# Patient Record
Sex: FEMALE | Race: BLACK OR AFRICAN AMERICAN
[De-identification: names, ages, dates, MRNs, and addresses within clinical notes are randomized per-mention and may not be internally consistent; named-entity substitution may affect disease eponyms.]

---

## 2019-11-29 ENCOUNTER — HOSPITAL ENCOUNTER (INPATIENT)
Dept: HOSPITAL 92 - ERS | Age: 54
LOS: 3 days | Discharge: HOME | DRG: 291 | End: 2019-12-02
Attending: INTERNAL MEDICINE | Admitting: INTERNAL MEDICINE
Payer: MEDICARE

## 2019-11-29 ENCOUNTER — HOSPITAL ENCOUNTER (EMERGENCY)
Dept: HOSPITAL 9 - MADERS | Age: 54
Discharge: ANOTHER HOSPITAL | End: 2019-11-29
Payer: MEDICARE

## 2019-11-29 VITALS — BODY MASS INDEX: 52 KG/M2

## 2019-11-29 DIAGNOSIS — N17.0: ICD-10-CM

## 2019-11-29 DIAGNOSIS — Z85.72: ICD-10-CM

## 2019-11-29 DIAGNOSIS — E66.01: ICD-10-CM

## 2019-11-29 DIAGNOSIS — D63.1: ICD-10-CM

## 2019-11-29 DIAGNOSIS — C85.90: ICD-10-CM

## 2019-11-29 DIAGNOSIS — I50.9: ICD-10-CM

## 2019-11-29 DIAGNOSIS — Z79.4: ICD-10-CM

## 2019-11-29 DIAGNOSIS — J44.1: Primary | ICD-10-CM

## 2019-11-29 DIAGNOSIS — N25.81: ICD-10-CM

## 2019-11-29 DIAGNOSIS — J45.20: ICD-10-CM

## 2019-11-29 DIAGNOSIS — Z88.0: ICD-10-CM

## 2019-11-29 DIAGNOSIS — I50.33: ICD-10-CM

## 2019-11-29 DIAGNOSIS — E78.5: ICD-10-CM

## 2019-11-29 DIAGNOSIS — Z88.8: ICD-10-CM

## 2019-11-29 DIAGNOSIS — I13.0: Primary | ICD-10-CM

## 2019-11-29 DIAGNOSIS — I13.0: ICD-10-CM

## 2019-11-29 DIAGNOSIS — N18.9: ICD-10-CM

## 2019-11-29 DIAGNOSIS — J44.9: ICD-10-CM

## 2019-11-29 DIAGNOSIS — E87.5: ICD-10-CM

## 2019-11-29 DIAGNOSIS — N18.3: ICD-10-CM

## 2019-11-29 DIAGNOSIS — E83.52: ICD-10-CM

## 2019-11-29 DIAGNOSIS — G47.33: ICD-10-CM

## 2019-11-29 DIAGNOSIS — Z79.899: ICD-10-CM

## 2019-11-29 DIAGNOSIS — E11.22: ICD-10-CM

## 2019-11-29 LAB
ALBUMIN SERPL BCG-MCNC: 3.9 G/DL (ref 3.5–5)
ALP SERPL-CCNC: 156 U/L (ref 40–110)
ALT SERPL W P-5'-P-CCNC: 13 U/L (ref 8–55)
ANION GAP SERPL CALC-SCNC: 15 MMOL/L (ref 10–20)
ANISOCYTOSIS BLD QL SMEAR: (no result) (100X)
AST SERPL-CCNC: 8 U/L (ref 5–34)
BASOPHILS # BLD AUTO: 0.1 THOU/UL (ref 0–0.2)
BASOPHILS NFR BLD AUTO: 1.1 % (ref 0–1)
BILIRUB SERPL-MCNC: 0.5 MG/DL (ref 0.2–1.2)
BUN SERPL-MCNC: 37 MG/DL (ref 9.8–20.1)
CALCIUM SERPL-MCNC: 8.9 MG/DL (ref 7.8–10.44)
CHLORIDE SERPL-SCNC: 106 MMOL/L (ref 98–107)
CK SERPL-CCNC: 99 U/L (ref 29–168)
CO2 SERPL-SCNC: 24 MMOL/L (ref 22–29)
CREAT CL PREDICTED SERPL C-G-VRATE: 0 ML/MIN (ref 70–130)
EOSINOPHIL # BLD AUTO: 0.1 THOU/UL (ref 0–0.7)
EOSINOPHIL NFR BLD AUTO: 1 % (ref 0–10)
GLOBULIN SER CALC-MCNC: 3 G/DL (ref 2.4–3.5)
GLUCOSE SERPL-MCNC: 181 MG/DL (ref 70–105)
HGB BLD-MCNC: 8.7 G/DL (ref 12–16)
LYMPHOCYTES # BLD AUTO: 1.8 THOU/UL (ref 1.2–3.4)
LYMPHOCYTES NFR BLD AUTO: 15.4 % (ref 21–51)
MCH RBC QN AUTO: 22.5 PG (ref 27–31)
MCV RBC AUTO: 80.9 FL (ref 78–98)
MDIFF COMPLETE?: YES
MONOCYTES # BLD AUTO: 0.5 THOU/UL (ref 0.11–0.59)
MONOCYTES NFR BLD AUTO: 4.2 % (ref 0–10)
NEUTROPHILS # BLD AUTO: 9.1 THOU/UL (ref 1.4–6.5)
NEUTROPHILS NFR BLD AUTO: 78.4 % (ref 42–75)
PLATELET # BLD AUTO: 367 THOU/UL (ref 130–400)
POTASSIUM SERPL-SCNC: 5.2 MMOL/L (ref 3.5–5.1)
RBC # BLD AUTO: 3.87 MILL/UL (ref 4.2–5.4)
SODIUM SERPL-SCNC: 140 MMOL/L (ref 136–145)
TROPONIN I SERPL DL<=0.01 NG/ML-MCNC: (no result) NG/ML (ref ?–0.03)
TROPONIN I SERPL DL<=0.01 NG/ML-MCNC: 0.02 NG/ML (ref ?–0.03)
WBC # BLD AUTO: 11.7 THOU/UL (ref 4.8–10.8)

## 2019-11-29 PROCEDURE — 36415 COLL VENOUS BLD VENIPUNCTURE: CPT

## 2019-11-29 PROCEDURE — A9558 XE133 XENON 10MCI: HCPCS

## 2019-11-29 PROCEDURE — 85379 FIBRIN DEGRADATION QUANT: CPT

## 2019-11-29 PROCEDURE — 96375 TX/PRO/DX INJ NEW DRUG ADDON: CPT

## 2019-11-29 PROCEDURE — 82550 ASSAY OF CK (CPK): CPT

## 2019-11-29 PROCEDURE — 82274 ASSAY TEST FOR BLOOD FECAL: CPT

## 2019-11-29 PROCEDURE — 94640 AIRWAY INHALATION TREATMENT: CPT

## 2019-11-29 PROCEDURE — 80053 COMPREHEN METABOLIC PANEL: CPT

## 2019-11-29 PROCEDURE — 96365 THER/PROPH/DIAG IV INF INIT: CPT

## 2019-11-29 PROCEDURE — 87804 INFLUENZA ASSAY W/OPTIC: CPT

## 2019-11-29 PROCEDURE — 71045 X-RAY EXAM CHEST 1 VIEW: CPT

## 2019-11-29 PROCEDURE — 85025 COMPLETE CBC W/AUTO DIFF WBC: CPT

## 2019-11-29 PROCEDURE — 78582 LUNG VENTILAT&PERFUS IMAGING: CPT

## 2019-11-29 PROCEDURE — 96374 THER/PROPH/DIAG INJ IV PUSH: CPT

## 2019-11-29 PROCEDURE — 84484 ASSAY OF TROPONIN QUANT: CPT

## 2019-11-29 PROCEDURE — 82570 ASSAY OF URINE CREATININE: CPT

## 2019-11-29 PROCEDURE — 82805 BLOOD GASES W/O2 SATURATION: CPT

## 2019-11-29 PROCEDURE — 76770 US EXAM ABDO BACK WALL COMP: CPT

## 2019-11-29 PROCEDURE — 93306 TTE W/DOPPLER COMPLETE: CPT

## 2019-11-29 PROCEDURE — 84156 ASSAY OF PROTEIN URINE: CPT

## 2019-11-29 PROCEDURE — 83880 ASSAY OF NATRIURETIC PEPTIDE: CPT

## 2019-11-29 PROCEDURE — 36416 COLLJ CAPILLARY BLOOD SPEC: CPT

## 2019-11-29 PROCEDURE — 81003 URINALYSIS AUTO W/O SCOPE: CPT

## 2019-11-29 PROCEDURE — 83970 ASSAY OF PARATHORMONE: CPT

## 2019-11-29 PROCEDURE — 78472 GATED HEART PLANAR SINGLE: CPT

## 2019-11-29 PROCEDURE — A9604 SM 153 LEXIDRONAM: HCPCS

## 2019-11-29 PROCEDURE — 93005 ELECTROCARDIOGRAM TRACING: CPT

## 2019-11-29 PROCEDURE — 80048 BASIC METABOLIC PNL TOTAL CA: CPT

## 2019-11-29 PROCEDURE — A9540 TC99M MAA: HCPCS

## 2019-11-29 NOTE — RAD
Chest AP view



INDICATION: Cough



COMPARISON: None



FINDINGS:



Lungs:The lungs are clear



Cardiac silhouette: Mild cardiomegaly.



Pulmonary vasculature:Mild pulmonary vascular congestion



Pleural spaces:No pleural effusion or pneumothorax is demonstrated.



Upper abdomen:No abnormality seen.



Osseous structures: No acute osseous abnormality.



Additional findings:Left subclavian chest wall port with the tip of the catheter projecting the regio
n of the SVC.



IMPRESSION: Mild cardiomegaly and pulmonary vascular congestion.



Reported By: Felipe Quintanilla 

Electronically Signed:  11/29/2019 2:58 PM

## 2019-11-30 LAB
ALBUMIN SERPL BCG-MCNC: 3.7 G/DL (ref 3.5–5)
ALP SERPL-CCNC: 145 U/L (ref 40–110)
ALT SERPL W P-5'-P-CCNC: 12 U/L (ref 8–55)
ANALYZER IN CARDIO: (no result)
ANALYZER IN CARDIO: (no result)
ANION GAP SERPL CALC-SCNC: 13 MMOL/L (ref 10–20)
ANION GAP SERPL CALC-SCNC: 14 MMOL/L (ref 10–20)
AST SERPL-CCNC: 7 U/L (ref 5–34)
BASE EXCESS STD BLDA CALC-SCNC: -0.6 MEQ/L
BASE EXCESS STD BLDA CALC-SCNC: -2.4 MEQ/L
BASOPHILS # BLD AUTO: 0 THOU/UL (ref 0–0.2)
BASOPHILS NFR BLD AUTO: 0.1 % (ref 0–1)
BILIRUB SERPL-MCNC: 0.5 MG/DL (ref 0.2–1.2)
BUN SERPL-MCNC: 41 MG/DL (ref 9.8–20.1)
BUN SERPL-MCNC: 42 MG/DL (ref 9.8–20.1)
CA-I BLDA-SCNC: 1.21 MMOL/L (ref 1.12–1.3)
CA-I BLDA-SCNC: 1.21 MMOL/L (ref 1.12–1.3)
CALCIUM SERPL-MCNC: 9 MG/DL (ref 7.8–10.44)
CALCIUM SERPL-MCNC: 9.3 MG/DL (ref 7.8–10.44)
CHLORIDE SERPL-SCNC: 105 MMOL/L (ref 98–107)
CHLORIDE SERPL-SCNC: 105 MMOL/L (ref 98–107)
CO2 SERPL-SCNC: 23 MMOL/L (ref 22–29)
CO2 SERPL-SCNC: 24 MMOL/L (ref 22–29)
CREAT CL PREDICTED SERPL C-G-VRATE: 86 ML/MIN (ref 70–130)
CREAT CL PREDICTED SERPL C-G-VRATE: 89 ML/MIN (ref 70–130)
EOSINOPHIL # BLD AUTO: 0 THOU/UL (ref 0–0.7)
EOSINOPHIL NFR BLD AUTO: 0.1 % (ref 0–10)
GLOBULIN SER CALC-MCNC: 3 G/DL (ref 2.4–3.5)
GLUCOSE SERPL-MCNC: 280 MG/DL (ref 70–105)
GLUCOSE SERPL-MCNC: 94 MG/DL (ref 70–105)
HCO3 BLDA-SCNC: 23.8 MEQ/L (ref 22–28)
HCO3 BLDA-SCNC: 26.1 MEQ/L (ref 22–28)
HCT VFR BLDA CALC: 27 % (ref 36–47)
HCT VFR BLDA CALC: 29 % (ref 36–47)
HGB BLD-MCNC: 8.8 G/DL (ref 12–16)
HGB BLDA-MCNC: 10 G/DL (ref 12–16)
HGB BLDA-MCNC: 9.3 G/DL (ref 12–16)
LYMPHOCYTES # BLD: 0.9 THOU/UL (ref 1.2–3.4)
LYMPHOCYTES NFR BLD AUTO: 6.7 % (ref 21–51)
MCH RBC QN AUTO: 23.8 PG (ref 27–31)
MCV RBC AUTO: 78.8 FL (ref 78–98)
MONOCYTES # BLD AUTO: 0.2 THOU/UL (ref 0.11–0.59)
MONOCYTES NFR BLD AUTO: 1.5 % (ref 0–10)
NEUTROPHILS # BLD AUTO: 12.1 THOU/UL (ref 1.4–6.5)
NEUTROPHILS NFR BLD AUTO: 91.6 % (ref 42–75)
O2 A-A PPRESDIFF RESPIRATORY: 13.9 MM[HG] (ref 0–20)
O2 A-A PPRESDIFF RESPIRATORY: 30.25 MM[HG] (ref 0–20)
PCO2 BLDA: 47.7 MMHG (ref 35–45)
PCO2 BLDA: 53.1 MMHG (ref 35–45)
PH BLDA: 7.31 [PH] (ref 7.35–7.45)
PH BLDA: 7.32 [PH] (ref 7.35–7.45)
PLATELET # BLD AUTO: 343 THOU/UL (ref 130–400)
PO2 BLDA: 53.1 MMHG (ref 80–100)
PO2 BLDA: 76.2 MMHG (ref 80–100)
POTASSIUM BLD-SCNC: 4.83 MMOL/L (ref 3.7–5.3)
POTASSIUM BLD-SCNC: 5.02 MMOL/L (ref 3.7–5.3)
POTASSIUM SERPL-SCNC: 5.3 MMOL/L (ref 3.5–5.1)
POTASSIUM SERPL-SCNC: 5.6 MMOL/L (ref 3.5–5.1)
PROT UR STRIP.AUTO-MCNC: 20 MG/DL
PROT UR-MCNC: 33 MG/DL (ref 1–14)
RBC # BLD AUTO: 3.68 MILL/UL (ref 4.2–5.4)
SODIUM SERPL-SCNC: 135 MMOL/L (ref 136–145)
SODIUM SERPL-SCNC: 138 MMOL/L (ref 136–145)
SPECIMEN DRAWN FROM PATIENT: (no result)
SPECIMEN DRAWN FROM PATIENT: (no result)
TROPONIN I SERPL DL<=0.01 NG/ML-MCNC: (no result) NG/ML (ref ?–0.03)
WBC # BLD AUTO: 13.2 THOU/UL (ref 4.8–10.8)

## 2019-11-30 RX ADMIN — IPRATROPIUM BROMIDE SCH ML: 0.5 SOLUTION RESPIRATORY (INHALATION) at 22:53

## 2019-11-30 RX ADMIN — INSULIN GLARGINE SCH: 100 INJECTION, SOLUTION SUBCUTANEOUS at 21:38

## 2019-11-30 RX ADMIN — INSULIN LISPRO SCH: 100 INJECTION, SOLUTION INTRAVENOUS; SUBCUTANEOUS at 16:34

## 2019-11-30 RX ADMIN — INSULIN LISPRO SCH UNITS: 100 INJECTION, SOLUTION INTRAVENOUS; SUBCUTANEOUS at 08:30

## 2019-11-30 RX ADMIN — INSULIN GLARGINE SCH MLS: 100 INJECTION, SOLUTION SUBCUTANEOUS at 08:30

## 2019-11-30 RX ADMIN — IPRATROPIUM BROMIDE SCH ML: 0.5 SOLUTION RESPIRATORY (INHALATION) at 18:32

## 2019-11-30 RX ADMIN — IPRATROPIUM BROMIDE SCH: 0.5 SOLUTION RESPIRATORY (INHALATION) at 10:35

## 2019-11-30 RX ADMIN — INSULIN LISPRO SCH: 100 INJECTION, SOLUTION INTRAVENOUS; SUBCUTANEOUS at 11:30

## 2019-11-30 RX ADMIN — IPRATROPIUM BROMIDE SCH ML: 0.5 SOLUTION RESPIRATORY (INHALATION) at 14:15

## 2019-11-30 NOTE — CON
DATE OF CONSULTATION:  11/30/2019



CONSULTING PHYSICIAN:  [employer].



REASON FOR CONSULTATION:  Shortness of breath.



HISTORY OF PRESENT ILLNESS:  The patient is a 54-year-old female, who comes into the

hospital with increasing shortness of breath over the last one week.  She tells me

that she has a history of congestive heart failure and she has been accumulating

fluid for the last several days.  She says she has been told she has asthma in the

past and has used inhalers at home.  She has been given some diuretics in this

hospitalization and started to breathe better. 



PAST MEDICAL HISTORY:  

1. Diastolic congestive heart failure.

2. Anemia.

3. Hypertension.

4. Diabetes mellitus.

5. Non-Hodgkin lymphoma.



PAST SURGICAL HISTORY:  Unremarkable.



SOCIAL HISTORY:  Nonsmoker.  Does not consume alcohol.  Lives at home with her

family. 



ALLERGIES:  NONE.



MEDICATIONS:  Prior to admission;

1. Albuterol.

2. Norvasc.

3. Lipitor.

4. Vitamin D2.

5. Furosemide.

6. Gabapentin.

7. Glyburide.

8. Levemir insulin.

9. Humalog insulin.

10. Metformin.

11. Metoprolol-XL.

12. Protonix.

13. Sertraline.

14. Triamterene/hydrochlorothiazide.



REVIEW OF SYSTEMS:  Remarkable for swelling and increased shortness of breath.  No

nausea, vomiting, hematemesis, melena, hematochezia, hematuria, or dysuria. 



PHYSICAL EXAMINATION:

VITAL SIGNS:  Temperature 98.3, pulse 73, respirations 18, O2 saturation 98% on 2 L,

and blood pressure 152/74.  Height 5 feet 9 inches, weight 350 pounds, and BMI is

51. 

GENERAL:  She is awake and alert, and in no distress. 

HEENT:  She has a class 4 Mallampati airway. 

NECK:  Without adenopathy or JVD. 

LUNGS:  Diminished breath sounds in the bases. 

CARDIAC:  S1 and S2.  Regular without audible murmur. 

ABDOMEN:  Soft and nontender. 

EXTREMITIES:  She has 2+ edema from her thighs downward.  Her chest x-ray shows

cardiomegaly poorly penetrated film.  A perfusion pulmonary scan was low probability

for pulmonary emboli. 



LABORATORY DATA:  White blood cell count 13, hematocrit 29, and platelet count 343.

A pH of 7.32, pCO2 of 47, pO2 of 76, that was on room air.  Sodium 138, potassium

5.3, chloride 105, CO2 of 24, BUN 42, creatinine 1.8, and glucose 94.  Parathyroid

hormone level is 236 and calcium level is 9.3. 



ASSESSMENT:  

1. Diastolic congestive heart failure with fluid overload.

2. Renal insufficiency.

3. Secondary hyperparathyroidism.

4. Probable underlying obstructive sleep apnea/cor pulmonale/obesity hypoventilation

syndrome. 



PLAN:  Agree with the diuretics, nebulization therapy, and general cardiac workup.

As an outpatient, she will need a sleep study to further workup for VANE. 







Job ID:  [id number]

## 2019-11-30 NOTE — CON
DATE OF CONSULTATION:  



REASON FOR CONSULTATION:  Hyperkalemia.



HISTORY OF PRESENT ILLNESS:  This is a very pleasant 54-year-old female, living in

[place], presented to the hospital with shortness of breath.  The patient had a

creatinine of 2.0, which decreased to 1.89, prior baseline in October was 1.3.  The

patient denies headache, numbness, tingling, or weakness.  Denies nausea, vomiting,

or chest pain.  The patient was admitted for possible COPD exacerbation and rule out

myocardial infarction. 



PAST MEDICAL HISTORY:  Significant for hypertension, non-Hodgkin lymphoma, and

anemia. 



PAST SURGICAL HISTORY:  None.



SOCIOECONOMIC HISTORY:  No alcohol or drug use.



FAMILY HISTORY:  Negative for ESRD.



ALLERGIES:  REVIEWED.



MEDICATIONS:  Home medications list, reviewed.  Hospital medications list, reviewed.



REVIEW OF SYSTEMS:  A 15-point review of systems was performed and was negative

except for positives noted above. 

GENERAL:   

HEAD:   

NECK:  No swelling or lumps. 

NOSE:  No epistaxis or discharge.   

EYES:  No diplopia or pain. 

RESPIRATORY:   

CARDIOVASCULAR:   

GASTROINTESTINAL:   

/GYN:   

MUSCULOSKELETAL:  No joint pain. 

NEUROPSYCHIATIC SYSTEMS:  No suicidal ideation.  No ideation. 

SKIN:  Denies any rash or ulcer. 

CONSTITUTIONAL:   No fever or chills.



PHYSICAL EXAMINATION:

GENERAL:  The patient is awake and alert. 

VITAL SIGNS:  Afebrile, pulse 73, blood pressure 152/74. 

GENERAL APPEARANCE AND MENTAL STATUS:  Fair. 

HEAD/NECK:  Normocephalic.   Atraumatic. 

EYES:  EOMI.  No deformity. 

EARS:  Clear.  No ulcers. 

NOSE:   Intact.  No lesions. 

MOUTH:  Clear.  No discharge. 

THROAT:  Clear.  No exudate. 

LUNGS:   Clear.  No crackles. 

CARDIAC:   S1, S2.  No rub. 

ABDOMEN:   Benign.  Bowel sounds positive. 

GENITALIA/RECTUM:  Silveira absent. 

BACK/EXTREMITIES:  Edema 0+. 

NEUROLOGICAL:  Alert and motor intact. 

SKIN:   

LYMPHATICS:



LABORATORY DATA:  Reviewed.



ASSESSMENT AND PLAN:  

1. Acute kidney injury with chronic kidney disease, most likely due to progressive

diabetic nephropathy versus acute tubular necrosis.  We will follow renal function

closely and await echo. 

2. Hypertension, stable.

3. Anemia, stable.

4. Hyperkalemia.  We will stop heparin and recheck potassium.

5. Proteinuria.  We will order random urine protein to creatinine ratio and follow

labs closely. 







Job ID:  [id number]

## 2019-11-30 NOTE — PDOC.HOSPP
- Subjective


Encounter Date: 11/30/19


Encounter Time: 11:15


Subjective: 


Patient reports SOB with minimal exertion over the past 2 days. Associated with 

orthopnea and PND. Reports 10 lb weight gain over the past 4-5 days. Reports 

that her abdomen has gotten bigger and dresses are tighter to wear. Reports new 

onset leg swelling over the past week. Denies cough, wheezing, fever, chills, 

chest pain. Has palpitations with activity but no lightheadedness. No urinary 

symptoms.





Never tested for VANE. Reports history of asthma as a child and uses albuterol 

PRN. Never smoked. Diagnosed with CHF 4 yrs ago but never had LHC or stress 

test. Denies CAD, MI, CVA. Has DM for about 15 yrs now.





- Objective


Vital Signs & Weight: 


 Vital Signs (12 hours)











  Temp Pulse Resp BP Pulse Ox


 


 11/30/19 11:00  98.3 F  73  18  152/74 H  98


 


 11/30/19 07:13  97.1 F L  70  20  149/75 H  98


 


 11/30/19 06:35      95


 


 11/30/19 06:34   67  16  


 


 11/30/19 04:05  97.1 F L  73  16  160/76 H  97


 


 11/30/19 00:23   81  18   92 L


 


 11/29/19 23:30  98 F  94  20  188/90 H  96








 Weight











Weight                         350 lb














I&O: 


 











 11/29/19 11/30/19 12/01/19





 06:59 06:59 06:59


 


Intake Total  590 


 


Output Total  1050 


 


Balance  -460 











Result Diagrams: 


 11/30/19 05:13





 11/30/19 05:13


Additional Labs: 


 Accuchecks











  11/30/19 11/29/19





  05:16 22:33


 


POC Glucose  279 H  279 H














Hospitalist ROS





- Medication


Medications: 


Active Medications











Generic Name Dose Route Start Last Admin





  Trade Name Freq  PRN Reason Stop Dose Admin


 


Atorvastatin Calcium  40 mg  11/30/19 09:00  11/30/19 08:32





  Lipitor  PO   40 mg





  DAILY SANDEE   Administration





     





     





     





     


 


Cholecalciferol  1,000 units  11/30/19 09:00  11/30/19 08:31





  Vitamin D3  PO   1,000 units





  DAILY SANDEE   Administration





     





     





     





     


 


Gabapentin  300 mg  11/30/19 09:00  11/30/19 08:32





  Neurontin  PO   300 mg





  TID SANDEE   Administration





     





     





     





     


 


Heparin Sodium (Porcine)  5,000 units  11/30/19 09:00  11/30/19 08:30





  Heparin  SC   5,000 units





  TID SANDEE   Administration





     





     





     





     


 


Insulin Glargine 75 units/  0.75 mls @ 0 mls/hr  11/30/19 09:00  11/30/19 08:30





  Miscellaneous Medication  SC   0.75 mls





  BID SANDEE   Administration





     





     





     





     


 


Insulin Human Lispro  20 units  11/30/19 08:00  11/30/19 08:30





  Humalog  SC   20 units





  TID-WM SANDEE   Administration





     





     





     





     


 


Ipratropium Bromide  2.5 ml  11/30/19 10:30  11/30/19 10:35





  Atrovent  NEB   Not Given





  [id number] [facility]   





     





     





     





     


 


Metoprolol Succinate  100 mg  11/30/19 09:00  11/30/19 08:32





  Toprol Xl  PO   100 mg





  BID SANDEE   Administration





     





     





     





     


 


Pantoprazole Sodium  40 mg  11/30/19 09:00  11/30/19 08:32





  Protonix  PO   40 mg





  DAILY SANDEE   Administration





     





     





     





     


 


Sertraline HCl  200 mg  11/30/19 09:00  11/30/19 08:32





  Zoloft  PO   200 mg





  DAILY SANDEE   Administration





     





     





     





     


 


Sodium Polystyrene Sulfonate  15 gm  11/30/19 08:15  11/30/19 08:31





  Kayexalate Oral Susp 15 Gm/60 Ml  PO  11/30/19 12:00  15 gm





  NOW SANDEE   Administration





     





     





     





     














- Exam


General Appearance: ill appearing (in mild distress)


Eye: PERRL, anicteric sclera


ENT: normocephalic atraumatic, no oropharyngeal lesions, moist mucosa


Neck: supple, symmetric, no thyromegaly, no lymphadenopathy


Heart: RRR, no murmur, no gallops, normal peripheral pulses


Heart - other findings: Bilateral 2+ pitting pedal edema present


Respiratory: normal chest expansion, no tachypnea


Respiratory - other findings: Reduced air entry bilateral bases with mild creps


Gastrointestinal: soft, non-tender, normal bowel sounds, no palpable masses


Gastrointestinal - other findings: obese


Extremities: no cyanosis, no clubbing, 2+ LE edema


Skin: normal turgor, no lesions, no rashes


Neurological: cranial nerve grossly intact, normal sensation to touch, no focal 

deficits


Musculoskeletal: normal tone, normal strength, no muscle wasting


Psychiatric: normal affect, normal behavior, A&O x 3





Hosp A/P


(1) CHF (congestive heart failure)


Code(s): I50.9 - HEART FAILURE, UNSPECIFIED   Status: Acute   


Qualifiers: 


   Heart failure type: unspecified   Heart failure chronicity: acute on chronic

   Qualified Code(s): I50.9 - Heart failure, unspecified   


Plan: 


History of CHF 4 yrs ago but patient not aware which type


Never had ischemia evaluation


Patient now with orthopnea, weight gain, PND and pedal edema


Switch to inpatient status. Expected to stay at least 2 midnights


High risk due to need for IV diuretics and GABBY on CKD


Cardio consult


ECHO done this AM


IV lasix and fluid restriction


Malagon cath for strict I/Os








(2) Hyperkalemia


Code(s): E87.5 - HYPERKALEMIA   Status: Acute   


Plan: 


PO kayaxelate given x 1








(3) GABBY (acute kidney injury)


Code(s): N17.9 - ACUTE KIDNEY FAILURE, UNSPECIFIED   Status: Acute   


Plan: 


Baseline creatinine of 1.3


Now with worsened function


Related to CHF exacerbation


Nephrology consulted


Avoid nephrotoxic meds and hypotension


Renal US ordered








(4) DM type 2 (diabetes mellitus, type 2)


Status: Chronic   


Qualifiers: 


   Diabetes mellitus long term insulin use: with long term use   Diabetes 

mellitus complication status: with kidney complications   Diabetes mellitus 

complication detail: with chronic kidney disease   Chronic kidney disease stage

: stage 3 (moderate)   Qualified Code(s): E11.22 - Type 2 diabetes mellitus 

with diabetic chronic kidney disease; N18.3 - Chronic kidney disease, stage 3 (

moderate); Z79.4 - Long term (current) use of insulin   


Plan: 


On home dose of basal insulin


HA1C of 9.5 in 10/19


SSI and diabetic diet


Complicated by CKD-3 and GABBY


Never saw nephrology


Dr. Gordon consulted


PTH elevated








(5) Hyperparathyroidism


Code(s): E21.3 - HYPERPARATHYROIDISM, UNSPECIFIED   Status: Chronic   


Plan: 


Likely related to CKD and Vit. D deficiency


Continue Vit. D supplementation


Nephrology consulted








(6) Vitamin D deficiency


Code(s): E55.9 - VITAMIN D DEFICIENCY, UNSPECIFIED   Status: Chronic   


Plan: 


Replacement








(7) VANE (obstructive sleep apnea)


Code(s): G47.33 - OBSTRUCTIVE SLEEP APNEA (ADULT) (PEDIATRIC)   Status: 

Suspected   


Plan: 


Never had a PSG done


Pulm. consulted and case DW Dr. Barajas


Also suspicion for OHS. ABG ordered to evaluate for hypercapnia








(8) Asthma


Code(s): J45.909 - UNSPECIFIED ASTHMA, UNCOMPLICATED   Status: Chronic   


Qualifiers: 


   Asthma severity: mild   Asthma persistence: intermittent   Asthma 

complication type: uncomplicated   Qualified Code(s): J45.20 - Mild 

intermittent asthma, uncomplicated   


Plan: 


On albuterol PRN


Duonebs PRN for now


Not in exacerbation


Pulm. consulted








(9) HTN (hypertension)


Code(s): I10 - ESSENTIAL (PRIMARY) HYPERTENSION   Status: Chronic   


Qualifiers: 


   Hypertension type: essential hypertension   Qualified Code(s): I10 - 

Essential (primary) hypertension   


Plan: 


Hold anti-HTN due to being on IV diuretics and her GABBY








(10) Morbid obesity with BMI of 50.0-59.9, adult


Code(s): E66.01 - MORBID (SEVERE) OBESITY DUE TO EXCESS CALORIES; Z68.43 - BODY 

MASS INDEX (BMI) 50.0-59.9, ADULT   Status: Chronic   





- Plan


plan discussed w/ family, malagon catheter, DVT proph w/heparin

## 2019-11-30 NOTE — HP
CHIEF COMPLAINT:  Shortness of breath.



PRIMARY CARE PHYSICIAN:  Herminia Lowry MD



HISTORY OF PRESENT ILLNESS:  Ms. Hart is a very pleasant 54-year-old female who

reported to the emergency room in [place] today with 2 days to 3 days of

progressive cough and wheezing with worsening intensity and developing some

respiratory distress this afternoon prior to arrival.  She describes symptoms as

tightness, wheezing.  She reports that she does have a history of COPD, but when

asked she denies a history of CHF or been told that she had a large heart.  She does

have a past medical history pertinent for non-Hodgkin lymphoma, has been in

remission for the last 4 years, anemia, hypertension. 



In the emergency room, labs white blood cell count 11.7, hemoglobin 8.7.  The

patient was found to be wheezing, some moderate respiratory distress.  Chest x-ray

there showed some mild pulmonary vascular congestion, was given 2 DuoNebs,

Solu-Medrol 125, Lasix 40, and was sent over to [facility]

for admission.  The patient also has chronic kidney disease.  In October 2019,

creatinine was 1.34 today, it is 2.06.  The patient was given magnesium 2 g IV

piggyback here and then admitted to observation for further management. 



REVIEW OF SYSTEMS:  The patient reports shortness of breath, dyspnea on exertion,

some orthopnea and wheezing.  Denies any fever or chills.  Denies any abdominal

pain.  Denies nausea, vomiting, diarrhea.  Denies any dizziness.  All systems were

reviewed and are negative unless mentioned above or in HPI. 



PAST MEDICAL HISTORY:  Anemia, hypertension, diabetes, is in remission for

non-Hodgkin's lymphoma. 



PAST SURGICAL HISTORY:  None.



PSYCHIATRIC HISTORY:  None.



SOCIAL HISTORY:  Denies any alcohol or drug use.  Denies any smoking history.  She

lives at home with her family. 



KNOWN ALLERGIES:  None.



CURRENT MEDICATIONS:  

1. Albuterol 2 puffs q.4 hours as needed.

2. Norvasc 10 mg p.o. daily.

3. Lipitor 40 mg p.o. daily.

4. Vitamin D2 53980 units p.o. q.7 days.

5. Furosemide 20 mg p.o. b.i.d.

6. Gabapentin 300 mg p.o. t.i.d.

7. Glyburide 5 mg p.o. b.i.d.

8. Levemir 75 units subcu b.i.d.

9. Humalog 20 units subcu t.i.d.

10. Metformin 1000 mg p.o. daily.

11. Toprol- mg p.o. b.i.d. 

12. Protonix 40 mg p.o. daily.

13. Potassium chloride 10 mEq p.o. daily.  

14. Sertraline 2 tablets p.o. daily.  

15. Triamterene/hydrochlorothiazide 37.5/25 mg p.o. daily.



PHYSICAL EXAMINATION:

VITAL SIGNS:  Blood pressure 175/87, pulse 75, respiratory rate is 24, temp is 98.3,

pO2 sats are 98% on 2 L. 

CONSTITUTIONAL:  The patient appears in mild distress.  She appears ill.  She is

alert and oriented to person, place, and time. 

HEENT:  Head is atraumatic and normocephalic.  Eyes, pupils are equally round and

reactive to light.  Extraocular muscles are intact.  ENT; mouth exam is vikram.

Mucous membranes are moist. 

NECK:  Normal range of motion.  Trachea is midline. 

RESPIRATORY:  Chest breath sounds with occasional rhonchi.  No wheezing.  Chest

expansion is equal.  Breath sounds bilaterally decreased in the bases. 

CARDIOVASCULAR:  __________. 

ABDOMEN:  Nontender.  Bowel sounds are heard.  Exam is limited by body habitus.  No

CVA tenderness. 

BACK:  Normal range of motion.   

EXTREMITIES:  Upper extremity normal range of motion.  Inspection is normal.  Radial

pulses normal.  Lower extremity, inspection has normal range of motion.  Pedal

pulses are normal.  There is +2 edema. 

NEUROLOGIC:  The patient is alert and oriented to person, place, and time.  Speech

is normal. 

SKIN:  Warm, dry.  Normal color. 

PSYCHIATRIC:  Normal affect.  She is alert and oriented to person, place, and time.



LABORATORY DATA:  EKG in the emergency room shows conduction normal, ST segments,

T-waves are normal, axis is normal. 



PLAN/ASSESSMENT:  Dyspnea with a history of chronic obstructive pulmonary disease.

The patient was given 3 DuoNebs.  We will continue these q.6 hours scheduled.  We

will hold off on the steroids for now.  Patient x-ray shows mild cardiomegaly with

some pulmonary congestion.  The patient denies a history of congestive heart

failure.  We will obtain an echocardiogram.  The patient's D-dimer was also

elevated, acute-on-chronic renal failure with a creatinine over 2, get a V/Q scan,

x2 troponins undetectable. 

1. Acute-on-chronic kidney, hold any nephrotoxic drugs.  Recheck in the morning.

2. Hypercalcemia at 5.2.  We will recheck this in the morning.

3. Diabetes type 2.  Accu-Cheks before meals and at bedtime, sliding-scale as

needed.  We will restart her basal insulin. 

4. Anemia.  The patient has had a drop in her hemoglobin.  Check guaiac.  Recheck in

the a.m. 

5. History of hypertension.  We will restart home medications.  We will trend.

6. Hyperlipidemia.  We will restart home medications. 

Hospital course dependent on clinical findings.  Case discussed with Dr. Allen who

agrees with plan. 







Job ID:  [id number] 14-Nov-2017 22:30

## 2019-11-30 NOTE — ULT
RENAL ULTRASOUND:

 

HISTORY: 

Acute kidney insufficiency.

 

FINDINGS: 

Both kidneys measure approximately 11 cm in length.  No evidence of hydronephrosis.  No renal mass le
denise.  Cortical thickness and echogenicity appears preserved.

 

Images of the bladder region show no evidence of distended bladder.  The bladder appears to be contra
cted and is not evaluated.

 

IMPRESSION: 

Unremarkable renal ultrasound.

 

POS: OFF

## 2019-11-30 NOTE — CON
DATE OF CONSULTATION:  11/30/2019



REASON FOR CONSULTATION:  Shortness of breath.



HISTORY OF PRESENT ILLNESS:  Ms. Hart is a pleasant 54-year-old 

female, who comes to the hospital for worsening shortness of breath.  She states for

the last week, she has been noticing accumulation of fluid in her legs and getting a

lot more shortness of breath to the point where she had to come in for evaluation.

She has had this happened in the past.  She has been told at some point she had

asthma.  She has been given some IV diuretics and has already urinated a whole lot

of fluid and is feeling much better already. 



PAST MEDICAL HISTORY:  

1. History of diastolic heart failure in the past.

2. Anemia.

3. Hypertension.

4. Type 2 diabetes.

5. Non-Hodgkin's lymphoma in the past.



PAST SURGICAL HISTORY:  None.



SOCIAL HISTORY:  No alcohol, tobacco, or drugs.



OUTPATIENT MEDICATIONS:  Include;

1. Albuterol.

2. Metformin 1000 mg a day.

3. Insulin lispro 20 units t.i.d.

4. Levemir 75 units b.i.d.

5. Sertraline.

6. Triamterene-hydrochlorothiazide.

7. Potassium chloride 10 mEq a day.

8. Atorvastatin 40 mg a day.

9. Furosemide 20 mg b.i.d.

10. Amlodipine 10 mg a day.

11. Pantoprazole 40 mg a day.

12. Metoprolol succinate 100 mg b.i.d.

13. Vitamin D.

14. Gabapentin.

15. Glyburide 5 mg b.i.d.



ALLERGIES:  PENICILLIN GIVES HER HIVES.



REVIEW OF SYSTEMS:  A 12-point review of system is unremarkable except stated in

history of present illness. 



FAMILY HISTORY:  Noncontributory.



PHYSICAL EXAMINATION:

VITAL SIGNS:  Temperature 98.3, pulse 72, respiratory rate 18, sat 98% on 2 L, blood

pressure 138/65. 

GENERAL:  Awake, alert, and oriented x3.  No distress. 

HEENT:  Normocephalic and atraumatic. 

NECK:  Supple. 

LUNGS:  Clear. 

CARDIOVASCULAR:  S1 and S2.  No S3 or S4.  Distant heart sounds. 

ABDOMEN:  Soft.  Positive bowel sounds. 

EXTREMITIES:  2+ edema. 

SKIN:  Warm and dry.



LABORATORY DATA:  Laboratory work was reviewed.  White count of 13, hemoglobin 8.8,

hematocrit of 29, platelet count of 243.  ABG was reviewed chemistries were

reviewed.  Sodium 138, potassium is 5.3, BUN of 42, creatinine 1.83, this is down

from 1.89 after some diuresis.  PTH was elevated.  Alkaline phosphatase was

elevated.  Troponin I is undetectable.  UA unremarkable. 



EKG was reviewed. 



Chest x-ray was reviewed. 



Echocardiogram showed what appeared to be normal EF, however, this is difficult to

assess.  There is a technically difficult study.  Right ventricular pressures are

elevated estimated about 41 mmHg that is assuming 5 mmHg from the IVC which was not

well seen. 



ASSESSMENT:  

1. Acute on chronic diastolic heart failure.

2. Type 2 diabetes.

3. Obesity.

4. Concern for obesity hypoventilation syndrome and severe sleep apnea.



PLAN:  

1. Agree with continued IV diuresis.  She is already feeling much better.

2. I will recommend a MUGA scan to get a better evaluation of her LV function.  Her

inferior wall and inferolateral wall, I cannot really see on the echo and I cannot

tell if they are hypokinetic or normokinetic. 



Thank you for letting us to participate in the care of your patient.  We will follow.







Job ID:  [id number]

## 2019-11-30 NOTE — NM
VENTILATION PERFUSION SCAN:

 

INDICATION: 

Shortness of breath.  Tachypnea.  Elevated D-dimer.

 

COMPARISON: 

Correlation is made to a portable chest film of 11/29/2019.  That exam showed mild vascular congestio
n.  

 

FINDINGS: 

Ventilation scan performed administering 13 mCi of inhaled Xenon gas.

 

Images show symmetric ventilation.  No ventilation defect or significant air trapping.

 

Perfusion scan performed administering 6.5 mCi of Technetium labelled MAA IV.  Lungs were imaged in 8
 projections.

 

No perfusion defect.  

 

IMPRESSION: 

Low probability of pulmonary embolus.

 

POS: OFF

## 2019-12-01 LAB
ALBUMIN SERPL BCG-MCNC: 3.8 G/DL (ref 3.5–5)
ALP SERPL-CCNC: 139 U/L (ref 40–110)
ALT SERPL W P-5'-P-CCNC: 16 U/L (ref 8–55)
ANION GAP SERPL CALC-SCNC: 12 MMOL/L (ref 10–20)
AST SERPL-CCNC: 9 U/L (ref 5–34)
BASOPHILS # BLD AUTO: 0.1 THOU/UL (ref 0–0.2)
BASOPHILS NFR BLD AUTO: 0.5 % (ref 0–1)
BILIRUB SERPL-MCNC: 0.6 MG/DL (ref 0.2–1.2)
BUN SERPL-MCNC: 45 MG/DL (ref 9.8–20.1)
CALCIUM SERPL-MCNC: 9.5 MG/DL (ref 7.8–10.44)
CHLORIDE SERPL-SCNC: 103 MMOL/L (ref 98–107)
CO2 SERPL-SCNC: 29 MMOL/L (ref 22–29)
CREAT CL PREDICTED SERPL C-G-VRATE: 92 ML/MIN (ref 70–130)
EOSINOPHIL # BLD AUTO: 0.1 THOU/UL (ref 0–0.7)
EOSINOPHIL NFR BLD AUTO: 0.7 % (ref 0–10)
GLOBULIN SER CALC-MCNC: 3 G/DL (ref 2.4–3.5)
GLUCOSE SERPL-MCNC: 64 MG/DL (ref 70–105)
HGB BLD-MCNC: 9.1 G/DL (ref 12–16)
LYMPHOCYTES # BLD: 2.6 THOU/UL (ref 1.2–3.4)
LYMPHOCYTES NFR BLD AUTO: 20.1 % (ref 21–51)
MCH RBC QN AUTO: 23.8 PG (ref 27–31)
MCV RBC AUTO: 78.3 FL (ref 78–98)
MONOCYTES # BLD AUTO: 0.5 THOU/UL (ref 0.11–0.59)
MONOCYTES NFR BLD AUTO: 3.8 % (ref 0–10)
NEUTROPHILS # BLD AUTO: 9.6 THOU/UL (ref 1.4–6.5)
NEUTROPHILS NFR BLD AUTO: 74.9 % (ref 42–75)
PLATELET # BLD AUTO: 404 THOU/UL (ref 130–400)
POTASSIUM SERPL-SCNC: 4.3 MMOL/L (ref 3.5–5.1)
RBC # BLD AUTO: 3.8 MILL/UL (ref 4.2–5.4)
SODIUM SERPL-SCNC: 140 MMOL/L (ref 136–145)
WBC # BLD AUTO: 12.8 THOU/UL (ref 4.8–10.8)

## 2019-12-01 RX ADMIN — INSULIN GLARGINE SCH MLS: 100 INJECTION, SOLUTION SUBCUTANEOUS at 20:54

## 2019-12-01 RX ADMIN — INSULIN LISPRO SCH: 100 INJECTION, SOLUTION INTRAVENOUS; SUBCUTANEOUS at 17:22

## 2019-12-01 RX ADMIN — IPRATROPIUM BROMIDE SCH ML: 0.5 SOLUTION RESPIRATORY (INHALATION) at 22:43

## 2019-12-01 RX ADMIN — INSULIN LISPRO SCH: 100 INJECTION, SOLUTION INTRAVENOUS; SUBCUTANEOUS at 09:04

## 2019-12-01 RX ADMIN — IPRATROPIUM BROMIDE SCH ML: 0.5 SOLUTION RESPIRATORY (INHALATION) at 02:29

## 2019-12-01 RX ADMIN — IPRATROPIUM BROMIDE SCH ML: 0.5 SOLUTION RESPIRATORY (INHALATION) at 18:26

## 2019-12-01 RX ADMIN — INSULIN LISPRO SCH: 100 INJECTION, SOLUTION INTRAVENOUS; SUBCUTANEOUS at 12:09

## 2019-12-01 RX ADMIN — IPRATROPIUM BROMIDE SCH ML: 0.5 SOLUTION RESPIRATORY (INHALATION) at 13:57

## 2019-12-01 RX ADMIN — IPRATROPIUM BROMIDE SCH ML: 0.5 SOLUTION RESPIRATORY (INHALATION) at 06:57

## 2019-12-01 RX ADMIN — INSULIN GLARGINE SCH: 100 INJECTION, SOLUTION SUBCUTANEOUS at 09:07

## 2019-12-01 RX ADMIN — IPRATROPIUM BROMIDE SCH ML: 0.5 SOLUTION RESPIRATORY (INHALATION) at 10:19

## 2019-12-01 NOTE — PDOC.HOSPP
- Subjective


Encounter Date: 12/01/19


Encounter Time: 13:00


Subjective: 





f/u for Acute/Chronic diast CHF on IV Lasix. Overall feels better and less SOB. 

Ambulated in halls today. 





- Objective


Vital Signs & Weight: 


 Vital Signs (12 hours)











  Temp Pulse Resp BP Pulse Ox


 


 12/01/19 12:00  96.6 F L  66  28 H  170/83 H  92 L


 


 12/01/19 10:19   68  16   93 L


 


 12/01/19 07:33  97.5 F L  66  18  133/64  95


 


 12/01/19 06:57   77  18   96


 


 12/01/19 04:00  97.9 F  64  24 H  145/66 H  93 L


 


 12/01/19 02:29   53 L  16   94 L








 Weight











Weight                         352 lb 4.8 oz














I&O: 


 











 11/30/19 12/01/19 12/02/19





 06:59 06:59 06:59


 


Intake Total 590 840 


 


Output Total 1050 1700 


 


Balance -460 -860 











Result Diagrams: 


 12/01/19 03:51





 12/01/19 03:51


Additional Labs: 


 Accuchecks











  12/01/19 12/01/19 11/30/19





  10:24 05:38 20:07


 


POC Glucose  120 H  97  128 H








Microbiology





11/30/19 11:40   Stool   Stool Occult Blood (TAWANDA) - Final





 Laboratory Tests











  11/30/19 11/30/19 12/01/19





  05:13 13:25 03:51


 


WBC  13.2 H  


 


Hgb  8.8 L  


 


Neutrophils %  91.6 H   74.9


 


Potassium   5.3 H 


 


BUN   42 H 


 


Creatinine   1.83 H 











Radiology Reviewed by me: Yes (Echo - Grade I/III diast dsfxn, LV not fully 

assessed)


EKG Reviewed by me: Yes (Tele - SR)





Hospitalist ROS





- Medication


Medications: 


Active Medications











Generic Name Dose Route Start Last Admin





  Trade Name Freq  PRN Reason Stop Dose Admin


 


Acetaminophen  650 mg  11/29/19 21:33  11/30/19 17:04





  Tylenol  PO   650 mg





  Q4H PRN   Administration





  Headache/Fever/Mild Pain (1-3)   





     





     





     


 


Atorvastatin Calcium  40 mg  11/30/19 09:00  12/01/19 09:14





  Lipitor  PO   40 mg





  DAILY SANDEE   Administration





     





     





     





     


 


Cholecalciferol  1,000 units  11/30/19 09:00  12/01/19 09:14





  Vitamin D3  PO   1,000 units





  DAILY SANDEE   Administration





     





     





     





     


 


Furosemide  60 mg  11/30/19 14:00  12/01/19 05:58





  Lasix  SLOW IVP   60 mg





  0600,1400 SANDEE   Administration





     





     





     





     


 


Gabapentin  300 mg  11/30/19 09:00  12/01/19 09:15





  Neurontin  PO   300 mg





  TID SANDEE   Administration





     





     





     





     


 


Insulin Glargine 75 units/  0.75 mls @ 0 mls/hr  11/30/19 09:00  12/01/19 09:07





  Miscellaneous Medication  SC   Not Given





  BID SANDEE   





     





     





     





     


 


Insulin Human Lispro  20 units  11/30/19 08:00  12/01/19 12:09





  Humalog  SC   Not Given





  TID-WM SANDEE   





     





     





     





     


 


Ipratropium Bromide  2.5 ml  11/30/19 10:30  12/01/19 10:19





  Atrovent  NEB   2.5 ml





  [id number] SANDEE   Administration





     





     





     





     


 


Metoprolol Succinate  100 mg  11/30/19 09:00  12/01/19 09:15





  Toprol Xl  PO   100 mg





  BID SANDEE   Administration





     





     





     





     


 


Pantoprazole Sodium  40 mg  11/30/19 09:00  12/01/19 09:15





  Protonix  PO   40 mg





  DAILY SANDEE   Administration





     





     





     





     


 


Sertraline HCl  200 mg  11/30/19 09:00  12/01/19 09:15





  Zoloft  PO   200 mg





  DAILY SANDEE   Administration





     





     





     





     














- Exam


General Appearance: NAD, awake alert


Eye: PERRL, anicteric sclera


ENT: normocephalic atraumatic, no oropharyngeal lesions


Neck: supple, symmetric, no JVD, no thyromegaly, no lymphadenopathy


Heart: RRR, no gallops, no rubs, normal peripheral pulses


Respiratory: CTAB


Respiratory - other findings: diminished in bases bilat, scattered coarse sounds


Gastrointestinal: soft, non-tender, non-distended, normal bowel sounds


Extremities: no cyanosis, no clubbing, 2+ LE edema


Skin: normal turgor, no lesions


Neurological: cranial nerve grossly intact, no new deficit


Musculoskeletal: normal tone, generalized weakness


Psychiatric: normal affect, A&O x 3





Hosp A/P


(1) Acute on chronic diastolic (congestive) heart failure


Code(s): I50.33 - ACUTE ON CHRONIC DIASTOLIC (CONGESTIVE) HEART FAILURE   Status

: Acute   


Plan: 


Continue Lasix 60mg IV BID, serial I/O's, accurate daily weights, MUGA scan 

pending








(2) GABBY (acute kidney injury)


Code(s): N17.9 - ACUTE KIDNEY FAILURE, UNSPECIFIED   Status: Acute   


Plan: 


Improved, avoid nephrotoxic meds and limit contrast exposure, serial creatinine








(3) Hyperkalemia


Code(s): E87.5 - HYPERKALEMIA   Status: Acute   


Plan: 


Resolved, continue serial monitoring








(4) DM type 2 (diabetes mellitus, type 2)


Status: Chronic   


Qualifiers: 


   Diabetes mellitus long term insulin use: with long term use   Diabetes 

mellitus complication status: with kidney complications   Diabetes mellitus 

complication detail: with chronic kidney disease   Chronic kidney disease stage

: stage 3 (moderate)   Qualified Code(s): E11.22 - Type 2 diabetes mellitus 

with diabetic chronic kidney disease; N18.3 - Chronic kidney disease, stage 3 (

moderate); Z79.4 - Long term (current) use of insulin   


Plan: 


Hold Glargine due to relative hypoglycemia, serial accuchecks, ISS








(5) HTN (hypertension)


Code(s): I10 - ESSENTIAL (PRIMARY) HYPERTENSION   Status: Chronic   


Qualifiers: 


   Hypertension type: essential hypertension   Qualified Code(s): I10 - 

Essential (primary) hypertension   


Plan: 


Stable, continue current BP regimen, serial monitoring








(6) Morbid obesity with BMI of 50.0-59.9, adult


Code(s): E66.01 - MORBID (SEVERE) OBESITY DUE TO EXCESS CALORIES; Z68.43 - BODY 

MASS INDEX (BMI) 50.0-59.9, ADULT   Status: Chronic   





(7) VANE (obstructive sleep apnea)


Code(s): G47.33 - OBSTRUCTIVE SLEEP APNEA (ADULT) (PEDIATRIC)   Status: Chronic

   


Plan: 


Outpt sleep study








- Plan


plan discussed w/ family, PT/OT, , out of bed/ambulate





Stable currently


Continue Lasix 60mg IV BID


OOB/ambulate


MUGA scan pending


AM lab: BMP


Likely home in 24h

## 2019-12-01 NOTE — PRG
DATE OF SERVICE:  12/01/2019



SUBJECTIVE:  This is a 54-year-old female, being seen for acute kidney injury.  The

patient denies any nausea, vomiting, or chest pain. 



OBJECTIVE:  GENERAL:  The patient is awake and alert. 

VITAL SIGNS:  Afebrile, pulse 75, breathing at 16, blood pressure was 133/64. 

GENERAL APPEARANCE AND MENTAL STATUS:  Fair. 

HEAD/NECK:  Normocephalic.   Atraumatic. 

EYES:  EOMI.  No deformity. 

EARS:  Clear.  No ulcers. 

NOSE:  Intact.  No lesions. 

MOUTH:  Clear.  No discharge. 

THROAT:  Clear.  No exudate. 

LUNGS:  Clear.  No crackles. 

CARDIAC:  S1, S2.  No rub. 

ABDOMEN:  Benign.  Bowel sounds positive. 

GENITALIA/RECTUM:  Silveira absent. 

BACK/EXTREMITIES:  Edema 0+. 

NEUROLOGICAL:  Alert and motor intact. 

SKIN:   

LYMPHATICS:



LABORATORY DATA:  Reviewed.



ASSESSMENT AND PLAN:  

1. Acute kidney injury with chronic kidney disease, stage 3, stable.

2. Acute tubular necrosis, improved.

3. Hypertension, stable.

4. Anemia, stable. 



Medication based on GFR, appropriate.  No indication for dialysis.  Renal ultrasound

shows no hydronephrosis or renal mass.  Proteinuria 1.5 g.  We will consider ACE

inhibitor once renal function improves. 





Job ID:  [id number]

## 2019-12-01 NOTE — PRG
DATE OF SERVICE:  12/01/2019



SUBJECTIVE:  Ms. Hart is sitting up in bed.  She feels better today.  She is not

having difficulty breathing at this time. 



OBJECTIVE:  VITAL SIGNS:  Her temperature is 96.6, pulse 66, respirations 20, O2

saturation 92% on room air, blood pressure 170/83. 

HEENT:  Unchanged. 

NECK:  No adenopathy or JVD. 

CHEST:  Clear. 

CARDIAC:  S1 and S2.  Regular. 

ABDOMEN:  Soft. 

EXTREMITIES:  No edema.



LABORATORY DATA:  White blood cell count 12.8, hematocrit 29.8, and platelet count

404.  Sodium 140, potassium 4.3, BUN 45, creatinine 1.7, and glucose 64. 



ASSESSMENT:  

1. Diastolic heart failure.

2. Renal insufficiency.

3. Likely underlying VANE/obesity hypoventilation syndrome.



PLAN:  

1. Continue diuresis.

2. Outpatient sleep study.

3. Not much more at this time.







Job ID:  [id number]

## 2019-12-02 VITALS — DIASTOLIC BLOOD PRESSURE: 65 MMHG | SYSTOLIC BLOOD PRESSURE: 144 MMHG

## 2019-12-02 VITALS — TEMPERATURE: 98.3 F

## 2019-12-02 LAB
ANION GAP SERPL CALC-SCNC: 11 MMOL/L (ref 10–20)
BUN SERPL-MCNC: 41 MG/DL (ref 9.8–20.1)
CALCIUM SERPL-MCNC: 8.9 MG/DL (ref 7.8–10.44)
CHLORIDE SERPL-SCNC: 98 MMOL/L (ref 98–107)
CO2 SERPL-SCNC: 33 MMOL/L (ref 22–29)
CREAT CL PREDICTED SERPL C-G-VRATE: 95 ML/MIN (ref 70–130)
GLUCOSE SERPL-MCNC: 180 MG/DL (ref 70–105)
POTASSIUM SERPL-SCNC: 4 MMOL/L (ref 3.5–5.1)
SODIUM SERPL-SCNC: 138 MMOL/L (ref 136–145)

## 2019-12-02 RX ADMIN — INSULIN LISPRO SCH: 100 INJECTION, SOLUTION INTRAVENOUS; SUBCUTANEOUS at 13:17

## 2019-12-02 RX ADMIN — INSULIN GLARGINE SCH MLS: 100 INJECTION, SOLUTION SUBCUTANEOUS at 09:38

## 2019-12-02 RX ADMIN — INSULIN LISPRO SCH: 100 INJECTION, SOLUTION INTRAVENOUS; SUBCUTANEOUS at 16:56

## 2019-12-02 RX ADMIN — IPRATROPIUM BROMIDE SCH: 0.5 SOLUTION RESPIRATORY (INHALATION) at 07:09

## 2019-12-02 RX ADMIN — IPRATROPIUM BROMIDE SCH: 0.5 SOLUTION RESPIRATORY (INHALATION) at 16:16

## 2019-12-02 RX ADMIN — IPRATROPIUM BROMIDE SCH: 0.5 SOLUTION RESPIRATORY (INHALATION) at 10:58

## 2019-12-02 RX ADMIN — INSULIN LISPRO SCH: 100 INJECTION, SOLUTION INTRAVENOUS; SUBCUTANEOUS at 07:59

## 2019-12-02 RX ADMIN — IPRATROPIUM BROMIDE SCH ML: 0.5 SOLUTION RESPIRATORY (INHALATION) at 02:26

## 2019-12-02 NOTE — PRG
DATE OF SERVICE:  12/02/2019



SUBJECTIVE:  A 54-year-old female, being seen for acute kidney injury.  The patient

denied any nausea, vomiting, or chest pain. 



OBJECTIVE:  CONSTITUTIONAL:  The patient is awake and alert. 

VITAL SIGNS:  Afebrile, pulse 68, breathing 16, and blood pressure 137/66. 

GENERAL APPEARANCE AND MENTAL STATUS:  Fair. 

HEAD/NECK:  Normocephalic.  Atraumatic. 

EYES:  EOMI.  No deformity. 

EARS:  Clear.  No ulcers. 

NOSE:  Intact.  No lesions. 

MOUTH:  Clear.  No discharge. 

THROAT:  Clear.  No exudate. 

LUNGS:  Clear.  No crackles. 

CARDIAC:  S1, S2.  No rub. 

ABDOMEN:  Benign.  Bowel sounds positive. 

GENITALIA/RECTUM:  Silveira absent. 

BACK/EXTREMITIES:  Edema 0+.    

NEUROLOGICAL:  Alert and motor intact.                      

SKIN:   

LYMPHATICS:



LABORATORY DATA:  Labs show hemoglobin 9.1.  Creatinine 1.7.



ASSESSMENT AND PLAN:  

1. Acute kidney injury with chronic kidney disease stage 3, stable.

2. Hypertension, stable.

3. Anemia, stable.

4. Acute tubular necrosis, stable.  No indication for dialysis.







Job ID:  [id number]

## 2019-12-02 NOTE — PRG
DATE OF SERVICE:  12/02/2019



SUBJECTIVE:  The patient is feeling better.  She has no acute complaints regarding

her breathing. 



OBJECTIVE:  VITAL SIGNS:  Temperature 97.6, pulse 60, respirations 18, O2 saturation

93% on room air, blood pressure 137/66. 

HEENT:  Unremarkable. 

NECK:  No adenopathy or JVD. 

LUNGS:  Clear anteriorly. 

CARDIAC:  S1, S2.  Regular. 

ABDOMEN:  Soft. 

EXTREMITIES:  No edema.



ASSESSMENT:  

1. Diastolic heart dysfunction with pulmonary edema.

2. Likely underlying obstructive sleep apnea.



PLAN:  She needs an outpatient sleep study down the road.  She can follow up with me

in the office for that.  No further pulmonary intervention plan at this time.  We

will sign off.  Please recall further assistance as needed. 







Job ID:  [id number]

## 2019-12-03 NOTE — DIS
DATE OF ADMISSION:  11/30/2019



DATE OF DISCHARGE:  12/02/2019



DISCHARGE DIAGNOSES:  

1. Acute-on-chronic diastolic congestive heart failure with ejection fraction of 70%.

2. Acute kidney injury on chronic kidney disease stage 3.

3. Hyperkalemia, resolved.

4. Diabetes mellitus type 2 with nephropathy.

5. Hypertension, labile.

6. Morbid obesity.

7. Obstructive sleep apnea, suspected.



CONSULTATIONS:  

1. Dr. Barajas with Pulmonology Service.

2. Dr. Gordon with Nephrology Service.

3. Dr. Luna with Cardiology Service.



PERTINENT LABORATORY AND X-RAY FINDINGS:  Potassium ranging between 4.0 to 5.6.

Creatinine ranging between 1.73-1.89.  Estimated GFR ranging between 34-37.

Troponin I negative x3.  CBC showed a white blood cell count ranging between 12.8 to

13.2, hemoglobin ranging between 8.8 to 9.1, MCV 78.  Stool Hemoccult x1,

11/30/2019.  Portable chest x-ray dated 11/29/2019, showed mild cardiomegaly with

pulmonary vascular congestion.  Ventilation perfusion scan dated 11/30/2019, showed

low probability for pulmonary embolus.  Bilateral renal ultrasound dated 11/30/2019,

showed negative findings.  2D transthoracic echocardiogram dated 11/30/2019, showed

technically limited exam due to body habitus.  Grade 1/3 diastolic dysfunction

noted.  Right ventricular systolic pressure 41 mmHg.  MUGA scan dated 12/02/2019,

showed ejection fraction of 73%. 



HOSPITAL COURSE:  The patient was initially admitted to the telemetry unit after

presenting with increased shortness of breath and lower extremity edema.  Chest

imaging confirmed pulmonary vascular congestion and the patient received IV Lasix.

The patient was also initially treated with bronchodilator therapy with DuoNebs and

IV Solu-Medrol.  The patient continued to diurese with IV Lasix throughout the

hospital course with approximate 10-pound weight loss during the hospital stay. 

Cardiac evaluation included 2D transthoracic echocardiogram and MUGA scan showing

preserved ejection fraction of 73%.  The patient continued IV Lasix with symptomatic

improvement and decrease dyspnea with exertion.  The patient was also noted with

mild GABBY on chronic kidney disease, avoiding nephrotoxic agents and serial

creatinine monitoring.  Renal function stabilized with supportive management with

recommendations for ongoing outpatient surveillance.  Overall, the patient did

remain clinically stable during the hospital course.  The patient was noted with

symptoms and signs consistent with obstructive sleep apnea with pulmonology

recommending outpatient sleep study after discharge.  I have examined the patient at

the time of discharge and discussed followup instructions.  The patient verbalized

understanding and agreement, ready for discharge on 12/02/2019. 



DISCHARGE MEDICATIONS:  

1. Albuterol sulfate 2 puffs inhaled q.4 hours p.r.n.

2. Norvasc 10 mg p.o. daily.

3. Lipitor 40 mg p.o. daily.

4. Vitamin D2 65775 units p.o. q.7 days.

5. Lasix 20 mg p.o. b.i.d.

6. Gabapentin 300 mg p.o. t.i.d.

7. Glyburide 5 mg p.o. b.i.d.

8. Levemir 75 units subcutaneously b.i.d.

9. Humalog 20 units subcutaneously t.i.d. with meals.

10. Metformin 1000 mg p.o. daily.

11. Toprol- mg p.o. b.i.d.

12. Protonix 40 mg p.o. daily.

13. Potassium chloride 10 mEq p.o. daily.

14. Sertraline 200 mg p.o. daily.



FOLLOWUP:  The patient may follow up with her primary care provider, Dr. Benavides

within 7 days of discharge.  The patient will follow up with Dr. Luna 2 to 3 weeks

after discharge.  The patient will follow up with Dr. Obinna Barajas of pulmonology

Service. 



CONDITION ON DISCHARGE:  Stable.



ACTIVITY:  Ad-daren.



DIET:  Heart healthy and ADA.



CODE STATUS:  Full.



DISPOSITION:  To home 12/02/2019.



TIME SPENT WITH PATIENT:  Total time preparing and coordinating discharge is 31

minutes. 







Job ID:  [id number]

## 2019-12-16 ENCOUNTER — HOSPITAL ENCOUNTER (EMERGENCY)
Dept: HOSPITAL 9 - MADERS | Age: 54
Discharge: ANOTHER HOSPITAL | End: 2019-12-16
Payer: MEDICARE

## 2019-12-16 ENCOUNTER — HOSPITAL ENCOUNTER (INPATIENT)
Dept: HOSPITAL 92 - ERS | Age: 54
LOS: 3 days | Discharge: HOME | DRG: 291 | End: 2019-12-19
Attending: INTERNAL MEDICINE | Admitting: INTERNAL MEDICINE
Payer: MEDICARE

## 2019-12-16 VITALS — BODY MASS INDEX: 50.5 KG/M2

## 2019-12-16 DIAGNOSIS — Z85.72: ICD-10-CM

## 2019-12-16 DIAGNOSIS — E83.42: ICD-10-CM

## 2019-12-16 DIAGNOSIS — Z79.51: ICD-10-CM

## 2019-12-16 DIAGNOSIS — C85.90: ICD-10-CM

## 2019-12-16 DIAGNOSIS — I50.33: ICD-10-CM

## 2019-12-16 DIAGNOSIS — I13.0: Primary | ICD-10-CM

## 2019-12-16 DIAGNOSIS — J96.01: ICD-10-CM

## 2019-12-16 DIAGNOSIS — J44.9: ICD-10-CM

## 2019-12-16 DIAGNOSIS — D64.9: ICD-10-CM

## 2019-12-16 DIAGNOSIS — J44.1: Primary | ICD-10-CM

## 2019-12-16 DIAGNOSIS — I50.9: ICD-10-CM

## 2019-12-16 DIAGNOSIS — Z85.89: ICD-10-CM

## 2019-12-16 DIAGNOSIS — E66.01: ICD-10-CM

## 2019-12-16 DIAGNOSIS — R40.2142: ICD-10-CM

## 2019-12-16 DIAGNOSIS — R40.2252: ICD-10-CM

## 2019-12-16 DIAGNOSIS — G47.33: ICD-10-CM

## 2019-12-16 DIAGNOSIS — D50.9: ICD-10-CM

## 2019-12-16 DIAGNOSIS — Z79.4: ICD-10-CM

## 2019-12-16 DIAGNOSIS — E11.9: ICD-10-CM

## 2019-12-16 DIAGNOSIS — R40.2362: ICD-10-CM

## 2019-12-16 DIAGNOSIS — Z79.899: ICD-10-CM

## 2019-12-16 DIAGNOSIS — R80.9: ICD-10-CM

## 2019-12-16 DIAGNOSIS — N17.9: ICD-10-CM

## 2019-12-16 DIAGNOSIS — N18.3: ICD-10-CM

## 2019-12-16 DIAGNOSIS — I11.0: ICD-10-CM

## 2019-12-16 DIAGNOSIS — E11.22: ICD-10-CM

## 2019-12-16 DIAGNOSIS — Z88.0: ICD-10-CM

## 2019-12-16 LAB
ALBUMIN SERPL BCG-MCNC: 3.7 G/DL (ref 3.5–5)
ALP SERPL-CCNC: 140 U/L (ref 40–110)
ALT SERPL W P-5'-P-CCNC: 9 U/L (ref 8–55)
ANION GAP SERPL CALC-SCNC: 12 MMOL/L (ref 10–20)
ANION GAP SERPL CALC-SCNC: 16 MMOL/L (ref 10–20)
ANISOCYTOSIS BLD QL SMEAR: (no result) (100X)
AST SERPL-CCNC: 12 U/L (ref 5–34)
BASOPHILS # BLD AUTO: 0.1 THOU/UL (ref 0–0.2)
BASOPHILS NFR BLD AUTO: 0.8 % (ref 0–1)
BILIRUB SERPL-MCNC: 0.6 MG/DL (ref 0.2–1.2)
BUN SERPL-MCNC: 17 MG/DL (ref 9.8–20.1)
BUN SERPL-MCNC: 19 MG/DL (ref 9.8–20.1)
CALCIUM SERPL-MCNC: 9.3 MG/DL (ref 7.8–10.44)
CALCIUM SERPL-MCNC: 9.9 MG/DL (ref 7.8–10.44)
CHLORIDE SERPL-SCNC: 102 MMOL/L (ref 98–107)
CHLORIDE SERPL-SCNC: 98 MMOL/L (ref 98–107)
CK SERPL-CCNC: 72 U/L (ref 29–168)
CO2 SERPL-SCNC: 29 MMOL/L (ref 22–29)
CO2 SERPL-SCNC: 33 MMOL/L (ref 22–29)
CREAT CL PREDICTED SERPL C-G-VRATE: 0 ML/MIN (ref 70–130)
CREAT CL PREDICTED SERPL C-G-VRATE: 0 ML/MIN (ref 70–130)
EOSINOPHIL # BLD AUTO: 0.2 THOU/UL (ref 0–0.7)
EOSINOPHIL NFR BLD AUTO: 1.5 % (ref 0–10)
GLOBULIN SER CALC-MCNC: 3.3 G/DL (ref 2.4–3.5)
GLUCOSE SERPL-MCNC: 178 MG/DL (ref 70–105)
GLUCOSE SERPL-MCNC: 269 MG/DL (ref 70–105)
HGB BLD-MCNC: 9.3 G/DL (ref 12–16)
IRON SERPL-MCNC: 28 UG/DL (ref 50–170)
LYMPHOCYTES # BLD AUTO: 1.3 THOU/UL (ref 1.2–3.4)
LYMPHOCYTES NFR BLD AUTO: 12.1 % (ref 21–51)
MCH RBC QN AUTO: 23.2 PG (ref 27–31)
MCV RBC AUTO: 78.3 FL (ref 78–98)
MDIFF COMPLETE?: YES
MONOCYTES # BLD AUTO: 0.4 THOU/UL (ref 0.11–0.59)
MONOCYTES NFR BLD AUTO: 4.1 % (ref 0–10)
NEUTROPHILS # BLD AUTO: 8.6 THOU/UL (ref 1.4–6.5)
NEUTROPHILS NFR BLD AUTO: 81.5 % (ref 42–75)
OVALOCYTES BLD QL SMEAR: (no result) (100X)
PLATELET # BLD AUTO: 368 THOU/UL (ref 130–400)
POTASSIUM SERPL-SCNC: 4.5 MMOL/L (ref 3.5–5.1)
POTASSIUM SERPL-SCNC: 5.1 MMOL/L (ref 3.5–5.1)
RBC # BLD AUTO: 4.02 MILL/UL (ref 4.2–5.4)
SODIUM SERPL-SCNC: 138 MMOL/L (ref 136–145)
SODIUM SERPL-SCNC: 142 MMOL/L (ref 136–145)
TROPONIN I SERPL DL<=0.01 NG/ML-MCNC: (no result) NG/ML (ref ?–0.03)
TROPONIN I SERPL DL<=0.01 NG/ML-MCNC: (no result) NG/ML (ref ?–0.03)
UIBC SERPL-MCNC: 318 MCG/DL (ref 265–497)
UIBC SERPL-MCNC: 318 MCG/DL (ref 265–497)
WBC # BLD AUTO: 10.5 THOU/UL (ref 4.8–10.8)

## 2019-12-16 PROCEDURE — 96374 THER/PROPH/DIAG INJ IV PUSH: CPT

## 2019-12-16 PROCEDURE — 84484 ASSAY OF TROPONIN QUANT: CPT

## 2019-12-16 PROCEDURE — 71045 X-RAY EXAM CHEST 1 VIEW: CPT

## 2019-12-16 PROCEDURE — 96375 TX/PRO/DX INJ NEW DRUG ADDON: CPT

## 2019-12-16 PROCEDURE — 82274 ASSAY TEST FOR BLOOD FECAL: CPT

## 2019-12-16 PROCEDURE — 83550 IRON BINDING TEST: CPT

## 2019-12-16 PROCEDURE — 94640 AIRWAY INHALATION TREATMENT: CPT

## 2019-12-16 PROCEDURE — 85025 COMPLETE CBC W/AUTO DIFF WBC: CPT

## 2019-12-16 PROCEDURE — 81001 URINALYSIS AUTO W/SCOPE: CPT

## 2019-12-16 PROCEDURE — 83735 ASSAY OF MAGNESIUM: CPT

## 2019-12-16 PROCEDURE — 80053 COMPREHEN METABOLIC PANEL: CPT

## 2019-12-16 PROCEDURE — 93005 ELECTROCARDIOGRAM TRACING: CPT

## 2019-12-16 PROCEDURE — 36416 COLLJ CAPILLARY BLOOD SPEC: CPT

## 2019-12-16 PROCEDURE — 80048 BASIC METABOLIC PNL TOTAL CA: CPT

## 2019-12-16 PROCEDURE — 82550 ASSAY OF CK (CPK): CPT

## 2019-12-16 PROCEDURE — 82728 ASSAY OF FERRITIN: CPT

## 2019-12-16 PROCEDURE — 83540 ASSAY OF IRON: CPT

## 2019-12-16 PROCEDURE — 93798 PHYS/QHP OP CAR RHAB W/ECG: CPT

## 2019-12-16 PROCEDURE — 36415 COLL VENOUS BLD VENIPUNCTURE: CPT

## 2019-12-16 PROCEDURE — 94760 N-INVAS EAR/PLS OXIMETRY 1: CPT

## 2019-12-16 PROCEDURE — 83880 ASSAY OF NATRIURETIC PEPTIDE: CPT

## 2019-12-16 NOTE — RAD
XR Chest 1 View Portable



HISTORY: Dyspnea



COMPARISON: None



FINDINGS: The heart size is mildly prominent but stable. The lungs are well expanded without focal ar
eas of consolidation, pneumothorax or pleural effusions. There is mild pulmonary vascular

congestion. Left-sided Port-A-Cath remains in place.



Reported By: Jeovanny Lemus 

Electronically Signed:  12/16/2019 11:11 AM

## 2019-12-16 NOTE — HP
PRESENTING COMPLAINT:  Shortness of breath and wheeze with cough since 1 week.



HISTORY OF PRESENT ILLNESS:  Hailey Vega is a 54-year-old  female

with past medical history of longstanding hypertension, relatively uncontrolled,

diabetes mellitus type 2, history of non-Hodgkin's lymphoma treated with

chemotherapy for 2 years, diagnosed 3 years ago, she has not seen her oncologist

since over the last one year, recent admission 2 months ago for shortness of breath

with echocardiogram showing EF of about 65%, although for study, but also noted

grade 3 diastolic dysfunction with elevated right ventricular systolic pressure at

41 mm Hg.  The patient presented because of worsening shortness of breath and

wheezing since one week.  She admits to a 20 pounds weight gain since the last one

month after her last hospitalization.  She denies any sputum.  She denies any fever

or chills.  She denies any cough contact.  Shortness of breath initially started out

with exertion, but later became at rest.  She has intermittent cough that is

nonproductive.  She admits to orthopnea with paroxysmal nocturnal dyspnea.  She

admits to increasing body swelling.  She denies any recent travel.  No chest pain. 



PAST MEDICAL HISTORY:  Hypertension, diabetes mellitus, non-Hodgkin's lymphoma,

obesity. 



PAST SURGICAL HISTORY:  Unknown.



ALLERGIES:  PENICILLIN.



SOCIAL HISTORY:  The patient is a lifelong nonsmoker.  No history of alcohol or

illicit drug use. 



FAMILY HISTORY:  Significant for history of CAD and CVA in her mother.



REVIEW OF SYSTEMS:  All systems reviewed x14 were negative.  The patient denies any

hematochezia or hematemesis. 



PHYSICAL EXAMINATION:

VITAL SIGNS:  Initially on presentation, blood pressure was 152/63 eight hours ago.

Current blood pressure of 191/81, pulse of 76, respiratory rate of 19, on 2 L nasal

cannula with O2 saturation 100%. 

GENERAL:  Moderately obese, middle-aged female, not in any distress on nasal cannula

O2. 

HEENT:  Head is atraumatic, normocephalic.  Pupils equal, reactive to light.  No

periorbital edema.  Pink conjunctivae.  Moist oral mucosa. 

NECK:  No JVD.  No carotid bruit. 

RESPIRATORY:  Coarse crepitation at bilateral bases.  No wheeze or rhonchi elicited. 

CARDIOVASCULAR:  S1, S2.  Rate and rhythm regular.  No reproducible chest wall

tenderness. 

ABDOMEN:  Obese, soft.  Bowel sounds positive.  No epigastric tenderness.  No CVA

tenderness.  No suprapubic fullness. 

MUSCULOSKELETAL/EXTREMITIES:  1 to 2+ bilateral pedal edema, more prominent over the

left lower extremity.  No calf tenderness.  Negative Homans sign. 

NEUROLOGIC:  The patient is alert conversant.  No neurological focal motor deficit.



LABORATORY DATA:  Chest x-ray shows mild pulmonary congestive changes, otherwise no

acute cardiological event.  Echo from 11/30/2019, reviewed with diastolic

dysfunction and normal EF, although poor study and MUGA scan recommended.  Perfusion

study from 11/30/2019 also show normal perfusion.  Labs today, creatinine of 1.45,

potassium of 5.0, hemoglobin of 9.6.  EKG shows normal sinus rhythm, no ST-segment

changes. 



IMPRESSION:  

1. Acute diastolic congestive heart failure exacerbation, likely due to uncontrolled

hypertension and unrestricted salt intake. 

2. Questionable history of chronic obstructive pulmonary disease although patient

denies record. 

3. Hypertension-uncontrolled.

4. Chronic kidney disease stage 3.

5. Obesity.

6. Diabetes mellitus.



PLAN:  We will manage the patient for the following in an inpatient setting.

1. Acute diastolic CHF exacerbation.  We will start the patient on IV Lasix 80 mg

q.12h for now.  Monitor intake and output.  Follow daily weights.  No need for

repeat echocardiogram given poor studies due to size and last echo.  Possibility of

pulmonary hypertension consistent with patient symptoms.  The patient might benefit

from pulmonary evaluation for elevated pulmonary hypertension. 

2. COPD-doubt and less likely at this time.  We will do DuoNeb p.r.n. but no

steroids as likely it will worsen CHF exacerbation.  Follow with diuresis. 

3. Hypertension, on amlodipine and Lasix.  We will add hydralazine since elevated

creatinine for now. 

4. CKD stage 3.  We will consult Nephrology.  Progressive CKD may be contributing to

patient's fluid retention.  It may also be beneficial to rule out nephrotic range

proteinuria causing the patient's fluid retention. 

5. Advanced directive, the patient is full code.

6. DVT prophylaxis with subcutaneous Lovenox. 

7. Diabetes mellitus.  Continue home insulin detemir dose as well as glyburide.  We

will hold metformin for now.  Insulin sliding scale with Accu-Cheks. 



Total time spent in review of record, discussion with patient and explaining fluid

restriction as well as reduce salt intake and followup greater than 60 minutes. 







Job ID:  [id number]

## 2019-12-17 LAB
ANION GAP SERPL CALC-SCNC: 16 MMOL/L (ref 10–20)
BACTERIA UR QL AUTO: (no result) HPF
BASOPHILS # BLD AUTO: 0 THOU/UL (ref 0–0.2)
BASOPHILS NFR BLD AUTO: 0.1 % (ref 0–1)
BUN SERPL-MCNC: 24 MG/DL (ref 9.8–20.1)
CALCIUM SERPL-MCNC: 9.4 MG/DL (ref 7.8–10.44)
CHLORIDE SERPL-SCNC: 99 MMOL/L (ref 98–107)
CO2 SERPL-SCNC: 26 MMOL/L (ref 22–29)
CREAT CL PREDICTED SERPL C-G-VRATE: 98 ML/MIN (ref 70–130)
EOSINOPHIL # BLD AUTO: 0.2 THOU/UL (ref 0–0.7)
EOSINOPHIL NFR BLD AUTO: 1.3 % (ref 0–10)
GLUCOSE SERPL-MCNC: 296 MG/DL (ref 70–105)
HGB BLD-MCNC: 9 G/DL (ref 12–16)
LYMPHOCYTES # BLD: 0.9 THOU/UL (ref 1.2–3.4)
LYMPHOCYTES NFR BLD AUTO: 8 % (ref 21–51)
MAGNESIUM SERPL-MCNC: 1.3 MG/DL (ref 1.6–2.6)
MCH RBC QN AUTO: 23.4 PG (ref 27–31)
MCV RBC AUTO: 77.9 FL (ref 78–98)
MONOCYTES # BLD AUTO: 0.5 THOU/UL (ref 0.11–0.59)
MONOCYTES NFR BLD AUTO: 4 % (ref 0–10)
NEUTROPHILS # BLD AUTO: 9.9 THOU/UL (ref 1.4–6.5)
NEUTROPHILS NFR BLD AUTO: 86.6 % (ref 42–75)
PLATELET # BLD AUTO: 298 THOU/UL (ref 130–400)
POTASSIUM SERPL-SCNC: 4.7 MMOL/L (ref 3.5–5.1)
PROT UR STRIP.AUTO-MCNC: 50 MG/DL
RBC # BLD AUTO: 3.86 MILL/UL (ref 4.2–5.4)
RBC UR QL AUTO: (no result) HPF (ref 0–3)
SODIUM SERPL-SCNC: 136 MMOL/L (ref 136–145)
WBC # BLD AUTO: 11.5 THOU/UL (ref 4.8–10.8)
WBC UR QL AUTO: (no result) HPF (ref 0–3)

## 2019-12-17 RX ADMIN — INSULIN GLARGINE SCH: 100 INJECTION, SOLUTION SUBCUTANEOUS at 01:12

## 2019-12-17 RX ADMIN — INSULIN GLARGINE SCH: 100 INJECTION, SOLUTION SUBCUTANEOUS at 21:54

## 2019-12-17 RX ADMIN — INSULIN LISPRO PRN UNIT: 100 INJECTION, SOLUTION INTRAVENOUS; SUBCUTANEOUS at 13:33

## 2019-12-17 RX ADMIN — INSULIN GLARGINE SCH MLS: 100 INJECTION, SOLUTION SUBCUTANEOUS at 10:54

## 2019-12-17 NOTE — CON
DATE OF CONSULTATION:  



REASON FOR CONSULTATION:  CKD, stage 3.



HISTORY OF PRESENT ILLNESS:  This is a very pleasant 54-year-old female, who was

admitted last night for shortness of breath and wheezing.  The patient denies any

nausea, vomiting, or chest pain.  Her baseline creatinine has ranged anywhere from

__________ since last year. 



PAST MEDICAL HISTORY:  Hypertension, diabetes mellitus, obesity, non-Hodgkin

lymphoma. 



SURGICAL HISTORY:  Unknown.



ALLERGIES:  REVIEWED.



HOME MEDICATIONS:  List reviewed.



HOSPITAL MEDICATIONS:  Reviewed.



SOCIAL HISTORY:  No alcohol or drug use.



FAMILY HISTORY:  Negative for ESRD.



REVIEW OF SYSTEMS:  A 15-point review of system was performed, negative except for

positive noted above. 

GENERAL:   

HEAD:   

NECK:  No swelling or lumps. 

NOSE:  No epistaxis or discharge.   

EYES:  No diplopia or pain. 

RESPIRATORY:   

CARDIOVASCULAR:   

GASTROINTESTINAL:   

/GYN:   

MUSCULOSKELETAL:  No joint pain. 

NEUROPSYCHIATIC SYSTEMS:  No suicidal ideation.  No ideation. 

SKIN:  Denies any rash or ulcer. 

CONSTITUTIONAL:   No fever or chills.



PHYSICAL EXAMINATION:

CONSTITUTIONAL:  The patient is awake and alert. 

VITAL SIGNS: Afebrile. Pulse 85, breathing 16, blood pressure 147/68. 

GENERAL APPEARANCE AND MENTAL STATUS:  Fair. 

HEAD/NECK:  Normocephalic.   Atraumatic. 

EYES:  EOMI.  No deformity. 

EARS:  Clear.  No ulcers. 

NOSE:   Intact.  No lesions. 

MOUTH:  Clear.  No discharge. 

THROAT:  Clear.  No exudate. 

LUNGS:   Clear.  No crackles. 

CARDIAC:   S1, S2.  No rub. 

ABDOMEN:   Benign.  Bowel sounds positive. 

GENITALIA/RECTUM:  Silveira absent. 

BACK/EXTREMITIES:  Edema 0+.   

NEUROLOGICAL:  Alert and motor intact.                     

SKIN:   

LYMPHATICS:



LABORATORY DATA:  Reviewed.



ASSESSMENT AND PLAN:  

1. Chronic kidney disease, stage  __________ with acute kidney injury due to

hypertension, stable. 

2. Anemia, stable.

3. Medication based on GFR, appropriate. No indication for dialysis.

4. Proteinuria, it is controlled.







Job ID:  [id number]

## 2019-12-17 NOTE — PDOC.HOSPP
- Subjective


Subjective: 


Pt is doing well. She just showered without difficulty.





- Objective


Vital Signs & Weight: 


 Vital Signs (12 hours)











  Temp Pulse Resp BP BP Pulse Ox


 


 12/17/19 07:21  97.7 F  85  18   147/68 H  98


 


 12/17/19 05:37   79   172/78 H  


 


 12/17/19 03:24  97.8 F  83  18   168/81 H  93 L


 


 12/17/19 01:40       99


 


 12/17/19 01:19   83   159/77 H  


 


 12/17/19 00:47  97.5 F L  83  16   159/77 H  99


 


 12/17/19 00:46  97.5 F L  83  16   159/77 H  99








 Weight











Weight                         342 lb














I&O: 


 











 12/16/19 12/17/19 12/18/19





 06:59 06:59 06:59


 


Intake Total  400 


 


Output Total  1500 


 


Balance  -1100 











Result Diagrams: 


 12/17/19 04:19





 12/17/19 04:19


Additional Labs: 


 Accuchecks











  12/17/19





  05:38


 


POC Glucose  283 H














Hospitalist ROS





- Medication


Medications: 


Active Medications











Generic Name Dose Route Start Last Admin





  Trade Name Freq  PRN Reason Stop Dose Admin


 


Amlodipine Besylate  10 mg  12/17/19 09:00  12/17/19 10:08





  Norvasc  PO   10 mg





  DAILY SANDEE   Administration





     





     





     





     


 


Atorvastatin Calcium  40 mg  12/17/19 09:00  12/17/19 10:08





  Lipitor  PO   40 mg





  DAILY SANDEE   Administration





     





     





     





     


 


Enoxaparin Sodium  40 mg  12/17/19 09:00  12/17/19 10:08





  Lovenox  SC   40 mg





  0900 SANDEE   Administration





     





     





     





     


 


Furosemide  40 mg  12/17/19 09:15  12/17/19 10:10





  Lasix  SLOW IVP  12/17/19 11:15  40 mg





  NOW SANDEE   Administration





     





     





     





     


 


Gabapentin  300 mg  12/16/19 21:00  12/17/19 10:09





  Neurontin  PO   300 mg





  TID SANDEE   Administration





     





     





     





     


 


Glyburide  5 mg  12/17/19 08:00  12/17/19 10:08





  Diabeta  PO   5 mg





  BID-WM SANDEE   Administration





     





     





     





     


 


Hydralazine HCl  10 mg  12/16/19 20:17  12/17/19 05:37





  Apresoline  SLOW IVP   10 mg





  Q4H PRN   Administration





  Blood Pressure   





     





     





     


 


Hydralazine HCl  25 mg  12/16/19 21:00  12/17/19 10:09





  Apresoline  PO   25 mg





  TID SANDEE   Administration





     





     





     





     


 


Insulin Glargine 75 units/  0.75 mls @ 0 mls/hr  12/16/19 21:00  12/17/19 01:12





  Miscellaneous Medication  SC   Not Given





  BID SANDEE   





     





     





     





     


 


Levofloxacin  750 mg  12/17/19 09:00  12/17/19 10:09





  Levaquin  PO   750 mg





  DAILY SANDEE   Administration





     





     





     





     














- Exam


General Appearance: NAD, awake alert


Heart: RRR, no murmur, no gallops, no rubs


Respiratory: CTAB, no wheezes, no rales, no ronchi, normal chest expansion, no 

tachypnea


Extremities: 2+ LE edema





Hosp A/P


(1) CKD (chronic kidney disease), stage III


Code(s): N18.3 - CHRONIC KIDNEY DISEASE, STAGE 3 (MODERATE)   Status: Acute   





(2) DM type 2 (diabetes mellitus, type 2)


Status: Chronic   


Qualifiers: 


   Diabetes mellitus long term insulin use: with long term use   Diabetes 

mellitus complication status: with kidney complications   Diabetes mellitus 

complication detail: with chronic kidney disease   Chronic kidney disease stage

: stage 3 (moderate)   Qualified Code(s): E11.22 - Type 2 diabetes mellitus 

with diabetic chronic kidney disease; N18.3 - Chronic kidney disease, stage 3 (

moderate); Z79.4 - Long term (current) use of insulin   





(3) HTN (hypertension)


Code(s): I10 - ESSENTIAL (PRIMARY) HYPERTENSION   Status: Chronic   


Qualifiers: 


   Hypertension type: essential hypertension   Qualified Code(s): I10 - 

Essential (primary) hypertension   





(4) Morbid obesity with BMI of 50.0-59.9, adult


Code(s): E66.01 - MORBID (SEVERE) OBESITY DUE TO EXCESS CALORIES; Z68.43 - BODY 

MASS INDEX (BMI) 50.0-59.9, ADULT   Status: Chronic   





(5) VANE (obstructive sleep apnea)


Code(s): G47.33 - OBSTRUCTIVE SLEEP APNEA (ADULT) (PEDIATRIC)   Status: Chronic

   





(6) Iron deficiency anemia


Code(s): D50.9 - IRON DEFICIENCY ANEMIA, UNSPECIFIED   Status: Acute   





(7) Hypomagnesemia


Code(s): E83.42 - HYPOMAGNESEMIA   Status: Acute   





- Plan


Acute on chronic exacerbation of diastolic CHF stage III:


Dec Lasix from 80 IV BID to 40 IV BID. 


Will monitor potassium and replace orally prn.





Acute hypoxic respiratory failure:


Pulmonology consulted. 


Will follow recommendations.


Pt off O2 and sats are good on room air.





CKD Stage IIIB:


Nephrology consulted.


Will follow recommendations.





DM Type II:


Continue home glyburide and insulin glargine, humalog.


Accuchecks.





Anemia:


Start oral iron supplementation.





Hypomag:


IV Mag.


Recheck in am. 





Do not suspect UTI. 


RICARDO Carrillo.

## 2019-12-18 LAB
ANION GAP SERPL CALC-SCNC: 14 MMOL/L (ref 10–20)
BASOPHILS # BLD AUTO: 0 THOU/UL (ref 0–0.2)
BASOPHILS NFR BLD AUTO: 0.2 % (ref 0–1)
BUN SERPL-MCNC: 27 MG/DL (ref 9.8–20.1)
CALCIUM SERPL-MCNC: 9.6 MG/DL (ref 7.8–10.44)
CHLORIDE SERPL-SCNC: 97 MMOL/L (ref 98–107)
CO2 SERPL-SCNC: 33 MMOL/L (ref 22–29)
CREAT CL PREDICTED SERPL C-G-VRATE: 101 ML/MIN (ref 70–130)
EOSINOPHIL # BLD AUTO: 0.2 THOU/UL (ref 0–0.7)
EOSINOPHIL NFR BLD AUTO: 1.6 % (ref 0–10)
GLUCOSE SERPL-MCNC: 123 MG/DL (ref 70–105)
HGB BLD-MCNC: 10.1 G/DL (ref 12–16)
LYMPHOCYTES # BLD: 2.2 THOU/UL (ref 1.2–3.4)
LYMPHOCYTES NFR BLD AUTO: 19 % (ref 21–51)
MCH RBC QN AUTO: 23.5 PG (ref 27–31)
MCV RBC AUTO: 78.2 FL (ref 78–98)
MONOCYTES # BLD AUTO: 0.6 THOU/UL (ref 0.11–0.59)
MONOCYTES NFR BLD AUTO: 4.8 % (ref 0–10)
NEUTROPHILS # BLD AUTO: 8.6 THOU/UL (ref 1.4–6.5)
NEUTROPHILS NFR BLD AUTO: 74.4 % (ref 42–75)
PLATELET # BLD AUTO: 378 THOU/UL (ref 130–400)
POTASSIUM SERPL-SCNC: 3.7 MMOL/L (ref 3.5–5.1)
RBC # BLD AUTO: 4.29 MILL/UL (ref 4.2–5.4)
SODIUM SERPL-SCNC: 140 MMOL/L (ref 136–145)
WBC # BLD AUTO: 11.6 THOU/UL (ref 4.8–10.8)

## 2019-12-18 RX ADMIN — INSULIN LISPRO PRN UNIT: 100 INJECTION, SOLUTION INTRAVENOUS; SUBCUTANEOUS at 11:21

## 2019-12-18 RX ADMIN — Medication PRN ML: at 20:55

## 2019-12-18 RX ADMIN — Medication PRN ML: at 13:30

## 2019-12-18 RX ADMIN — INSULIN GLARGINE SCH MLS: 100 INJECTION, SOLUTION SUBCUTANEOUS at 20:56

## 2019-12-18 RX ADMIN — INSULIN GLARGINE SCH MLS: 100 INJECTION, SOLUTION SUBCUTANEOUS at 08:31

## 2019-12-18 NOTE — PDOC.HOSPP
- Subjective


Subjective: 





Pt reports that she is doing well today and is about to get up and walk with 

PT. 


Breathing difficulty has resolved.





- Objective


Vital Signs & Weight: 


 Vital Signs (12 hours)











  Temp Pulse Resp BP BP Pulse Ox


 


 12/18/19 08:29  97.8 F  92  20   138/68  95


 


 12/18/19 07:30       94 L


 


 12/18/19 03:43   90   169/73 H  


 


 12/18/19 03:00  98.3 F  93  18   169/80 H  94 L


 


 12/17/19 23:43      157/72 H 








 Weight











Weight                         342 lb














I&O: 


 











 12/17/19 12/18/19 12/19/19





 06:59 06:59 06:59


 


Intake Total 400 1430 


 


Output Total 1500 3050 


 


Balance -1100 -1620 











Result Diagrams: 


 12/18/19 03:41





 12/18/19 03:41


Additional Labs: 


 Accuchecks











  12/18/19 12/17/19 12/17/19





  03:54 20:21 16:38


 


POC Glucose  131 H  164 H  147 H














  12/17/19





  11:19


 


POC Glucose  240 H














Hospitalist ROS





- Medication


Medications: 


Active Medications











Generic Name Dose Route Start Last Admin





  Trade Name Freq  PRN Reason Stop Dose Admin


 


Albuterol Sulfate  90 mg  12/17/19 10:09  12/18/19 03:56





  Ventolin  NEB   90 mg





  DAILY PRN   Administration





  Dyspnea/Wheezing/SOB   





     





     





     


 


Atorvastatin Calcium  40 mg  12/17/19 09:00  12/18/19 08:30





  Lipitor  PO   40 mg





  DAILY SANDEE   Administration





     





     





     





     


 


Enoxaparin Sodium  40 mg  12/17/19 09:00  12/18/19 08:31





  Lovenox  SC   40 mg





  0900 SANDEE   Administration





     





     





     





     


 


Ferrous Sulfate  325 mg  12/18/19 08:00  12/18/19 08:30





  Feosol  PO   325 mg





  QAM-WM SANDEE   Administration





     





     





     





     


 


Furosemide  40 mg  12/17/19 14:00  12/18/19 05:11





  Lasix  SLOW IVP   40 mg





  0600,1400 SANDEE   Administration





     





     





     





     


 


Gabapentin  300 mg  12/16/19 21:00  12/18/19 08:29





  Neurontin  PO   300 mg





  TID SANDEE   Administration





     





     





     





     


 


Glyburide  5 mg  12/17/19 08:00  12/18/19 08:30





  Diabeta  PO   5 mg





  BID-WM SANDEE   Administration





     





     





     





     


 


Hydralazine HCl  10 mg  12/16/19 20:17  12/18/19 03:43





  Apresoline  SLOW IVP   10 mg





  Q4H PRN   Administration





  Blood Pressure   





     





     





     


 


Hydralazine HCl  25 mg  12/16/19 21:00  12/18/19 08:30





  Apresoline  PO   25 mg





  TID SANDEE   Administration





     





     





     





     


 


Insulin Glargine 75 units/  0.75 mls @ 0 mls/hr  12/16/19 21:00  12/18/19 08:31





  Miscellaneous Medication  SC   0.75 mls





  BID SANDEE   Administration





     





     





     





     


 


Insulin Human Lispro  0 units  12/16/19 20:18  12/17/19 13:33





  Humalog  SC   6 unit





  .AGGRESSIVE SLIDING  PRN   Administration





  Aggressive Correctional Scale   





     





     





     


 


Lisinopril  5 mg  12/18/19 09:00  12/18/19 09:12





  Zestril  PO   5 mg





  DAILY SANDEE   Administration





     





     





     





     


 


Spironolactone  25 mg  12/18/19 09:00  12/18/19 08:30





  Aldactone  PO   25 mg





  DAILY SANDEE   Administration





     





     





     





     


 


Venlafaxine HCl  75 mg  12/18/19 09:00  12/18/19 08:30





  Effexor  PO   75 mg





  DAILY SANDEE   Administration





     





     





     





     














- Exam


General Appearance: NAD, awake alert


Heart: RRR, no gallops, no rubs, murmur present (I/VI systolic murmur at right 

upper sternal border)


Respiratory: CTAB, no wheezes, no rales, no ronchi, normal chest expansion, no 

tachypnea


Gastrointestinal: soft, non-tender, non-distended


Extremities - other findings: trace edema





Hosp A/P


(1) CKD (chronic kidney disease), stage III


Code(s): N18.3 - CHRONIC KIDNEY DISEASE, STAGE 3 (MODERATE)   Status: Acute   





(2) DM type 2 (diabetes mellitus, type 2)


Status: Chronic   


Qualifiers: 


   Diabetes mellitus long term insulin use: with long term use   Diabetes 

mellitus complication status: with kidney complications   Diabetes mellitus 

complication detail: with chronic kidney disease   Chronic kidney disease stage

: stage 3 (moderate)   Qualified Code(s): E11.22 - Type 2 diabetes mellitus 

with diabetic chronic kidney disease; N18.3 - Chronic kidney disease, stage 3 (

moderate); Z79.4 - Long term (current) use of insulin   





(3) HTN (hypertension)


Code(s): I10 - ESSENTIAL (PRIMARY) HYPERTENSION   Status: Chronic   


Qualifiers: 


   Hypertension type: essential hypertension   Qualified Code(s): I10 - 

Essential (primary) hypertension   





(4) Morbid obesity with BMI of 50.0-59.9, adult


Code(s): E66.01 - MORBID (SEVERE) OBESITY DUE TO EXCESS CALORIES; Z68.43 - BODY 

MASS INDEX (BMI) 50.0-59.9, ADULT   Status: Chronic   





(5) VANE (obstructive sleep apnea)


Code(s): G47.33 - OBSTRUCTIVE SLEEP APNEA (ADULT) (PEDIATRIC)   Status: Chronic

   





(6) Iron deficiency anemia


Code(s): D50.9 - IRON DEFICIENCY ANEMIA, UNSPECIFIED   Status: Acute   





(7) Hypomagnesemia


Code(s): E83.42 - HYPOMAGNESEMIA   Status: Acute   





- Plan


Acute on chronic exacerbation of diastolic CHF grade I/III:


Switch from IV Lasix to PO prn.


Will discharge patient with oral potassium to take along with Lasix.


Discontinued Amlodipine and added Lisinopril 5 mg and Coreg 3.125 mg.


Recommend patient to follow up with cardiology outpatient.





Acute hypoxic respiratory failure:


Pt off O2 and sats are good on room air.


Resolved.





CKD Stage IIIB:


Nephrology said no dialysis needed at this time. 


Proteinuria controlled.





DM Type II:


Continue home glyburide and insulin glargine, humalog.


Accuchecks.





Anemia:


Start oral iron supplementation.





Hypomag:


IV Mag. 





Pt to ambulate with PT this morning. 


If doing well, will discharge this afternoon.

## 2019-12-18 NOTE — PRG
DATE OF SERVICE:  12/18/2019



SUBJECTIVE:  A 54-year-old female, being seen for acute kidney injury.  The patient

denies any nausea, vomiting, or chest pain. 



OBJECTIVE:  GENERAL:  The patient is awake and alert. 

VITAL SIGNS:  Afebrile, pulse 101, breathing 16, blood pressure 131/70. 

GENERAL APPEARANCE AND MENTAL STATUS:  Fair. 

HEAD/NECK:  Normocephalic.   Atraumatic. 

EYES:  EOMI.  No deformity. 

EARS:  Clear.  No ulcers. 

NOSE:  Intact.  No lesions. 

MOUTH:  Clear.  No discharge. 

THROAT:  Clear.  No exudate. 

LUNGS:  Clear.  No crackles. 

CARDIAC:  S1, S2.  No rub. 

ABDOMEN:  Benign.  Bowel sounds positive. 

GENITALIA/RECTUM:  Silveira absent. 

BACK/EXTREMITIES:  Edema 0+. 

NEUROLOGICAL:  Alert and motor intact. 

SKIN:   

LYMPHATICS:



LABORATORY DATA:  Reviewed.



ASSESSMENT AND PLAN:  

1. Chronic kidney disease, stage 3, stable.

2. Hypertension, stable.

3. Anemia, stable.

4. Medication based on GFR, appropriate.







Job ID:  [id number]

## 2019-12-19 VITALS — DIASTOLIC BLOOD PRESSURE: 66 MMHG | TEMPERATURE: 97.9 F | SYSTOLIC BLOOD PRESSURE: 113 MMHG

## 2019-12-19 RX ADMIN — Medication PRN ML: at 06:24

## 2019-12-19 RX ADMIN — INSULIN LISPRO PRN UNIT: 100 INJECTION, SOLUTION INTRAVENOUS; SUBCUTANEOUS at 10:39

## 2019-12-19 RX ADMIN — INSULIN GLARGINE SCH MLS: 100 INJECTION, SOLUTION SUBCUTANEOUS at 09:49

## 2019-12-19 NOTE — PDOC.HOSPP
- Subjective


Subjective: 





Patient reports that she is doing well and breathing without difficulty. 


She is resting comfortably in the bed. 





- Objective


Vital Signs & Weight: 


 Vital Signs (12 hours)











  Temp Pulse Resp BP BP Pulse Ox


 


 12/19/19 07:59       94 L


 


 12/19/19 07:56  98.2 F  107 H  18   132/67  94 L


 


 12/19/19 04:16  97.7 F  106 H  16  122/59 L   93 L








 Weight











Weight                         339 lb 8.19 oz














I&O: 


 











 12/18/19 12/19/19 12/20/19





 06:59 06:59 06:59


 


Intake Total 1430 1990 


 


Output Total 3050 1800 


 


Balance -1620 190 











Result Diagrams: 


 12/18/19 03:41





 12/18/19 03:41


Additional Labs: 


 Accuchecks











  12/18/19 12/18/19 12/18/19





  20:41 16:59 10:58


 


POC Glucose  209 H  138 H  168 H














Hospitalist ROS





- Medication


Medications: 


Active Medications











Generic Name Dose Route Start Last Admin





  Trade Name Freq  PRN Reason Stop Dose Admin


 


Albuterol Sulfate  90 mg  12/17/19 10:09  12/18/19 03:56





  Ventolin  NEB   90 mg





  DAILY PRN   Administration





  Dyspnea/Wheezing/SOB   





     





     





     


 


Atorvastatin Calcium  40 mg  12/17/19 09:00  12/18/19 08:30





  Lipitor  PO   40 mg





  DAILY SANDEE   Administration





     





     





     





     


 


Enoxaparin Sodium  40 mg  12/17/19 09:00  12/18/19 08:31





  Lovenox  SC   40 mg





  0900 SANDEE   Administration





     





     





     





     


 


Ferrous Sulfate  325 mg  12/18/19 08:00  12/18/19 08:30





  Feosol  PO   325 mg





  QAM-WM SANDEE   Administration





     





     





     





     


 


Furosemide  40 mg  12/17/19 14:00  12/19/19 06:24





  Lasix  SLOW IVP   40 mg





  0600,1400 SANDEE   Administration





     





     





     





     


 


Gabapentin  300 mg  12/16/19 21:00  12/18/19 20:55





  Neurontin  PO   300 mg





  TID SANDEE   Administration





     





     





     





     


 


Glyburide  5 mg  12/17/19 08:00  12/18/19 16:34





  Diabeta  PO   5 mg





  BID-WM SANDEE   Administration





     





     





     





     


 


Hydralazine HCl  10 mg  12/16/19 20:17  12/18/19 12:09





  Apresoline  SLOW IVP   10 mg





  Q4H PRN   Administration





  Blood Pressure   





     





     





     


 


Hydralazine HCl  25 mg  12/16/19 21:00  12/18/19 20:55





  Apresoline  PO   25 mg





  TID SANDEE   Administration





     





     





     





     


 


Insulin Glargine 75 units/  0.75 mls @ 0 mls/hr  12/16/19 21:00  12/18/19 20:56





  Miscellaneous Medication  SC   0.75 mls





  BID SANDEE   Administration





     





     





     





     


 


Insulin Human Lispro  0 units  12/16/19 20:18  12/18/19 11:21





  Humalog  SC   3 unit





  .AGGRESSIVE SLIDING  PRN   Administration





  Aggressive Correctional Scale   





     





     





     


 


Lisinopril  5 mg  12/18/19 09:00  12/18/19 09:12





  Zestril  PO   5 mg





  DAILY SANDEE   Administration





     





     





     





     


 


Sodium Chloride  10 ml  12/16/19 20:13  12/19/19 06:24





  Flush - Normal Saline  IVF   10 ml





  PRN PRN   Administration





  Saline Flush   





     





     





     


 


Spironolactone  25 mg  12/18/19 09:00  12/18/19 08:30





  Aldactone  PO   25 mg





  DAILY SANDEE   Administration





     





     





     





     


 


Venlafaxine HCl  75 mg  12/18/19 09:00  12/18/19 08:30





  Effexor  PO   75 mg





  DAILY SANDEE   Administration





     





     





     





     














- Exam


General Appearance: NAD, awake alert


Heart: RRR, no murmur, no gallops, no rubs


Respiratory: CTAB, no wheezes, no rales, no ronchi, normal chest expansion, no 

tachypnea, normal percussion


Gastrointestinal: soft, non-tender, non-distended


Extremities: 1+ LE edema





Hosp A/P


(1) CKD (chronic kidney disease), stage III


Code(s): N18.3 - CHRONIC KIDNEY DISEASE, STAGE 3 (MODERATE)   Status: Acute   





(2) DM type 2 (diabetes mellitus, type 2)


Status: Chronic   


Qualifiers: 


   Diabetes mellitus long term insulin use: with long term use   Diabetes 

mellitus complication status: with kidney complications   Diabetes mellitus 

complication detail: with chronic kidney disease   Chronic kidney disease stage

: stage 3 (moderate)   Qualified Code(s): E11.22 - Type 2 diabetes mellitus 

with diabetic chronic kidney disease; N18.3 - Chronic kidney disease, stage 3 (

moderate); Z79.4 - Long term (current) use of insulin   





(3) HTN (hypertension)


Code(s): I10 - ESSENTIAL (PRIMARY) HYPERTENSION   Status: Chronic   


Qualifiers: 


   Hypertension type: essential hypertension   Qualified Code(s): I10 - 

Essential (primary) hypertension   





(4) Morbid obesity with BMI of 50.0-59.9, adult


Code(s): E66.01 - MORBID (SEVERE) OBESITY DUE TO EXCESS CALORIES; Z68.43 - BODY 

MASS INDEX (BMI) 50.0-59.9, ADULT   Status: Chronic   





(5) VANE (obstructive sleep apnea)


Code(s): G47.33 - OBSTRUCTIVE SLEEP APNEA (ADULT) (PEDIATRIC)   Status: Chronic

   





(6) Iron deficiency anemia


Code(s): D50.9 - IRON DEFICIENCY ANEMIA, UNSPECIFIED   Status: Acute   





(7) Hypomagnesemia


Code(s): E83.42 - HYPOMAGNESEMIA   Status: Acute   





- Plan


Acute on chronic exacerbation of diastolic CHF grade I/III:


Will discharge pt on Lasix 20 PO daily and oral potassium.


On Lisinopril 5 mg and Coreg 6.25 mg.


Recommend patient to follow up with cardiology outpatient.





Acute hypoxic respiratory failure:


Resolved.


Will discharge patient with prn nebs.





CKD Stage IIIB:


Nephrology says renal function stable.


Follow up outpatient. 





DM Type II:


Continue home glyburide and insulin glargine, humalog.


Accuchecks.





Anemia:


Start oral iron supplementation.





Hypomag:


Awating level this morning 





Plan to discharge patient today after Mg level returns.


Follow up with PCP.

## 2019-12-19 NOTE — PRG
DATE OF SERVICE:  12/19/2019



SUBJECTIVE:  A 54-year-old female, being seen for acute kidney injury.  The patient

denies any nausea, vomiting, or chest pain. 



OBJECTIVE:  See above. 

Awake, alert, in no acute distress. 

CONSTITUTIONAL:  The patient is awake, alert. 

VITAL SIGNS:  Afebrile, pulse 76, breathing 16, blood pressure 113/66. 

GENERAL APPEARANCE AND MENTAL STATUS:  Fair. 

HEAD/NECK:  Normocephalic.   Atraumatic. 

EYES:  EOMI.  No deformity. 

EARS:  Clear.  No ulcers. 

NOSE:   Intact.  No lesions. 

MOUTH:  Clear.  No discharge. 

THROAT:  Clear.  No exudate. 

LUNGS:   Clear.  No crackles. 

CARDIAC:   S1, S2.  No rub. 

ABDOMEN:   Benign.  Bowel sounds positive. 

GENITALIA/RECTUM:  Silveira absent. 

BACK/EXTREMITIES:  Edema 0+. 

NEUROLOGICAL:  Alert and motor intact. 

SKIN: 

LYMPHATICS:



LABORATORY DATA:  Labs reviewed.



ASSESSMENT AND PLAN:  

1. Stage 3 chronic kidney disease, stable.

2. Hypertension, stable.

3. Anemia, stable.

4. Medication based on GFR appropriate.







Job ID:  [id number]

## 2019-12-20 NOTE — DIS
DATE OF ADMISSION:  12/16/2019



DATE OF DISCHARGE:  12/19/2019



DISCHARGE DIAGNOSES:  

1. Acute on chronic diastolic congestive heart failure.

2. Acute hypoxic respiratory failure.

3. Chronic kidney disease, stage IIIB.

4. Diabetes mellitus type 2. 

5. Anemia.

6. Hypomagnesemia.

7. Morbid obesity.

8. Obstructive sleep apnea.

9. Iron deficiency anemia.



HISTORY OF PRESENT ILLNESS:  The patient is a 54-year-old female, who had 
recently

been admitted to the hospital with decompensated heart failure, at which time, 
she

had an echocardiogram, which revealed diastolic dysfunction.  Ejection fraction

could not fully be elucidated on echo.  Therefore, she had a MUGA scan, which

revealed an ejection fraction of 73%.  The patient presented back to the 
hospital on

this occasion with shortness of breath over approximately 1 week.  She also had 
a

20-pound weight gain and it was unclear that she had been completely compliant 
with

diet recommendations.  Her chest x-ray showed mild pulmonary congestive changes.

Creatinine was 1.5.  EKG showed normal sinus rhythm with no ST-segment changes. 



HOSPITAL COURSE:  The patient was admitted to the hospital with decompensated

congestive heart failure.  She had 2+ peripheral edema.

She was subsequently diuresed and she was seen in

consultation by Nephrology.  Her GFR was at her baseline if not a little better.

She responded well to diuresis, fluid restrictions, and appropriate diet and 
once

her symptoms had improved, edema improved, she was able to wean off the oxygen 

and had good oxygen saturations on room

air.  She was noted to have hypomagnesemia with some difficulty in getting her

supplementation ordered appropriately.  At the time of discharge, it was at 1.5
, and

she would be prescribed oral supplementation.  She did have iron studies with an

iron level of 28, TIBC 318, and percent saturation 9% with ferritin levels of 
107 on

the day of discharge. 



Please see the dictated progress note for physical exam findings.



DISPOSITION:  The patient is discharged to home.



ACTIVITY:  As tolerated.  She will be on a heart healthy, low-sodium diet.



DISCHARGE MEDICATIONS:  Will include;

1. Carvedilol 6.25 mg one p.o. b.i.d.

2. Ferrous sulfate 325 mg daily.

3. DuoNebs __________ t.i.d.

4. Lisinopril 5 mg daily.

5. Magnesium 200 mg b.i.d.

6. Insulin Humalog 15 units subcu t.i.d.

7. Detemir insulin 75 units subcu b.i.d.

8. Potassium chloride 10 mEq p.o. daily.

9. Atorvastatin 40 mg daily.

10. Lasix 20 mg b.i.d.

11. Protonix 40 mg daily.

12. Vitamin D2 5000 units q.week.

13. Gabapentin 300 mg t.i.d.

14. Glyburide 5 mg b.i.d.

15. Venlafaxine 75 mg daily.

16. Aldactone 25 mg daily.

17. Albuterol ProAir 90 mcg inhalation q.6 hours p.r.n.

18. Amlodipine will be discontinued.

19. Metoprolol-XL will be discontinued and we will give Coreg.



FOLLOWUP:  She will have follow up with Dr. Ruiz Benavides, Dr. Jose Alberto Gordon, 
and

Dr. Sultana Saini.  She can return to the hospital should she need to do so at 
any

time in the future. 



Time spent in discharge activities was 31 min. 



Job ID:  [id number]



MTDD

## 2020-08-10 ENCOUNTER — HOSPITAL ENCOUNTER (EMERGENCY)
Dept: HOSPITAL 9 - MADERS | Age: 55
Discharge: ANOTHER HOSPITAL | End: 2020-08-10
Payer: MEDICARE

## 2020-08-10 DIAGNOSIS — Z79.899: ICD-10-CM

## 2020-08-10 DIAGNOSIS — E66.9: ICD-10-CM

## 2020-08-10 DIAGNOSIS — I50.9: ICD-10-CM

## 2020-08-10 DIAGNOSIS — D64.9: ICD-10-CM

## 2020-08-10 DIAGNOSIS — Z85.72: ICD-10-CM

## 2020-08-10 DIAGNOSIS — Z79.84: ICD-10-CM

## 2020-08-10 DIAGNOSIS — R09.02: ICD-10-CM

## 2020-08-10 DIAGNOSIS — I11.0: Primary | ICD-10-CM

## 2020-08-10 DIAGNOSIS — J44.9: ICD-10-CM

## 2020-08-10 DIAGNOSIS — E11.9: ICD-10-CM

## 2020-08-10 LAB
ALBUMIN SERPL BCG-MCNC: 3.9 G/DL (ref 3.5–5)
ALP SERPL-CCNC: 111 U/L (ref 40–110)
ALT SERPL W P-5'-P-CCNC: 11 U/L (ref 8–55)
ANION GAP SERPL CALC-SCNC: 16 MMOL/L (ref 10–20)
ANISOCYTOSIS BLD QL SMEAR: (no result) (100X)
AST SERPL-CCNC: 9 U/L (ref 5–34)
BASOPHILS # BLD AUTO: 0.1 THOU/UL (ref 0–0.2)
BASOPHILS NFR BLD AUTO: 0.8 % (ref 0–1)
BILIRUB SERPL-MCNC: 0.5 MG/DL (ref 0.2–1.2)
BUN SERPL-MCNC: 38 MG/DL (ref 9.8–20.1)
CALCIUM SERPL-MCNC: 9.2 MG/DL (ref 7.8–10.44)
CHLORIDE SERPL-SCNC: 103 MMOL/L (ref 98–107)
CO2 SERPL-SCNC: 26 MMOL/L (ref 22–29)
CREAT CL PREDICTED SERPL C-G-VRATE: 0 ML/MIN (ref 70–130)
EOSINOPHIL # BLD AUTO: 0.1 THOU/UL (ref 0–0.7)
EOSINOPHIL NFR BLD AUTO: 0.8 % (ref 0–10)
GLOBULIN SER CALC-MCNC: 3.1 G/DL (ref 2.4–3.5)
GLUCOSE SERPL-MCNC: 227 MG/DL (ref 70–105)
HGB BLD-MCNC: 8.4 G/DL (ref 12–16)
LYMPHOCYTES # BLD AUTO: 1.1 THOU/UL (ref 1.2–3.4)
LYMPHOCYTES NFR BLD AUTO: 8.4 % (ref 21–51)
MCH RBC QN AUTO: 24 PG (ref 27–31)
MCV RBC AUTO: 82.6 FL (ref 78–98)
MONOCYTES # BLD AUTO: 0.7 THOU/UL (ref 0.11–0.59)
MONOCYTES NFR BLD AUTO: 5.5 % (ref 0–10)
NEUTROPHILS # BLD AUTO: 11.2 THOU/UL (ref 1.4–6.5)
NEUTROPHILS NFR BLD AUTO: 84.6 % (ref 42–75)
PLATELET # BLD AUTO: 328 THOU/UL (ref 130–400)
POIKILOCYTOSIS BLD QL SMEAR: (no result) (100X)
POTASSIUM SERPL-SCNC: 5.1 MMOL/L (ref 3.5–5.1)
RBC # BLD AUTO: 3.5 MILL/UL (ref 4.2–5.4)
SODIUM SERPL-SCNC: 140 MMOL/L (ref 136–145)
WBC # BLD AUTO: 13.2 THOU/UL (ref 4.8–10.8)

## 2020-08-10 PROCEDURE — 71045 X-RAY EXAM CHEST 1 VIEW: CPT

## 2020-08-10 PROCEDURE — 84484 ASSAY OF TROPONIN QUANT: CPT

## 2020-08-10 PROCEDURE — 85025 COMPLETE CBC W/AUTO DIFF WBC: CPT

## 2020-08-10 PROCEDURE — 96375 TX/PRO/DX INJ NEW DRUG ADDON: CPT

## 2020-08-10 PROCEDURE — 96365 THER/PROPH/DIAG IV INF INIT: CPT

## 2020-08-10 PROCEDURE — 80053 COMPREHEN METABOLIC PANEL: CPT

## 2020-08-10 PROCEDURE — 93005 ELECTROCARDIOGRAM TRACING: CPT

## 2020-08-10 PROCEDURE — 83880 ASSAY OF NATRIURETIC PEPTIDE: CPT

## 2020-08-10 NOTE — RAD
Chest one view



HISTORY: Dyspnea. Follow-up.



COMPARISON: 12/16/2019.



FINDINGS: Cardiac silhouette is magnified by projection. Upper limits of normal in size. Pulmonary va
sculature remains engorged.



Mediastinum is midline.



Subtle ill-defined parenchymal infiltrate projects over the anterior segment right upper lobe.



No evidence of pneumothorax.



  



IMPRESSION :

Cardiomegaly with pulmonary vascular congestion.



Probable superimposed right upper lobe infiltrate. Clinical correlation regarding other signs and sym
ptoms of right upper lobe pneumonitis is required.



Please consider short-term radiographic follow-up with upright PA and lateral views of the chest.



Reported By: CAROLINE Feldman 

Electronically Signed:  8/10/2020 7:46 AM

## 2020-11-21 ENCOUNTER — HOSPITAL ENCOUNTER (INPATIENT)
Dept: HOSPITAL 92 - ERS | Age: 55
LOS: 17 days | Discharge: NURSING, REHAB OR LONG-TERM CARE | DRG: 207 | End: 2020-12-08
Attending: INTERNAL MEDICINE | Admitting: INTERNAL MEDICINE
Payer: MEDICARE

## 2020-11-21 VITALS — BODY MASS INDEX: 47.7 KG/M2

## 2020-11-21 DIAGNOSIS — E11.22: ICD-10-CM

## 2020-11-21 DIAGNOSIS — Z79.4: ICD-10-CM

## 2020-11-21 DIAGNOSIS — E78.5: ICD-10-CM

## 2020-11-21 DIAGNOSIS — G92: ICD-10-CM

## 2020-11-21 DIAGNOSIS — E55.9: ICD-10-CM

## 2020-11-21 DIAGNOSIS — E87.5: ICD-10-CM

## 2020-11-21 DIAGNOSIS — J96.22: ICD-10-CM

## 2020-11-21 DIAGNOSIS — Z78.1: ICD-10-CM

## 2020-11-21 DIAGNOSIS — E87.0: ICD-10-CM

## 2020-11-21 DIAGNOSIS — I50.33: ICD-10-CM

## 2020-11-21 DIAGNOSIS — Z79.899: ICD-10-CM

## 2020-11-21 DIAGNOSIS — E87.3: ICD-10-CM

## 2020-11-21 DIAGNOSIS — Z98.51: ICD-10-CM

## 2020-11-21 DIAGNOSIS — Z66: ICD-10-CM

## 2020-11-21 DIAGNOSIS — Z51.5: ICD-10-CM

## 2020-11-21 DIAGNOSIS — Z20.828: ICD-10-CM

## 2020-11-21 DIAGNOSIS — Z85.72: ICD-10-CM

## 2020-11-21 DIAGNOSIS — Z92.21: ICD-10-CM

## 2020-11-21 DIAGNOSIS — D50.9: ICD-10-CM

## 2020-11-21 DIAGNOSIS — R29.713: ICD-10-CM

## 2020-11-21 DIAGNOSIS — J44.9: ICD-10-CM

## 2020-11-21 DIAGNOSIS — N18.30: ICD-10-CM

## 2020-11-21 DIAGNOSIS — N17.9: ICD-10-CM

## 2020-11-21 DIAGNOSIS — Z82.49: ICD-10-CM

## 2020-11-21 DIAGNOSIS — I13.0: ICD-10-CM

## 2020-11-21 DIAGNOSIS — Z90.49: ICD-10-CM

## 2020-11-21 DIAGNOSIS — I16.0: ICD-10-CM

## 2020-11-21 DIAGNOSIS — E83.42: ICD-10-CM

## 2020-11-21 DIAGNOSIS — E66.2: ICD-10-CM

## 2020-11-21 DIAGNOSIS — E88.09: ICD-10-CM

## 2020-11-21 DIAGNOSIS — J96.21: Primary | ICD-10-CM

## 2020-11-21 DIAGNOSIS — F41.9: ICD-10-CM

## 2020-11-21 DIAGNOSIS — E83.39: ICD-10-CM

## 2020-11-21 DIAGNOSIS — J45.21: ICD-10-CM

## 2020-11-21 DIAGNOSIS — Z83.3: ICD-10-CM

## 2020-11-21 DIAGNOSIS — I63.9: ICD-10-CM

## 2020-11-21 DIAGNOSIS — Z28.21: ICD-10-CM

## 2020-11-21 DIAGNOSIS — E87.6: ICD-10-CM

## 2020-11-21 DIAGNOSIS — Z88.0: ICD-10-CM

## 2020-11-21 DIAGNOSIS — Z99.81: ICD-10-CM

## 2020-11-21 DIAGNOSIS — T42.75XA: ICD-10-CM

## 2020-11-21 DIAGNOSIS — Z86.73: ICD-10-CM

## 2020-11-21 LAB
ALBUMIN SERPL BCG-MCNC: 3.5 G/DL (ref 3.5–5)
ALP SERPL-CCNC: 170 U/L (ref 40–110)
ALT SERPL W P-5'-P-CCNC: 14 U/L (ref 8–55)
ANALYZER IN CARDIO: (no result)
ANION GAP SERPL CALC-SCNC: 15 MMOL/L (ref 10–20)
AST SERPL-CCNC: 14 U/L (ref 5–34)
BACTERIA UR QL AUTO: (no result) HPF
BASE EXCESS STD BLDA CALC-SCNC: 1.9 MEQ/L
BASOPHILS # BLD AUTO: 0 THOU/UL (ref 0–0.2)
BASOPHILS NFR BLD AUTO: 0.2 % (ref 0–1)
BILIRUB SERPL-MCNC: 0.5 MG/DL (ref 0.2–1.2)
BUN SERPL-MCNC: 21 MG/DL (ref 9.8–20.1)
CA-I BLDA-SCNC: 1.11 MMOL/L (ref 1.12–1.3)
CALCIUM SERPL-MCNC: 8.6 MG/DL (ref 7.8–10.44)
CHLORIDE SERPL-SCNC: 103 MMOL/L (ref 98–107)
CO2 SERPL-SCNC: 27 MMOL/L (ref 22–29)
CREAT CL PREDICTED SERPL C-G-VRATE: 0 ML/MIN (ref 70–130)
EOSINOPHIL # BLD AUTO: 0.1 THOU/UL (ref 0–0.7)
EOSINOPHIL NFR BLD AUTO: 0.4 % (ref 0–10)
GLOBULIN SER CALC-MCNC: 2.9 G/DL (ref 2.4–3.5)
GLUCOSE SERPL-MCNC: 222 MG/DL (ref 70–105)
GLUCOSE UR STRIP-MCNC: 30 MG/DL
HCO3 BLDA-SCNC: 27.3 MEQ/L (ref 22–28)
HCT VFR BLDA CALC: 25 % (ref 36–47)
HGB BLD-MCNC: 8.1 G/DL (ref 12–16)
HGB BLDA-MCNC: 8.6 G/DL (ref 12–16)
LYMPHOCYTES # BLD: 0.5 THOU/UL (ref 1.2–3.4)
LYMPHOCYTES NFR BLD AUTO: 4.1 % (ref 21–51)
MCH RBC QN AUTO: 23.7 PG (ref 27–31)
MCV RBC AUTO: 79.8 FL (ref 78–98)
MONOCYTES # BLD AUTO: 0.5 THOU/UL (ref 0.11–0.59)
MONOCYTES NFR BLD AUTO: 4 % (ref 0–10)
NEUTROPHILS # BLD AUTO: 12.3 THOU/UL (ref 1.4–6.5)
NEUTROPHILS NFR BLD AUTO: 91.3 % (ref 42–75)
PCO2 BLDA: 47.4 MMHG (ref 35–45)
PH BLDA: 7.38 [PH] (ref 7.35–7.45)
PLATELET # BLD AUTO: 346 THOU/UL (ref 130–400)
PO2 BLDA: 101.2 MMHG (ref 80–100)
POTASSIUM BLD-SCNC: 4.27 MMOL/L (ref 3.7–5.3)
POTASSIUM SERPL-SCNC: 4.3 MMOL/L (ref 3.5–5.1)
PROT UR STRIP.AUTO-MCNC: 600 MG/DL
RBC # BLD AUTO: 3.43 MILL/UL (ref 4.2–5.4)
RBC UR QL AUTO: (no result) HPF (ref 0–3)
SODIUM SERPL-SCNC: 141 MMOL/L (ref 136–145)
SP GR UR STRIP: 1.01 (ref 1–1.04)
SPECIMEN DRAWN FROM PATIENT: (no result)
TROPONIN I SERPL DL<=0.01 NG/ML-MCNC: 0.04 NG/ML (ref ?–0.03)
TROPONIN I SERPL DL<=0.01 NG/ML-MCNC: 0.04 NG/ML (ref ?–0.03)
WBC # BLD AUTO: 13.4 THOU/UL (ref 4.8–10.8)
WBC UR QL AUTO: (no result) HPF (ref 0–3)

## 2020-11-21 PROCEDURE — 82533 TOTAL CORTISOL: CPT

## 2020-11-21 PROCEDURE — 82274 ASSAY TEST FOR BLOOD FECAL: CPT

## 2020-11-21 PROCEDURE — 94003 VENT MGMT INPAT SUBQ DAY: CPT

## 2020-11-21 PROCEDURE — 96360 HYDRATION IV INFUSION INIT: CPT

## 2020-11-21 PROCEDURE — 85025 COMPLETE CBC W/AUTO DIFF WBC: CPT

## 2020-11-21 PROCEDURE — 4A143B0 MONITORING OF VENOUS PRESSURE, CENTRAL, PERCUTANEOUS APPROACH: ICD-10-PCS | Performed by: INTERNAL MEDICINE

## 2020-11-21 PROCEDURE — 0T9B70Z DRAINAGE OF BLADDER WITH DRAINAGE DEVICE, VIA NATURAL OR ARTIFICIAL OPENING: ICD-10-PCS | Performed by: INTERNAL MEDICINE

## 2020-11-21 PROCEDURE — 93306 TTE W/DOPPLER COMPLETE: CPT

## 2020-11-21 PROCEDURE — 81015 MICROSCOPIC EXAM OF URINE: CPT

## 2020-11-21 PROCEDURE — 85610 PROTHROMBIN TIME: CPT

## 2020-11-21 PROCEDURE — 83540 ASSAY OF IRON: CPT

## 2020-11-21 PROCEDURE — 84100 ASSAY OF PHOSPHORUS: CPT

## 2020-11-21 PROCEDURE — 36430 TRANSFUSION BLD/BLD COMPNT: CPT

## 2020-11-21 PROCEDURE — 96375 TX/PRO/DX INJ NEW DRUG ADDON: CPT

## 2020-11-21 PROCEDURE — P9016 RBC LEUKOCYTES REDUCED: HCPCS

## 2020-11-21 PROCEDURE — 96361 HYDRATE IV INFUSION ADD-ON: CPT

## 2020-11-21 PROCEDURE — 82805 BLOOD GASES W/O2 SATURATION: CPT

## 2020-11-21 PROCEDURE — 36556 INSERT NON-TUNNEL CV CATH: CPT

## 2020-11-21 PROCEDURE — P9047 ALBUMIN (HUMAN), 25%, 50ML: HCPCS

## 2020-11-21 PROCEDURE — 96365 THER/PROPH/DIAG IV INF INIT: CPT

## 2020-11-21 PROCEDURE — 84443 ASSAY THYROID STIM HORMONE: CPT

## 2020-11-21 PROCEDURE — 80048 BASIC METABOLIC PNL TOTAL CA: CPT

## 2020-11-21 PROCEDURE — 93005 ELECTROCARDIOGRAM TRACING: CPT

## 2020-11-21 PROCEDURE — 83735 ASSAY OF MAGNESIUM: CPT

## 2020-11-21 PROCEDURE — 95819 EEG AWAKE AND ASLEEP: CPT

## 2020-11-21 PROCEDURE — 86850 RBC ANTIBODY SCREEN: CPT

## 2020-11-21 PROCEDURE — 51702 INSERT TEMP BLADDER CATH: CPT

## 2020-11-21 PROCEDURE — 95816 EEG AWAKE AND DROWSY: CPT

## 2020-11-21 PROCEDURE — 36600 WITHDRAWAL OF ARTERIAL BLOOD: CPT

## 2020-11-21 PROCEDURE — 85046 RETICYTE/HGB CONCENTRATE: CPT

## 2020-11-21 PROCEDURE — 83880 ASSAY OF NATRIURETIC PEPTIDE: CPT

## 2020-11-21 PROCEDURE — 84484 ASSAY OF TROPONIN QUANT: CPT

## 2020-11-21 PROCEDURE — 85730 THROMBOPLASTIN TIME PARTIAL: CPT

## 2020-11-21 PROCEDURE — 82140 ASSAY OF AMMONIA: CPT

## 2020-11-21 PROCEDURE — 5A1955Z RESPIRATORY VENTILATION, GREATER THAN 96 CONSECUTIVE HOURS: ICD-10-PCS | Performed by: INTERNAL MEDICINE

## 2020-11-21 PROCEDURE — 81003 URINALYSIS AUTO W/O SCOPE: CPT

## 2020-11-21 PROCEDURE — 86901 BLOOD TYPING SEROLOGIC RH(D): CPT

## 2020-11-21 PROCEDURE — 80053 COMPREHEN METABOLIC PANEL: CPT

## 2020-11-21 PROCEDURE — 36416 COLLJ CAPILLARY BLOOD SPEC: CPT

## 2020-11-21 PROCEDURE — 0BH17EZ INSERTION OF ENDOTRACHEAL AIRWAY INTO TRACHEA, VIA NATURAL OR ARTIFICIAL OPENING: ICD-10-PCS | Performed by: INTERNAL MEDICINE

## 2020-11-21 PROCEDURE — 36680 INSERT NEEDLE BONE CAVITY: CPT

## 2020-11-21 PROCEDURE — 31500 INSERT EMERGENCY AIRWAY: CPT

## 2020-11-21 PROCEDURE — 94002 VENT MGMT INPAT INIT DAY: CPT

## 2020-11-21 PROCEDURE — 82728 ASSAY OF FERRITIN: CPT

## 2020-11-21 PROCEDURE — 70450 CT HEAD/BRAIN W/O DYE: CPT

## 2020-11-21 PROCEDURE — 83550 IRON BINDING TEST: CPT

## 2020-11-21 PROCEDURE — 0XH833Z INSERTION OF INFUSION DEVICE INTO RIGHT UPPER ARM, PERCUTANEOUS APPROACH: ICD-10-PCS | Performed by: INTERNAL MEDICINE

## 2020-11-21 PROCEDURE — 94640 AIRWAY INHALATION TREATMENT: CPT

## 2020-11-21 PROCEDURE — 96367 TX/PROPH/DG ADDL SEQ IV INF: CPT

## 2020-11-21 PROCEDURE — 84439 ASSAY OF FREE THYROXINE: CPT

## 2020-11-21 PROCEDURE — 86900 BLOOD TYPING SEROLOGIC ABO: CPT

## 2020-11-21 PROCEDURE — 36415 COLL VENOUS BLD VENIPUNCTURE: CPT

## 2020-11-21 PROCEDURE — U0002 COVID-19 LAB TEST NON-CDC: HCPCS

## 2020-11-21 PROCEDURE — 02HV33Z INSERTION OF INFUSION DEVICE INTO SUPERIOR VENA CAVA, PERCUTANEOUS APPROACH: ICD-10-PCS | Performed by: INTERNAL MEDICINE

## 2020-11-21 PROCEDURE — 71045 X-RAY EXAM CHEST 1 VIEW: CPT

## 2020-11-21 PROCEDURE — 87086 URINE CULTURE/COLONY COUNT: CPT

## 2020-11-21 RX ADMIN — FENTANYL CITRATE SCH MLS: 50 INJECTION, SOLUTION INTRAMUSCULAR; INTRAVENOUS at 20:14

## 2020-11-21 NOTE — RAD
EXAM:

Chest one view:



HISTORY:

Intubation and central line placement



COMPARISON:

11/21/2020



FINDINGS:

Right central line placed with the tip extending into the distal superior vena cava. Distal NG tube i
s poorly seen on this study. Endotracheal tube remains in satisfactory location.

Heart size: Within normal limits.

Lungs: Stable extensive bilateral perihilar mostly alveolar parenchymal changes.





IMPRESSION:

Lines in place. No new process.





Reported By: Alex Phillip 

Electronically Signed:  11/21/2020 4:00 PM

## 2020-11-21 NOTE — PDOC.HHP
Hospitalist HPI





- History of Present Illness


Shortness of breath


History of Present Illness: 





Patient is 55-year-old female with chronic diastolic heart failure, asthma, 

obstructive sleep apnea and hypertension was brought in by EMS with above 

complaints.  At this time patient is intubated and sedated.  Per ER record EMS 

was called due to worsening shortness of breath.  Initially patient was 

hypotensive with blood pressure of 240/130.  Nitroglycerin patch was placed 

after which blood pressure dropped to 95/71.  Nitroglycerin patch was 

subsequently removed.  Her O2 saturation initially was 89% on room air.  She was

placed on CPAP.  Her mental status gradually worsened requiring intubation.  A 

central line was also placed at the same time.





Per daughter, Patient has been SOB with wheezing for last 3-4 days and has been 

progressively getting worse. No CP/syncope reported to family by patient.





PAST MEDICAL HISTORY: Bronchial asthma, chronic diastolic heart failure, 

obstructive sleep apneacurrently not on CPAP, ?Obesity hypoventilation, 

hypertension, diabetes mellitus type 2, morbid obesity, chronic anemia, history 

of non-Hodgkin's lymphoma completed chemotherapy, chronic hypoxic respiratory 

failure on home oxygen





PAST SURGICAL HISTORY: Cholecystectomy, tubal ligation, Port-A-Cath, lymph node 

removal





SOCIAL HISTORY: Currently lives at home close to her family.  Patient is 

disabled.  She ambulates with the help of a walker.  No tobacco, alcohol or drug

use





FAMILY HISTORY: Diabetes and hypertension runs in her family.  Breast cancer in 

her mother.  Father  in his 60s from massive MI.








ED Course: 





MEDICATION ADMINISTRATION SUMMARY 


Sat 2020 19:57 











                    Drug Name  Dose Ordered Route Status Time


 


*ketamine injection  150 mg IV Push Canceled 14:21 2020


 


*azithromycin intravenous  500 mg IV Piggy Back Given 15:51 2020


 


*Rocephin injection  2 g IV Piggy Back Given 15:35 2020


 


*Diprivan  titrate mcg/kg/min IV Fluid Infusion Given 15:02 2020


 


*sodium chloride 0.9 % intravenous  1 L IV Fluid Infusion Given 14:47 2020


 


*furosemide injection  80 mg IV Push Given 14:45 2020


 


*Nitro-Bid transdermal  1 inch Topical Given 14:44 2020


 


*vecuronium bromide  150 mg IV Push Given 14:28 2020


 


*Amidate  30 mg IV Push Given 14:27 2020














Hospitalist ROS





- Review of Systems


ROS unobtainable: due to endotracheal tube





- Medication


Medications: 


Allergies





Penicillins Allergy (Verified 20 04:57)


   Hives





                                        











 Medication  Instructions  Recorded  Confirmed  Type


 


Atorvastatin Calcium [Lipitor] 40 mg PO DAILY 11/29/19 08/10/20 History


 


Ergocalciferol (Vitamin D2) 5,000 unit PO Q7DAYS 11/29/19 08/10/20 History





[Vitamin D2]    


 


Furosemide 20 mg PO BID 11/29/19 08/10/20 History


 


Gabapentin 300 mg PO TID 11/29/19 08/10/20 History


 


Insulin Detemir [Levemir Flextouch] 75 units SC BID 11/29/19 08/10/20 History


 


Insulin Lispro [Humalog Kwikpen 15 unit SQ TID-WM 11/29/19 08/10/20 History





U-100]    


 


Pantoprazole Sodium 40 mg PO DAILY 11/29/19 08/10/20 History


 


Potassium Chloride 10 meq PO DAILY 11/29/19 08/10/20 History


 


Albuterol Sulfate [Proair 90 mcg IH DAILY PRN 12/17/19 08/10/20 History





Respiclick]    


 


Venlafaxine HCl [Effexor] 75 mg PO DAILY 12/17/19 08/10/20 History


 


Ferrous Sulfate [Feosol] 325 mg PO QA-WM #30 tab 12/19/19 08/10/20 Rx


 


Ipratropium/Albuterol Sulfate 3 ml NEB QID PRN #30 neb 12/19/19 08/10/20 Rx





[DuoNeb]    


 


Lisinopril 20 mg PO DAILY 08/10/20 08/10/20 History


 


Metoprolol Succinate 100 mg PO BID 08/10/20 08/10/20 History


 


metFORMIN HCl [Metformin HCl] 1,000 mg PO DAILY 08/10/20 08/10/20 History


 


NIFEdipine [Procardia XL] 60 mg PO DAILY #30 tab 20  Rx

















- Exam


General Appearance: ill appearing


Eye: PERRL, anicteric sclera


ENT: normocephalic atraumatic, no oropharyngeal lesions


Neck: supple, symmetric


Neck - other findings: JVD cannot be appreciated due to body habitus


Heart: RRR, no gallops, no rubs, normal peripheral pulses


Respiratory: no wheezes, rales, rhonchi, tachypneic


Respiratory - other findings: Intubated


Gastrointestinal: soft, normal bowel sounds, no guarding, no rigidity


Gastrointestinal - other findings: Obese


Extremities: no cyanosis, 2+ LE edema


Skin: normal turgor


Neurological - other findings: Neuro/psych exam limited due to current mentation





Hospitalist Results





- Labs


Result Diagrams: 


                                 20 15:06





                                 20 15:06


Lab results: 


                                        











WBC  13.4 thou/uL (4.8-10.8)  H  20  15:06    


 


Hgb  8.1 g/dL (12.0-16.0)  L  20  15:06    


 


Hct  27.4 % (36.0-47.0)  L  20  15:06    


 


MCV  79.8 fL (78.0-98.0)   20  15:06    


 


Plt Count  346 thou/uL (130-400)   20  15:06    


 


Neutrophils %  91.3 % (42.0-75.0)  H  20  15:06    


 


ABG pH  7.38  (7.35-7.45)   20  14:56    


 


ABG pCO2  47.4 mmHg (35.0-45.0)  H  20  14:56    


 


ABG pO2  101.2 mmHg (80.0-100.0)  H  20  14:56    


 


Sodium  141 mmol/L (136-145)   20  15:06    


 


Potassium  4.3 mmol/L (3.5-5.1)   20  15:06    


 


Chloride  103 mmol/L ()   20  15:06    


 


Carbon Dioxide  27 mmol/L (22-29)   20  15:06    


 


BUN  21 mg/dL (9.8-20.1)  H  20  15:06    


 


Creatinine  1.44 mg/dL (0.6-1.1)  H  20  15:06    


 


Glucose  222 mg/dL ()  H  20  15:06    


 


Calcium  8.6 mg/dL (7.8-10.44)   20  15:06    


 


Total Bilirubin  0.5 mg/dL (0.2-1.2)   20  15:06    


 


AST  14 U/L (5-34)   20  15:06    


 


ALT  14 U/L (8-55)   20  15:06    


 


Alkaline Phosphatase  170 U/L ()  H  20  15:06    


 


Troponin I  0.042 ng/mL (< 0.028)  H  20  17:50    


 


B-Natriuretic Peptide  348.2 pg/mL (0-100)  H  20  15:06    


 


Serum Total Protein  6.4 g/dL (6.0-8.3)   20  15:06    


 


Albumin  3.5 g/dL (3.5-5.0)   20  15:06    


 


Urine Ketones  Negative mg/dL (Negative)   20  14:54    


 


Urine Blood  2+  (Negative)  A  20  14:54    


 


Urine Nitrite  Negative  (Negative)   20  14:54    


 


Ur Leukocyte Esterase  Negative Mary Ann/uL (Negative)   20  14:54    


 


Urine RBC  4-6 HPF (0-3)  A  20  14:54    


 


Urine WBC  4-6 HPF (0-3)  A  20  14:54    


 


Ur Squamous Epith Cells  0-3 HPF (0-3)   20  14:54    


 


Urine Bacteria  3+ HPF (None Seen)  A  20  14:54    








                                        











ABG pH  7.38  (7.35-7.45)   20  14:56    


 


ABG pCO2  47.4 mmHg (35.0-45.0)  H  20  14:56    


 


ABG Base Excess  1.9 mEq/L (-2.0 to +3.0)   20  14:56    














- EKG Interpretation


EKG: 





Sinus rhythmreviewed by me





- Radiology Interpretation


  ** Chest x-ray


Status: image reviewed by me


Additional Comment: 





Evidence for bilateral perihilar edema and possible small pleural effusions 





Hospitalist H&P A/P





- Plan


Plan: 





Acute hypoxic and hypercapnic respiratory failure 


Acute on chronic diastolic heart failure exacerbation


Acute asthma exacerbation


Hypertension with hypertensive urgency on admission


Diabetes mellitus type 2


Obstructive sleep apnea


Morbid obesity with a BMI 51.2


Chronic anemia with hemoglobin 8.1


CKD stage III


Suspected UTI


Hyperlipidemia


Anxiety





Plan:


Patient will be monitored in the intensive care unit.  We will continue 

mechanical ventilation.  Consult pulmonary critical care.  IV diuretics.  

Ventilation sedation protocol.  Empiric antibiotics.  Nitroglycerin patch as 

needed restart home medications once verified.  Insulin sliding scale.  Urine 

cultures have been sent.  Recheck ABGs and chest x-ray in a.m.  Verify home 

medications.  Full code for now.  Will verify the CODE STATUS with the family.  

Daughter is the DPOA based on previous records.  DVT and GI prophylaxis.

## 2020-11-21 NOTE — RAD
EXAM:

Chest one view:



HISTORY:

Congestive heart failure exacerbation, altered mental status



COMPARISON:

8/10/2020



FINDINGS:

Endotracheal tube and NG tube in place.

Heart size: Within normal limits.

Lungs: Evidence for bilateral perihilar alveolar edema.

Slight blunting of the costophrenic angles.

No evidence for confluent lobar pneumonia, significant pleural effusion, acute edema, or pneumothorax
, or other significant acute process.



IMPRESSION:

Evidence for bilateral perihilar edema and possible small pleural effusions

Continued short-term follow-up.



Reported By: Alex Phillip 

Electronically Signed:  11/21/2020 3:02 PM

## 2020-11-22 LAB
ANALYZER IN CARDIO: (no result)
ANION GAP SERPL CALC-SCNC: 18 MMOL/L (ref 10–20)
APTT PPP: 37.3 SEC (ref 22.9–36.1)
BASE EXCESS STD BLDA CALC-SCNC: 3.5 MEQ/L
BASOPHILS # BLD AUTO: 0 THOU/UL (ref 0–0.2)
BASOPHILS NFR BLD AUTO: 0.4 % (ref 0–1)
BUN SERPL-MCNC: 26 MG/DL (ref 9.8–20.1)
CA-I BLDA-SCNC: 1.05 MMOL/L (ref 1.12–1.3)
CALCIUM SERPL-MCNC: 8.6 MG/DL (ref 7.8–10.44)
CHLORIDE SERPL-SCNC: 100 MMOL/L (ref 98–107)
CO2 SERPL-SCNC: 26 MMOL/L (ref 22–29)
CREAT CL PREDICTED SERPL C-G-VRATE: 103 ML/MIN (ref 70–130)
EOSINOPHIL # BLD AUTO: 0 THOU/UL (ref 0–0.7)
EOSINOPHIL NFR BLD AUTO: 0.1 % (ref 0–10)
GLUCOSE SERPL-MCNC: 252 MG/DL (ref 70–105)
HCO3 BLDA-SCNC: 25.6 MEQ/L (ref 22–28)
HCT VFR BLDA CALC: 23 % (ref 36–47)
HGB BLD-MCNC: 7 G/DL (ref 12–16)
HGB BLDA-MCNC: 7.9 G/DL (ref 12–16)
INR PPP: 1.2
IRON SERPL-MCNC: 11 UG/DL (ref 50–170)
LYMPHOCYTES # BLD: 0.5 THOU/UL (ref 1.2–3.4)
LYMPHOCYTES NFR BLD AUTO: 4.2 % (ref 21–51)
MAGNESIUM SERPL-MCNC: 1.1 MG/DL (ref 1.6–2.6)
MCH RBC QN AUTO: 23.7 PG (ref 27–31)
MCV RBC AUTO: 76.9 FL (ref 78–98)
MONOCYTES # BLD AUTO: 0.3 THOU/UL (ref 0.11–0.59)
MONOCYTES NFR BLD AUTO: 2.2 % (ref 0–10)
NEUTROPHILS # BLD AUTO: 10.4 THOU/UL (ref 1.4–6.5)
NEUTROPHILS NFR BLD AUTO: 93.1 % (ref 42–75)
PCO2 BLDA: 28.8 MMHG (ref 35–45)
PH BLDA: 7.57 [PH] (ref 7.35–7.45)
PLATELET # BLD AUTO: 341 THOU/UL (ref 130–400)
PO2 BLDA: 59.9 MMHG (ref 80–100)
POTASSIUM BLD-SCNC: 3.2 MMOL/L (ref 3.7–5.3)
POTASSIUM SERPL-SCNC: 3.4 MMOL/L (ref 3.5–5.1)
PROTHROMBIN TIME: 15.1 SEC (ref 12–14.7)
RBC # BLD AUTO: 2.96 MILL/UL (ref 4.2–5.4)
RETICS/RBC NFR: 1.5 % (ref 0.5–1.5)
SODIUM SERPL-SCNC: 141 MMOL/L (ref 136–145)
SPECIMEN DRAWN FROM PATIENT: (no result)
TROPONIN I SERPL DL<=0.01 NG/ML-MCNC: 0.04 NG/ML (ref ?–0.03)
UIBC SERPL-MCNC: 240 MCG/DL (ref 265–497)
WBC # BLD AUTO: 11.2 THOU/UL (ref 4.8–10.8)

## 2020-11-22 RX ADMIN — INSULIN HUMAN PRN UNIT: 100 INJECTION, SOLUTION PARENTERAL at 17:49

## 2020-11-22 RX ADMIN — INSULIN HUMAN PRN UNIT: 100 INJECTION, SOLUTION PARENTERAL at 14:11

## 2020-11-22 RX ADMIN — NITROGLYCERIN SCH INCH: 20 OINTMENT TOPICAL at 05:53

## 2020-11-22 RX ADMIN — ASPIRIN 81 MG CHEWABLE TABLET SCH MG: 81 TABLET CHEWABLE at 08:54

## 2020-11-22 RX ADMIN — FENTANYL CITRATE SCH MLS: 50 INJECTION, SOLUTION INTRAMUSCULAR; INTRAVENOUS at 23:35

## 2020-11-22 RX ADMIN — INSULIN HUMAN PRN UNIT: 100 INJECTION, SOLUTION PARENTERAL at 09:46

## 2020-11-22 RX ADMIN — CEFTRIAXONE SCH MLS: 1 INJECTION, POWDER, FOR SOLUTION INTRAMUSCULAR; INTRAVENOUS at 14:34

## 2020-11-22 RX ADMIN — ALBUMIN HUMAN SCH GM: 250 SOLUTION INTRAVENOUS at 22:55

## 2020-11-22 RX ADMIN — AZITHROMYCIN SCH MLS: 500 INJECTION, POWDER, LYOPHILIZED, FOR SOLUTION INTRAVENOUS at 16:57

## 2020-11-22 RX ADMIN — INSULIN GLARGINE SCH MLS: 100 INJECTION, SOLUTION SUBCUTANEOUS at 20:29

## 2020-11-22 RX ADMIN — INSULIN HUMAN PRN UNIT: 100 INJECTION, SOLUTION PARENTERAL at 06:04

## 2020-11-22 RX ADMIN — ALBUMIN HUMAN SCH GM: 250 SOLUTION INTRAVENOUS at 14:38

## 2020-11-22 RX ADMIN — NITROGLYCERIN SCH INCH: 20 OINTMENT TOPICAL at 14:38

## 2020-11-22 NOTE — RAD
Chest one view



HISTORY: Respiratory failure. Follow-up.



COMPARISON: 11/21/2020.



FINDINGS: Cardiac silhouette is magnified and upper limits of normal in size. Margins partially obscu
red by ill-defined patchy infiltrates throughout each lung, including the lung bases. Upper lobe

infiltrates have progressed slightly.



Mediastinum is midline. Lines and tubes unchanged in position.



Pulmonary vasculature markedly engorged.



No evidence of pneumothorax.



  



IMPRESSION :

Interval worsening of upper lobe infiltrates. Bibasilar infiltrates, pulmonary vascular congestion, a
nd other findings are otherwise stable.



Reported By: CAROLINE Feldman 

Electronically Signed:  11/22/2020 8:39 AM

## 2020-11-22 NOTE — PRG
DATE OF SERVICE:  11/22/2020



Morbidly obese female, remains intubated in the vent, sedated.



OBJECTIVE:  VITAL SIGNS:  Temperature is 98, pulse 81, blood pressure is 140/80,

sats  __________%, respiratory rate 18. 

CHEST: No wheezing, no crackles. 

CARDIAC: Normal S1, S2. 

ABDOMEN:  No masses.



LABORATORY STUDIES:  pO2 is 59, pCO2 of 28, pH 7.57, rate of 20, 40%, PEEP of 5.

Creatinine is 1.6, it has gone up. Sodium 141, potassium 3.4  __________ 



ASSESSMENT:  Respiratory failure, morbid obesity, diastolic dysfunction, mildly

elevated BNP. 



PLAN:  She is getting too much diuretics.  She has mainly diastolic dysfunction.

Restart home medication including Cardizem.  Continue steroids, neb treatment,

antibiotics. 



We will notify Dr. Barajas in the morning. 



One-half hour of critical time.







Job ID:  [id number]

## 2020-11-22 NOTE — CON
DATE OF CONSULTATION:  11/22/2020



REASON FOR CONSULTATION:  Respiratory failure, history of COPD, and diastolic heart

failure. 



PRIMARY CARDIOLOGIST:  Dr. Jaswinder Luna.



HISTORY OF PRESENT ILLNESS:  Ms. Hart is a 55-year-old woman with the above listed

problems as well as morbid obesity.  The patient came in with the difficulty

breathing and progressive difficulty and required intubation.  The patient's family

member says that Ms. Hart gets worse when the "weather changes." 



REVIEW OF SYSTEMS:  Currently not available as she is intubated and on ventilator.



PHYSICAL EXAMINATION:

VITAL SIGNS:  Her blood pressure 150/70, pulse 80 and it is sinus. 

LUNGS:  Clear anteriorly, laterally. 

CARDIAC:  Normal S1, normal S2. 

ABDOMEN:  Obese, nontender. 

EXTREMITIES:  Warm, dry.  No clubbing or cyanosis.  There is mild edema.



PERTINENT LABORATORY DATA:  Her creatinine went from 1.44 to 1.60, which is really

near her baseline.  Potassium 3.4.  Troponin 0.037.  BNP is 348. 



Chest x-ray reveals mildly enlarged pulmonary vasculature, probably some congestive

heart failure.  Her BNP was 348. 



ASSESSMENT:  

1. Morbid obesity, BMI is over 50.

2. History of diastolic congestive heart failure.

3. History of hypertension.

4. Renal insufficiency.



PLAN:  

1. We will reduce metoprolol dose.  Patients with diastolic heart failure frequently

do 

better with somewhat more rapid heart rate.

2. Give an extra dose of Lasix tonight.  We will follow with you.







Job ID:  [id number]

## 2020-11-22 NOTE — CON
DATE OF CONSULTATION:  11/21/2020



HISTORY OF PRESENT ILLNESS:  Tabby Hart is a morbidly obese 

female, 162 pounds, recently discharged from the hospital with COPD exacerbation,

asthma, CHF, comes into the hospital with a pulse of 89, shortness of breath,

worsening mentation, on CPAP.  She was intubated after she got worse. 



Now in the ICU.



PAST MEDICAL HISTORY:  Additional past medical history is well outlined.

1. Obesity hypoventilation syndrome.

2. Anemia.

3. Hypertension.

4. Non-Hodgkin lymphoma.

5. COPD.

6. Diabetes.

7. Obesity.

8. Probably diastolic dysfunction.



PREVIOUS SURGERIES:  In 2013, lymph node from the groin, lymphoma, unclear whom she

is seeing, previous gallbladder surgery, tubal ligation. 



SOCIAL HISTORY:  No alcohol or tobacco abuse.



ALLERGIES:  PENICILLIN.



CHRONIC MEDICATIONS:  

1. Metformin.

2. Effexor.

3. Potassium.

4. Procardia 60.

5. Metoprolol 100.

6. __________.

7. Neb treatment.

8. Insulin.

9. Gabapentin.

10. Lasix.



ALLERGIES:  PENICILLIN.



REVIEW OF SYSTEMS:  Unremarkable.



PHYSICAL EXAMINATION:

VITAL SIGNS:  Pulse 72, blood pressure 160/100, sats 97%, respirations 20. 

CHEST:  Decreased breath sounds.  No wheezing.  No crackles. 

CARDIAC: Normal S1, S2.  No gallops. 

ABDOMEN:  No masses.



DIAGNOSTIC DATA:  X-ray shows questionable infiltrate, cardiomegaly.  PO2 of 101,

pCO2 or 47, pH 7.38, rate of 28, 80%, PEEP of 5, pressure support 10.  White count

13,000, H and H of 8 and 27, platelet count is normal. 



Lytes are normal.  Creatinine 1.4. 



Urine infection. Coronavirus serology done, negative.



ASSESSMENT:  Morbid obesity, respiratory failure, diastolic dysfunction, chronic

asthma, probable sleep apnea, azotemia. 



Steroids, neb treatment, broad-spectrum antibiotics. 



DVT prophylaxis and proton pump inhibitors.   We will wean as tolerated. 



___________ over the next 24 to 48 hours when she stabilizes.  We will notify Dr. Barajas. 



TIME SPENT:  45 minutes of critical care time.







Job ID:  [id number]

## 2020-11-22 NOTE — PDOC.HOSPP
- Subjective


Encounter Date: 11/22/20


Encounter Time: 12:30


Subjective: 


Patient seen and examined for respiratory failure.  Remains on mechanical 

ventilation.  Tube feeding started.  Poor urine output per RN.  No other 

overnight events.





- Objective


Vital Signs & Weight: 


                             Vital Signs (12 hours)











  Temp Pulse Resp BP Pulse Ox


 


 11/22/20 16:00    10 L  


 


 11/22/20 15:07   77   148/69 H 


 


 11/22/20 15:00  97.6 F    


 


 11/22/20 14:00    10 L  


 


 11/22/20 13:20   70   


 


 11/22/20 12:00    10 L  


 


 11/22/20 11:00  98.4 F    


 


 11/22/20 10:40   81   140/65 


 


 11/22/20 10:00    12  


 


 11/22/20 08:00    12   95


 


 11/22/20 07:30   81   177/84 H 


 


 11/22/20 07:17   81   177/84 H 


 


 11/22/20 07:00  98.2 F    


 


 11/22/20 06:00    20  








                                     Weight











Weight                         355 lb 6.162 oz











                            Most Recent Monitor Data











Heart Rate from ECG            74


 


NIBP                           135/65


 


NIBP BP-Mean                   88


 


Respiration from ECG           10


 


SpO2                           95














I&O: 


                                        











 11/21/20 11/22/20 11/23/20





 06:59 06:59 06:59


 


Intake Total  2098 195


 


Output Total  1620 552


 


Balance  478 -357











Result Diagrams: 


                                 11/22/20 11:02





                                 11/22/20 04:05


Additional Labs: 


                                   Accuchecks











  11/22/20 11/22/20 11/22/20





  12:38 09:39 00:10


 


POC Glucose  227 H  262 H  192 H














  11/21/20





  21:55


 


POC Glucose  146 H








                                        





Abnormal Lab Results - Last 48 hrs





11/21/20 14:54: Urine Clarity Turbid A, Urine Protein 600 A, Urine Blood 2+ A, 

Urine RBC 4-6 A, Urine WBC 4-6 A, Urine Bacteria 3+ A


11/21/20 14:56: ABG pCO2 47.4 H, ABG pO2 101.2 H, ABG O2 Content 11.8 L, ABG 

Hematocrit 25.0 L, ABG Hemoglobin 8.6 L, A-a O2 Gradient 409.950 H, Ionized 

Calcium 1.11 L


11/21/20 15:06: B-Natriuretic Peptide 348.2 H


11/21/20 15:06: WBC 13.4 H, RBC 3.43 L, Hgb 8.1 L, Hct 27.4 L, MCH 23.7 L, MCHC 

29.6 L, RDW 17.3 H, Neutrophils % 91.3 H, Lymphocytes % 4.1 L, Neutrophils # 

12.3 H, Lymphocytes # 0.5 L


11/21/20 15:06: BUN 21 H, Creatinine 1.44 H, Alkaline Phosphatase 170 H


11/21/20 17:50: Troponin I 0.042 H


11/21/20 22:06: Troponin I 0.037 H


11/22/20 04:05: Potassium 3.4 L, BUN 26 H, Creatinine 1.60 H, Magnesium 1.1 L, 

Iron 11 L, TIBC 240 L


11/22/20 04:05: Troponin I 0.037 H


11/22/20 04:05: PT 15.1 H, APTT 37.3 H


11/22/20 07:18: ABG pH 7.57 H*, ABG pCO2 28.8 L, ABG pO2 59.9 L*, ABG O2 Sat 

(Measured) 92.8 L, ABG O2 Content 10.3 L, ABG Base Excess 3.5 H, ABG Hematocrit 

23.0 L, ABG Hemoglobin 7.9 L, ABG Oxyhemoglobin 92.2 L, ABG Deoxyhemoglobin 7.2 

H, A-a O2 Gradient 189.300 H, Ionized Calcium 1.05 L, Potassium 3.20 L


11/22/20 11:02: WBC 11.2 H, RBC 2.96 L, Hgb 7.0 L, Hct 22.7 L, MCV 76.9 L, MCH 

23.7 L, MCHC 30.8 L, RDW 18.2 H, Neutrophils % 93.1 H, Lymphocytes % 4.2 L, 

Neutrophils # 10.4 H, Lymphocytes # 0.5 L





Microbiology - Entire Visit





11/21/20 14:54   Urine malagon catheter   Urine Culture - Preliminary


                            NO GROWTH AT 24 HOURS








Radiology Reviewed by me: Yes (Chest x-raypulmonary vascular congestion)


EKG Reviewed by me: Yes (Sinus rhythm on telemetry)





Hospitalist ROS





- Review of Systems


ROS unobtainable: due to mental status





- Medication


Medications: 


Active Medications











Generic Name Dose Route Start Last Admin





  Trade Name Freq  PRN Reason Stop Dose Admin


 


Albumin Human  25 gm  11/22/20 14:00  11/22/20 14:38





  Albumin 25% 25 Gm/100 Ml Bot  IVPB  11/23/20 14:01  25 gm





  Q8HR SANDEE   Administration


 


Albuterol/Ipratropium  3 ml  11/21/20 19:00  11/22/20 13:20





  Ipratropium/Albuterol Sulfate 3 Ml Neb  NEB   3 ml





  [id number] SANDEE   Administration


 


Aspirin  81 mg  11/22/20 09:00  11/22/20 08:54





  Aspirin Chewable 81 Mg Tab  PO   81 mg





  DAILY SANDEE   Administration


 


Diltiazem HCl  30 mg  11/22/20 15:00  11/22/20 14:34





  Diltiazem Hcl 30 Mg Tablet  PO   30 mg





  TID SANDEE   Administration


 


Enoxaparin Sodium  40 mg  11/21/20 21:00  11/21/20 20:24





  Enoxaparin Sodium 40 Mg/0.4 Ml Syringe  SC   40 mg





  2100 SANDEE   Administration


 


Hydralazine HCl  10 mg  11/21/20 20:06  11/22/20 07:30





  Hydralazine 20 Mg/Ml Vial  SLOW IVP   10 mg





  Q4H PRN   Administration





  SBP Greater Than 180  


 


Sodium Chloride  1,000 mls @ 30 mls/hr  11/21/20 19:15  11/21/20 19:42





  Normal Saline 0.9%  IV   1,000 mls





  .Q24H SANDEE   Administration


 


Ceftriaxone Sodium 1 gm/  100 mls @ 200 mls/hr  11/22/20 15:00  11/22/20 14:34





  Sodium Chloride  IVPB   100 mls





  1500 SANDEE   Administration


 


Fentanyl Citrate 2,000 mcg/  100 mls @ 0 mls/hr  11/21/20 19:00  11/21/20 20:14





  Sodium Chloride  IV  12/21/20 19:00  100 mls





  INF SANDEE   Administration





  Protocol  





  Per Protocol  


 


Insulin Human Regular  0 units  11/21/20 19:07  11/22/20 14:11





  Insulin Regular 300 Units/3 Ml Vial  SC   6 unit





  .AGGRESSIVE SLIDING  PRN   Administration





  Aggressive Correctional Scale  


 


Labetalol HCl  10 mg  11/21/20 20:15  11/22/20 04:42





  Labetalol Hcl 100 Mg/20 Ml Vial  SLOW IVP   10 mg





  Q4H PRN   Administration





  Systolic BP > 180  


 


Lisinopril  20 mg  11/21/20 21:00  11/21/20 20:39





  Lisinopril 20 Mg Tab  PO   20 mg





  HS SANDEE   Administration


 


Lorazepam  2 mg  11/21/20 19:00  11/21/20 23:08





  Lorazepam 2 Mg/Ml Vial  SLOW IVP  12/21/20 19:00  2 mg





  Q1H PRN   Administration





  Breakthrough agitation  


 


Methylprednisolone Sodium Succinate  40 mg  11/21/20 21:00  11/22/20 14:37





  Methylprednisolone Sod Succ 40 Mg Vial  IVP   40 mg





  0300,0900,1500,2100 SANDEE   Administration


 


Nitroglycerin  1 inch  11/22/20 06:00  11/22/20 14:38





  Nitroglycerin 2% Ointment 1 Inch/1 Gm Packet  TOP   1 inch





  Q8HR SANDEE   Administration


 


Pantoprazole Sodium  40 mg  11/22/20 09:00  11/22/20 08:54





  Pantoprazole 40 Mg Tab  PO   40 mg





  DAILY SANDEE   Administration


 


Propofol  1,000 mg  11/21/20 19:00  11/22/20 14:29





  Propofol 1,000 Mg/100 Ml Vial  IV  12/21/20 19:00  1,000 mg





  INF PRN   Administration





  TO ACHIEVE GOAL RASS  





  Protocol  














- Exam


General Appearance: ill appearing


General - other findings: On mechanical ventilation


Neck: supple


Heart: RRR, no gallops, no rubs, normal peripheral pulses


Respiratory: no wheezes, normal chest expansion, rales, rhonchi


Respiratory - other findings: Intubated


Gastrointestinal: soft, normal bowel sounds, no guarding, no rigidity


Extremities: no cyanosis, no clubbing, 2+ LE edema


Skin: normal turgor, no lesions


Neurological - other findings: Neuro/psych exam limited due to current mentation





Hosp A/P





- Plan


DVT proph w/SCDs





Acute hypoxic and hypercapnic respiratory failure 


Acute on chronic diastolic heart failure exacerbation


Acute asthma exacerbation


Hypertension with hypertensive urgency on admission


Diabetes mellitus type 2


Hypomagnesemia/hypokalemia


Obstructive sleep apnea


Morbid obesity with a BMI 51.2


Chronic anemia with hemoglobin 8.1 due to iron deficiency


CKD stage III


Suspected UTI


Hyperlipidemia


Anxiety


Penicillin allergy





Plan:


Continue supportive care.  Diuretics discontinued this morning.  Await 

cardiology input.  Continue nebulizer treatment.  Add IV albumin.  Reticulocyte 

1.5.  Replace magnesium.  1 dose of iron infusion.  Continue tube feeding.  Add 

Lantus at low-dose and gradually increase based on the blood sugars.  

Discontinue Nitropatch.  Recheck labs in a.m. Continue empiric antibiotics.  

Continue ventilation sedation protocol.  Continue current dose of Toprol-XL

## 2020-11-23 LAB
ANALYZER IN CARDIO: (no result)
ANION GAP SERPL CALC-SCNC: 16 MMOL/L (ref 10–20)
BASE EXCESS STD BLDA CALC-SCNC: 4 MEQ/L
BASOPHILS # BLD AUTO: 0 THOU/UL (ref 0–0.2)
BASOPHILS NFR BLD AUTO: 0 % (ref 0–1)
BUN SERPL-MCNC: 33 MG/DL (ref 9.8–20.1)
CA-I BLDA-SCNC: 1.12 MMOL/L (ref 1.12–1.3)
CALCIUM SERPL-MCNC: 8.6 MG/DL (ref 7.8–10.44)
CHLORIDE SERPL-SCNC: 99 MMOL/L (ref 98–107)
CO2 SERPL-SCNC: 30 MMOL/L (ref 22–29)
CREAT CL PREDICTED SERPL C-G-VRATE: 72 ML/MIN (ref 70–130)
EOSINOPHIL # BLD AUTO: 0 THOU/UL (ref 0–0.7)
EOSINOPHIL NFR BLD AUTO: 0.2 % (ref 0–10)
GLUCOSE SERPL-MCNC: 258 MG/DL (ref 70–105)
HCO3 BLDA-SCNC: 30.8 MEQ/L (ref 22–28)
HCT VFR BLDA CALC: 24 % (ref 36–47)
HGB BLD-MCNC: 6.8 G/DL (ref 12–16)
HGB BLDA-MCNC: 8.3 G/DL (ref 12–16)
LYMPHOCYTES # BLD: 0.6 THOU/UL (ref 1.2–3.4)
LYMPHOCYTES NFR BLD AUTO: 5.3 % (ref 21–51)
MAGNESIUM SERPL-MCNC: 2 MG/DL (ref 1.6–2.6)
MCH RBC QN AUTO: 23.6 PG (ref 27–31)
MCV RBC AUTO: 78.2 FL (ref 78–98)
MONOCYTES # BLD AUTO: 0.3 THOU/UL (ref 0.11–0.59)
MONOCYTES NFR BLD AUTO: 2.6 % (ref 0–10)
NEUTROPHILS # BLD AUTO: 9.5 THOU/UL (ref 1.4–6.5)
NEUTROPHILS NFR BLD AUTO: 91.9 % (ref 42–75)
PCO2 BLDA: 60.8 MMHG (ref 35–45)
PH BLDA: 7.32 [PH] (ref 7.35–7.45)
PLATELET # BLD AUTO: 318 THOU/UL (ref 130–400)
PO2 BLDA: 70.7 MMHG (ref 80–100)
POTASSIUM BLD-SCNC: 3.77 MMOL/L (ref 3.7–5.3)
POTASSIUM SERPL-SCNC: 3.5 MMOL/L (ref 3.5–5.1)
RBC # BLD AUTO: 2.87 MILL/UL (ref 4.2–5.4)
SODIUM SERPL-SCNC: 141 MMOL/L (ref 136–145)
SPECIMEN DRAWN FROM PATIENT: (no result)
WBC # BLD AUTO: 10.4 THOU/UL (ref 4.8–10.8)

## 2020-11-23 RX ADMIN — INSULIN HUMAN PRN UNIT: 100 INJECTION, SOLUTION PARENTERAL at 00:44

## 2020-11-23 RX ADMIN — ALBUMIN HUMAN SCH GM: 250 SOLUTION INTRAVENOUS at 05:34

## 2020-11-23 RX ADMIN — CYANOCOBALAMIN TAB 1000 MCG SCH MCG: 1000 TAB at 09:09

## 2020-11-23 RX ADMIN — ASPIRIN 81 MG CHEWABLE TABLET SCH MG: 81 TABLET CHEWABLE at 08:14

## 2020-11-23 RX ADMIN — INSULIN HUMAN PRN UNIT: 100 INJECTION, SOLUTION PARENTERAL at 09:35

## 2020-11-23 RX ADMIN — AZITHROMYCIN SCH MLS: 500 INJECTION, POWDER, LYOPHILIZED, FOR SOLUTION INTRAVENOUS at 14:14

## 2020-11-23 RX ADMIN — THERA TABS SCH TAB: TAB at 08:15

## 2020-11-23 RX ADMIN — FENTANYL CITRATE SCH MLS: 50 INJECTION, SOLUTION INTRAMUSCULAR; INTRAVENOUS at 15:45

## 2020-11-23 RX ADMIN — INSULIN HUMAN PRN UNIT: 100 INJECTION, SOLUTION PARENTERAL at 12:59

## 2020-11-23 RX ADMIN — INSULIN GLARGINE SCH: 100 INJECTION, SOLUTION SUBCUTANEOUS at 23:24

## 2020-11-23 RX ADMIN — INSULIN GLARGINE SCH MLS: 100 INJECTION, SOLUTION SUBCUTANEOUS at 11:39

## 2020-11-23 RX ADMIN — CEFTRIAXONE SCH MLS: 1 INJECTION, POWDER, FOR SOLUTION INTRAMUSCULAR; INTRAVENOUS at 15:26

## 2020-11-23 NOTE — PRG
DATE OF SERVICE:  11/23/2020



SUBJECTIVE:  Ms. Hart is currently intubated and sedated.



OBJECTIVE:  VITAL SIGNS:  Blood pressure 180/90, pulse 80, respirations 20. 

LUNGS:  Clear to auscultation. 

HEART:  Regular rate and rhythm. 

ABDOMEN:  Soft, nontender, and nondistended. 

EXTREMITIES:  2+ pitting edema.



PERTINENT LABORATORY DATA:  Hemoglobin 6.8.  Creatinine 2.27.



IMPRESSION:  

1. Diastolic dysfunction.

2. Malignant hypertension.

3. Chronic kidney disease.

4. Morbid obesity.

5. Respiratory failure.



RECOMMENDATIONS:  

1. Continue pulmonary support.

2. On p.o. Cardizem and we will add IV Cardizem.  Continue ICU support.







Job ID:  [id number]

## 2020-11-23 NOTE — RAD
PORTABLE CHEST:

 

INDICATION: 

CCU followup on ventilator.  

 

COMPARISON: 

11/22/2020.

 

FINDINGS/IMPRESSION: 

ET tube, NG tube, and central line are unchanged.  Bilateral effusions.  Linear opacity in the right 
upper lung is stable possibly representing subsegmental atelectasis.  Hazy perihilar infiltrates appe
ar unchanged.

 

POS: AGW

## 2020-11-23 NOTE — PRG
DATE OF SERVICE:  11/23/2020



A 35 minutes of critical care time.



SUBJECTIVE:  This patient is intubated on mechanical ventilation.  She will wake up.



OBJECTIVE:  VITAL SIGNS:  Temperature 98.4, pulse 81, blood pressure 173/81, and O2

saturation 100%.  24-hour intake 3034 and output 1424.  It looks like she has been

in positive fluid balance since admission and her weight is up about 6 pounds. 

HEENT:  Remarkable for a 7.5 endotracheal tube in the mouth. 

NECK:  No adenopathy or JVD. 

LUNGS:  Diminished breath sounds bilaterally. 

CARDIOVASCULAR:  S1 and S2.  Regular. 

ABDOMEN:  Obese, soft, and nontender. 

EXTREMITIES:  No clubbing or cyanosis.  Has trace edema throughout.



LABORATORY DATA:  White blood cell count 10.4, hematocrit 22.5, and platelet count

318.  ABG pending.  Sodium 141, potassium 3.5, chloride 99, CO2 of 30, BUN 33,

creatinine 2.2, and glucose 258. 



ASSESSMENT:  

1. Acute respiratory failure requiring mechanical ventilation.

2. Diastolic heart dysfunction.

3. Profound volume overload.

4. Renal insufficiency.



PLAN:  

1. I would actually like to see her diuresis some even though that will affect her

renal function somewhat.  Continue antihypertensive control. 

2. Try to reduce sedation in hopes of extubating her soon.







Job ID:  [id number]

## 2020-11-23 NOTE — PDOC.HOSPP
- Subjective


Encounter Date: 11/23/20


Encounter Time: 11:51


Subjective: 


intubated on vent.sedated





- Objective


Vital Signs & Weight: 


                             Vital Signs (12 hours)











  Temp Pulse Resp BP Pulse Ox


 


 11/23/20 11:10   77   162/98 H 


 


 11/23/20 11:00  97.8 F    


 


 11/23/20 10:00    10 L  


 


 11/23/20 08:12   84   204/97 H 


 


 11/23/20 08:10   84  12   95


 


 11/23/20 08:00  98.4 F   10 L   95


 


 11/23/20 06:00    10 L  


 


 11/23/20 04:00    10 L  


 


 11/23/20 02:45   74   150/61 H 


 


 11/23/20 02:00    10 L  


 


 11/23/20 00:00    10 L  








                                     Weight











Weight                         361 lb 12.457 oz











                            Most Recent Monitor Data











Heart Rate from ECG            83


 


NIBP                           162/98


 


NIBP BP-Mean                   119


 


Respiration from ECG           10


 


SpO2                           94














I&O: 


                                        











 11/22/20 11/23/20 11/24/20





 06:59 06:59 06:59


 


Intake Total 2098 3034 188


 


Output Total 1620 1424 137


 


Balance 478 1610 51











Result Diagrams: 


                                 11/23/20 03:55





                                 11/23/20 03:55


Additional Labs: 


                                   Accuchecks











  11/23/20 11/23/20 11/22/20





  09:16 00:16 20:35


 


POC Glucose  227 H  208 H  228 H














  11/22/20 11/22/20





  16:52 12:38


 


POC Glucose  217 H  227 H














Hospitalist ROS





- Medication


Medications: 


Active Medications











Generic Name Dose Route Start Last Admin





  Trade Name Freq  PRN Reason Stop Dose Admin


 


Albuterol/Ipratropium  3 ml  11/21/20 19:00  11/23/20 08:10





  Ipratropium/Albuterol Sulfate 3 Ml Neb  NEB   3 ml





  [id number] SANDEE   Administration


 


Aspirin  81 mg  11/22/20 09:00  11/23/20 08:14





  Aspirin Chewable 81 Mg Tab  PO   81 mg





  DAILY SANDEE   Administration


 


Bisacodyl  10 mg  11/21/20 17:39  11/23/20 05:33





  Bisacodyl 10 Mg Supp  RI   10 mg





  DAILYPRN PRN   Administration





  Constipation  


 


Cyanocobalamin  1,000 mcg  11/23/20 09:00  11/23/20 09:09





  Cyanocobalamin (Vitamin B-12) 1,000 Mcg Tab  PER TUBE   1,000 mcg





  DAILY SANDEE   Administration


 


Diltiazem HCl  30 mg  11/22/20 15:00  11/23/20 08:14





  Diltiazem Hcl 30 Mg Tablet  PO   30 mg





  TID SANDEE   Administration


 


Enoxaparin Sodium  40 mg  11/21/20 21:00  11/22/20 20:30





  Enoxaparin Sodium 40 Mg/0.4 Ml Syringe  SC   40 mg





  2100 SANDEE   Administration


 


Folic Acid  1 mg  11/23/20 09:00  11/23/20 08:14





  Folic Acid 1 Mg Tab  PER TUBE   1 mg





  DAILY SANDEE   Administration


 


Hydralazine HCl  10 mg  11/21/20 20:06  11/22/20 07:30





  Hydralazine 20 Mg/Ml Vial  SLOW IVP   10 mg





  Q4H PRN   Administration





  SBP Greater Than 180  


 


Azithromycin 500 mg/ Sodium  250 mls @ 250 mls/hr  11/22/20 14:00  11/22/20 

16:57





  Chloride  IVPB   250 mls





  1400 SANDEE   Administration


 


Sodium Chloride  1,000 mls @ 30 mls/hr  11/21/20 19:15  11/22/20 20:28





  Normal Saline 0.9%  IV   1,000 mls





  .Q24H SANDEE   Administration


 


Ceftriaxone Sodium 1 gm/  100 mls @ 200 mls/hr  11/22/20 15:00  11/22/20 14:34





  Sodium Chloride  IVPB   100 mls





  1500 SANDEE   Administration


 


Fentanyl Citrate 2,000 mcg/  100 mls @ 0 mls/hr  11/21/20 19:00  11/22/20 23:35





  Sodium Chloride  IV  12/21/20 19:00  100 mls





  INF SANDEE   Administration





  Protocol  





  Per Protocol  


 


Insulin Glargine 25 units/  0.25 mls @ 0 mls/hr  11/22/20 21:00  11/23/20 11:39





  Miscellaneous Medication  SC   0.25 mls





  BID SANDEE   Administration


 


Insulin Human Regular  0 units  11/21/20 17:45  11/22/20 20:36





  Insulin Regular 300 Units/3 Ml Vial  SC   3 units





  .BEDTIME SLIDING SC PRN   Administration





  Bedtime Correctional Scale  


 


Insulin Human Regular  0 units  11/21/20 19:07  11/23/20 09:35





  Insulin Regular 300 Units/3 Ml Vial  SC   6 unit





  .AGGRESSIVE SLIDING  PRN   Administration





  Aggressive Correctional Scale  


 


Labetalol HCl  10 mg  11/21/20 20:15  11/22/20 04:42





  Labetalol Hcl 100 Mg/20 Ml Vial  SLOW IVP   10 mg





  Q4H PRN   Administration





  Systolic BP > 180  


 


Lisinopril  20 mg  11/21/20 21:00  11/22/20 20:31





  Lisinopril 20 Mg Tab  PO   20 mg





  HS SANDEE   Administration


 


Lorazepam  2 mg  11/21/20 19:00  11/21/20 23:08





  Lorazepam 2 Mg/Ml Vial  SLOW IVP  12/21/20 19:00  2 mg





  Q1H PRN   Administration





  Breakthrough agitation  


 


Methylprednisolone Sodium Succinate  20 mg  11/23/20 09:00  11/23/20 09:08





  Methylprednisolone Sod Succ 40 Mg Vial  IVP   20 mg





  BID SANDEE   Administration


 


Metoprolol Succinate  50 mg  11/22/20 21:00  11/23/20 08:14





  Metoprolol Succinate Xl 50 Mg Tab  PO   50 mg





  BID SANDEE   Administration


 


Multivitamins  1 tab  11/23/20 09:00  11/23/20 08:15





  Multivit, Therapeutic 1 Tab  PER TUBE   1 tab





  DAILY SANDEE   Administration


 


Pantoprazole Sodium  40 mg  11/22/20 09:00  11/23/20 08:15





  Pantoprazole 40 Mg Tab  PO   40 mg





  DAILY SANDEE   Administration


 


Propofol  1,000 mg  11/21/20 19:00  11/23/20 08:00





  Propofol 1,000 Mg/100 Ml Vial  IV  12/21/20 19:00  1,000 mg





  INF PRN   Administration





  TO ACHIEVE GOAL RASS  





  Protocol  














- Exam


Neck: no JVD


Heart: RRR, no murmur


Respiratory - other findings: clear anterior. marked decreased BS with rales 

post


Gastrointestinal: soft, non-tender, non-distended, normal bowel sounds


Extremities - other findings: mild anasarca





Hosp A/P


(1) Acute on chronic diastolic (congestive) heart failure


Code(s): I50.33 - ACUTE ON CHRONIC DIASTOLIC (CONGESTIVE) HEART FAILURE   

Status: Resolved   





(2) Acute respiratory failure with hypoxia


Code(s): J96.01 - ACUTE RESPIRATORY FAILURE WITH HYPOXIA   Status: Acute   





(3) Asthma


Code(s): J45.909 - UNSPECIFIED ASTHMA, UNCOMPLICATED   Status: Chronic   


Qualifiers: 


   Asthma severity: mild   Asthma persistence: intermittent   Asthma comp

lication type: uncomplicated   Qualified Code(s): J45.20 - Mild intermittent 

asthma, uncomplicated   





(4) DM type 2 (diabetes mellitus, type 2)


Status: Chronic   


Qualifiers: 


   Diabetes mellitus long term insulin use: with long term use   Diabetes 

mellitus complication status: with kidney complications   Diabetes mellitus 

complication detail: with chronic kidney disease   Chronic kidney disease stage:

stage 3 (moderate)   Qualified Code(s): E11.22 - Type 2 diabetes mellitus with 

diabetic chronic kidney disease; N18.3 - Chronic kidney disease, stage 3 

(moderate); Z79.4 - Long term (current) use of insulin   





(5) HTN (hypertension)


Code(s): I10 - ESSENTIAL (PRIMARY) HYPERTENSION   Status: Chronic   


Qualifiers: 


   Hypertension type: essential hypertension 





- Plan





wean from vent as able


Cardiology reduced b-blocker


gentle diuresis


accu/ss LA insulin

## 2020-11-24 LAB
ANALYZER IN CARDIO: (no result)
ANION GAP SERPL CALC-SCNC: 18 MMOL/L (ref 10–20)
ANISOCYTOSIS BLD QL SMEAR: (no result) (100X)
BASE EXCESS STD BLDA CALC-SCNC: 3.1 MEQ/L
BASOPHILS # BLD AUTO: 0 THOU/UL (ref 0–0.2)
BASOPHILS NFR BLD AUTO: 0.1 % (ref 0–1)
BUN SERPL-MCNC: 43 MG/DL (ref 9.8–20.1)
CA-I BLDA-SCNC: 1.12 MMOL/L (ref 1.12–1.3)
CALCIUM SERPL-MCNC: 9 MG/DL (ref 7.8–10.44)
CHLORIDE SERPL-SCNC: 99 MMOL/L (ref 98–107)
CO2 SERPL-SCNC: 30 MMOL/L (ref 22–29)
CREAT CL PREDICTED SERPL C-G-VRATE: 62 ML/MIN (ref 70–130)
EOSINOPHIL # BLD AUTO: 0 THOU/UL (ref 0–0.7)
EOSINOPHIL NFR BLD AUTO: 0.3 % (ref 0–10)
GLUCOSE SERPL-MCNC: 179 MG/DL (ref 70–105)
HCO3 BLDA-SCNC: 29.1 MEQ/L (ref 22–28)
HCT VFR BLDA CALC: 23 % (ref 36–47)
HGB BLD-MCNC: 7.4 G/DL (ref 12–16)
HGB BLDA-MCNC: 7.8 G/DL (ref 12–16)
LYMPHOCYTES # BLD: 1.1 THOU/UL (ref 1.2–3.4)
LYMPHOCYTES NFR BLD AUTO: 7.9 % (ref 21–51)
MCH RBC QN AUTO: 23.5 PG (ref 27–31)
MCV RBC AUTO: 78.1 FL (ref 78–98)
MDIFF COMPLETE?: YES
MONOCYTES # BLD AUTO: 0.7 THOU/UL (ref 0.11–0.59)
MONOCYTES NFR BLD AUTO: 4.9 % (ref 0–10)
NEUTROPHILS # BLD AUTO: 11.9 THOU/UL (ref 1.4–6.5)
NEUTROPHILS NFR BLD AUTO: 86.8 % (ref 42–75)
O2 A-A PPRESDIFF RESPIRATORY: 144.97 MMHG (ref 0–20)
PCO2 BLDA: 52.5 MMHG (ref 35–45)
PH BLDA: 7.36 [PH] (ref 7.35–7.45)
PLATELET # BLD AUTO: 387 THOU/UL (ref 130–400)
PO2 BLDA: 74.6 MMHG (ref 80–100)
POTASSIUM BLD-SCNC: 3.5 MMOL/L (ref 3.7–5.3)
POTASSIUM SERPL-SCNC: 3.5 MMOL/L (ref 3.5–5.1)
RBC # BLD AUTO: 3.15 MILL/UL (ref 4.2–5.4)
SODIUM SERPL-SCNC: 143 MMOL/L (ref 136–145)
SPECIMEN DRAWN FROM PATIENT: (no result)
WBC # BLD AUTO: 13.7 THOU/UL (ref 4.8–10.8)

## 2020-11-24 RX ADMIN — CEFTRIAXONE SCH MLS: 1 INJECTION, POWDER, FOR SOLUTION INTRAMUSCULAR; INTRAVENOUS at 09:46

## 2020-11-24 RX ADMIN — INSULIN GLARGINE SCH: 100 INJECTION, SOLUTION SUBCUTANEOUS at 21:36

## 2020-11-24 RX ADMIN — FENTANYL CITRATE SCH MLS: 50 INJECTION, SOLUTION INTRAMUSCULAR; INTRAVENOUS at 08:20

## 2020-11-24 RX ADMIN — THERA TABS SCH TAB: TAB at 09:44

## 2020-11-24 RX ADMIN — AZITHROMYCIN SCH MLS: 500 INJECTION, POWDER, LYOPHILIZED, FOR SOLUTION INTRAVENOUS at 09:43

## 2020-11-24 RX ADMIN — ASPIRIN 81 MG CHEWABLE TABLET SCH MG: 81 TABLET CHEWABLE at 09:44

## 2020-11-24 RX ADMIN — INSULIN HUMAN PRN UNIT: 100 INJECTION, SOLUTION PARENTERAL at 04:21

## 2020-11-24 RX ADMIN — INSULIN GLARGINE SCH: 100 INJECTION, SOLUTION SUBCUTANEOUS at 09:10

## 2020-11-24 RX ADMIN — NITROGLYCERIN PRN INCH: 20 OINTMENT TOPICAL at 21:55

## 2020-11-24 RX ADMIN — CYANOCOBALAMIN TAB 1000 MCG SCH MCG: 1000 TAB at 09:47

## 2020-11-24 NOTE — PDOC.HOSPP
- Subjective


Encounter Date: 11/24/20


Encounter Time: 07:53


Subjective: 


unresponsive





- Objective


Vital Signs & Weight: 


                             Vital Signs (12 hours)











  Temp Pulse Resp BP Pulse Ox


 


 11/24/20 07:19   70   169/81 H 


 


 11/24/20 06:00    10 L  


 


 11/24/20 04:00  97.7 F   13  


 


 11/24/20 02:39   79   161/91 H 


 


 11/24/20 02:00    10 L  


 


 11/24/20 00:26   64  10 L   97


 


 11/24/20 00:00  98.5 F   10 L  


 


 11/23/20 22:26   68   144/69 H 


 


 11/23/20 22:00    10 L  


 


 11/23/20 20:12     123/57 L 


 


 11/23/20 20:00  98.4 F   10 L   97








                                     Weight











Admit Weight                   361 lb


 


Weight                         337 lb 8.443 oz











                            Most Recent Monitor Data











Heart Rate from ECG            74


 


NIBP                           169/81


 


NIBP BP-Mean                   110


 


Respiration from ECG           10


 


SpO2                           99














I&O: 


                                        











 11/23/20 11/24/20 11/25/20





 06:59 06:59 06:59


 


Intake Total 3034 1464 


 


Output Total 1424 2232 155


 


Balance 1610 -768 -155











Result Diagrams: 


                                 11/24/20 03:20





                                 11/24/20 03:30


Additional Labs: 


                                   Accuchecks











  11/24/20 11/23/20 11/23/20





  03:22 23:07 20:17


 


POC Glucose  170 H  133 H  142 H














  11/23/20 11/23/20 11/23/20





  16:38 12:22 09:16


 


POC Glucose  177 H  200 H  227 H














Hospitalist ROS





- Medication


Medications: 


Active Medications











Generic Name Dose Route Start Last Admin





  Trade Name Freq  PRN Reason Stop Dose Admin


 


Albuterol/Ipratropium  3 ml  11/21/20 19:00  11/24/20 07:19





  Ipratropium/Albuterol Sulfate 3 Ml Neb  NEB   3 ml





  [id number] SANDEE   Administration


 


Aspirin  81 mg  11/22/20 09:00  11/23/20 08:14





  Aspirin Chewable 81 Mg Tab  PO   81 mg





  DAILY SANDEE   Administration


 


Bisacodyl  10 mg  11/21/20 17:39  11/23/20 05:33





  Bisacodyl 10 Mg Supp  VT   10 mg





  DAILYPRN PRN   Administration





  Constipation  


 


Cyanocobalamin  1,000 mcg  11/23/20 09:00  11/23/20 09:09





  Cyanocobalamin (Vitamin B-12) 1,000 Mcg Tab  PER TUBE   1,000 mcg





  DAILY SANDEE   Administration


 


Enoxaparin Sodium  40 mg  11/21/20 21:00  11/23/20 20:12





  Enoxaparin Sodium 40 Mg/0.4 Ml Syringe  SC   40 mg





  2100 SANDEE   Administration


 


Folic Acid  1 mg  11/23/20 09:00  11/23/20 08:14





  Folic Acid 1 Mg Tab  PER TUBE   1 mg





  DAILY SANDEE   Administration


 


Hydralazine HCl  10 mg  11/21/20 20:06  11/23/20 15:25





  Hydralazine 20 Mg/Ml Vial  SLOW IVP   10 mg





  Q4H PRN   Administration





  SBP Greater Than 180  


 


Sodium Chloride  1,000 mls @ 30 mls/hr  11/21/20 19:15  11/23/20 20:13





  Normal Saline 0.9%  IV   Not Given





  .Q24H SANDEE  


 


Fentanyl Citrate 2,000 mcg/  100 mls @ 0 mls/hr  11/21/20 19:00  11/23/20 15:45





  Sodium Chloride  IV  12/21/20 19:00  100 mls





  INF SANDEE   Administration





  Protocol  





  Per Protocol  


 


Insulin Glargine 35 units/  0.35 mls @ 0 mls/hr  11/23/20 21:00  11/23/20 23:24





  Miscellaneous Medication  SC   Not Given





  Q12HR [facility]  


 


Insulin Human Regular  0 units  11/21/20 17:45  11/22/20 20:36





  Insulin Regular 300 Units/3 Ml Vial  SC   3 units





  .BEDTIME SLIDING SC PRN   Administration





  Bedtime Correctional Scale  


 


Insulin Human Regular  0 units  11/21/20 19:07  11/24/20 04:21





  Insulin Regular 300 Units/3 Ml Vial  SC   3 unit





  .AGGRESSIVE SLIDING  PRN   Administration





  Aggressive Correctional Scale  


 


Labetalol HCl  10 mg  11/21/20 20:15  11/23/20 13:48





  Labetalol Hcl 100 Mg/20 Ml Vial  SLOW IVP   10 mg





  Q4H PRN   Administration





  Systolic BP > 180  


 


Lisinopril  20 mg  11/21/20 21:00  11/23/20 20:12





  Lisinopril 20 Mg Tab  PO   20 mg





  HS SANDEE   Administration


 


Lorazepam  2 mg  11/21/20 19:00  11/21/20 23:08





  Lorazepam 2 Mg/Ml Vial  SLOW IVP  12/21/20 19:00  2 mg





  Q1H PRN   Administration





  Breakthrough agitation  


 


Methylprednisolone Sodium Succinate  20 mg  11/23/20 09:00  11/23/20 20:13





  Methylprednisolone Sod Succ 40 Mg Vial  IVP   20 mg





  BID SANDEE   Administration


 


Multivitamins  1 tab  11/23/20 09:00  11/23/20 08:15





  Multivit, Therapeutic 1 Tab  PER TUBE   1 tab





  DAILY SANDEE   Administration


 


Pantoprazole Sodium  40 mg  11/22/20 09:00  11/23/20 08:15





  Pantoprazole 40 Mg Tab  PO   40 mg





  DAILY SANDEE   Administration


 


Propofol  1,000 mg  11/21/20 19:00  11/24/20 06:24





  Propofol 1,000 Mg/100 Ml Vial  IV  12/21/20 19:00  1,000 mg





  INF PRN   Administration





  TO ACHIEVE GOAL RASS  





  Protocol  














- Exam


Neck: no JVD


Heart: RRR, no murmur


Respiratory - other findings: decreased BS post with rales


Gastrointestinal: soft, non-distended, diminished bowl sounds


Extremities: 1+ LE edema


Neurological - other findings: eyes open. doesnt track, pos dolls eye manuver





Hosp A/P


(1) Acute on chronic diastolic (congestive) heart failure


Code(s): I50.33 - ACUTE ON CHRONIC DIASTOLIC (CONGESTIVE) HEART FAILURE   

Status: Resolved   





(2) Acute respiratory failure with hypoxia


Code(s): J96.01 - ACUTE RESPIRATORY FAILURE WITH HYPOXIA   Status: Acute   





(3) Asthma


Code(s): J45.909 - UNSPECIFIED ASTHMA, UNCOMPLICATED   Status: Chronic   


Qualifiers: 


   Asthma severity: mild   Asthma persistence: intermittent   Asthma 

complication type: uncomplicated   Qualified Code(s): J45.20 - Mild intermittent

asthma, uncomplicated   





(4) DM type 2 (diabetes mellitus, type 2)


Status: Chronic   


Qualifiers: 


   Diabetes mellitus long term insulin use: with long term use   Diabetes 

mellitus complication status: with kidney complications   Diabetes mellitus 

complication detail: with chronic kidney disease   Chronic kidney disease stage:

stage 3 (moderate)   Qualified Code(s): E11.22 - Type 2 diabetes mellitus with 

diabetic chronic kidney disease; N18.3 - Chronic kidney disease, stage 3 

(moderate); Z79.4 - Long term (current) use of insulin   





(5) HTN (hypertension)


Code(s): I10 - ESSENTIAL (PRIMARY) HYPERTENSION   Status: Chronic   


Qualifiers: 


   Hypertension type: essential hypertension 





(6) Encephalopathy acute


Code(s): G93.40 - ENCEPHALOPATHY, UNSPECIFIED   Status: Acute   





(7) CKD (chronic kidney disease), stage III


Code(s): N18.3 - CHRONIC KIDNEY DISEASE, STAGE 3 (MODERATE) * DO NOT USE *   

Status: Acute   





(8) Obesity hypoventilation syndrome


Code(s): E66.2 - MORBID (SEVERE) OBESITY WITH ALVEOLAR HYPOVENTILATION   Status:

Suspected   





- Plan


DVT proph w/lovenox





wean from vent as able


Cardiology reduced b-blocker


gentle diuresis


accu/ss LA insulin


will discuss with intensivist, ECHOcardiagram

## 2020-11-24 NOTE — PRG
DATE OF SERVICE:  11/24/2020



SUBJECTIVE:  Ms. Hart's status is unchanged.  There is a concern for metabolic

encephalopathy.  Her blood pressure remains markedly elevated.  There is also

concern for her not absorbing her pills given that it is being seen on her NG tube

aspirations. 



OBJECTIVE:  VITAL SIGNS:  Blood pressure 176/87, pulse 70, temperature afebrile. 

LUNGS:  Clear to auscultation. 

HEART:  Regular rate and rhythm. 

ABDOMEN:  Soft, nontender, and nondistended. 

EXTREMITIES:  2+ pitting edema.



PERTINENT LABORATORY DATA:  Hemoglobin 7.4.



IMPRESSION:  

1. Respiratory failure.

2. Metabolic encephalopathy.

3. Hypertension.

4. Renal insufficiency.



RECOMMENDATIONS:  Echo has been ordered by Dr. Leonarda Lockhart.  Her last echo was

dated 11/30/2019 with LVEF not well visualized.  Unfortunately, echo images are

unlikely to be different, but we will reassess. 



I have made some changes in her medications, but unfortunately given that it does

not appear she is absorbing p.o., may consider IV hydralazine versus IV Cardizem.

Her creatinine is 2.67 with a GFR of 23.  We would also recommend renal consultation

to assist in management.  Her elevated blood pressure may also be secondary to renal

dysfunction. 







Job ID:  [id number]

## 2020-11-24 NOTE — CON
DATE OF CONSULTATION:  11/24/2020



CONSULTING PHYSICIAN:  [facility]ospitalist Services.



IMPRESSION:  

1. Multifactorial encephalopathy related to medication.

2. Renal insufficiency.

3. Hypoxia.



PLAN:  

1. Rule out other metabolic factors.

2. Continue supportive care and weaning off sedation as possible.



HISTORY OF PRESENT ILLNESS:  Ms. Hart is a 55-year-old obese black female with

history of COPD.  She was admitted with respiratory failure secondary to her COPD.

She has been intubated and sedated for the last few days.  She was noted to have

elevated BUN and creatinine.  She has been gradually weaned from the sedation.  She

started to awaken and was able to follow some commands with Dr. Barajas earlier in

the day when she has lightened up too much, the nurses reports she becomes agitated

and fights the ventilator.  She is moving all 4 extremities for them during these

periods of agitation.  She had an EEG done earlier today, which showed some diffuse

slowing, but no epileptiform features.  Her other available lab is otherwise

unremarkable. 



PAST MEDICAL HISTORY:  As noted per chart.



ALLERGIES:  PENICILLIN.



SOCIAL HISTORY:  No alcohol or drug use known.



FAMILY HISTORY:  Unknown.



REVIEW OF SYSTEMS:  Not obtainable.



PHYSICAL EXAMINATION:

GENERAL:  She is a morbidly obese, middle-aged woman, on ventilatory support. 

VITAL SIGNS:  Have been stable.  She is afebrile. 

HEENT:  Pupils equal and reactive.  Conjunctivae clear.  Eyes are conjugate.  She is

orally intubated. 

NECK:  No lymphadenopathy noted. 

ABDOMEN:  Rotund, soft, without any obvious tenderness. 

EXTREMITIES:  There is some peripheral swelling, but no cyanosis.  Her skin is

clear. 

NEUROLOGIC:  She would awaken when stimulated verbally.  I could not get her to

follow any commands purposefully.  She would localize to auditory stimuli.  Her tone

appeared to be symmetric.  Plantar responses were mute.  No abnormal movements were

seen. 



DIAGNOSTIC STUDIES:  Chest x-rays were reviewed.



SUMMARY:  This is a middle-aged woman who has been sedated for the last few days due

to her respiratory failure.  She has some renal insufficiency, which may contribute

to some degree to prolonged clearing of drugs.  Rule out a few other metabolic

factors, although more than likely it will just take some time for her to clear. 







Job ID:  [id number]

## 2020-11-24 NOTE — PRG
DATE OF SERVICE:  11/24/2020



35 minutes of critical care time.



SUBJECTIVE:  The patient remains intubated on mechanical ventilation.  I can get her

to wake up and follow commands without limitation.  She blinks to threat.  She does

move her eyes laterally. 



OBJECTIVE:  VITAL SIGNS:  Temperature 98, pulse 116, and blood pressure 176/87. 

HEENT:  Unremarkable. 

NECK:  No adenopathy or JVD. 

LUNGS:  Coarse breath sounds. 

CARDIAC:  S1, S2.  Regular. 

ABDOMEN:  Soft. 

EXTREMITIES:  No edema.



LABORATORY DATA:  Sodium 143, potassium 3.5, chloride 96, CO2 of 30, BUN 43,

creatinine 2.6, glucose 179.  White blood cell count 13.7, hematocrit 24.6, and

platelet count 387.  The pH 7.36, pCO2 of 52, PO2 of 74 on SIMV rate of 10, tidal

volume 550, PEEP 5, pressure support 10, FiO2 40%. 



X-ray continues to show profound pulmonary vascular congestion.



ASSESSMENT:  

1. Acute respiratory failure, requiring mechanical ventilation.

2. Diastolic dysfunction with overt pulmonary edema.

3. Renal insufficiency.



PLAN:  

1. The patient needs to be diuresed some more, this will compromise her renal

function somewhat.  I do think we probably need to go ahead and hold her lisinopril. 

2. Adjust enoxaparin to a renal dose.

3. Continue Lasix and metolazone.







Job ID:  [id number]

## 2020-11-24 NOTE — RAD
PORTABLE CHEST;

 

Date:  11/24/2020

 

HISTORY:  

CCU follow-up, on ventilator. 

 

COMPARISON:  

11/23/2020. 

 

FINDINGS/IMPRESSION: 

ET tube and NG tube and central line are unchanged. Bilateral effusions with bibasilar infiltrates an
d atelectasis. Linear atelectasis in the right upper lobe again noted. No evidence of acute interval 
change. 

 

 

POS: AGW

## 2020-11-25 LAB
ANALYZER IN CARDIO: (no result)
ANION GAP SERPL CALC-SCNC: 17 MMOL/L (ref 10–20)
ANISOCYTOSIS BLD QL SMEAR: (no result) (100X)
BASE EXCESS STD BLDA CALC-SCNC: 2.4 MEQ/L
BASOPHILS # BLD AUTO: 0 THOU/UL (ref 0–0.2)
BASOPHILS NFR BLD AUTO: 0.3 % (ref 0–1)
BUN SERPL-MCNC: 50 MG/DL (ref 9.8–20.1)
CA-I BLDA-SCNC: 1.13 MMOL/L (ref 1.12–1.3)
CALCIUM SERPL-MCNC: 8.7 MG/DL (ref 7.8–10.44)
CHLORIDE SERPL-SCNC: 99 MMOL/L (ref 98–107)
CO2 SERPL-SCNC: 31 MMOL/L (ref 22–29)
CREAT CL PREDICTED SERPL C-G-VRATE: 57 ML/MIN (ref 70–130)
EOSINOPHIL # BLD AUTO: 0 THOU/UL (ref 0–0.7)
EOSINOPHIL NFR BLD AUTO: 0.3 % (ref 0–10)
GLUCOSE SERPL-MCNC: 101 MG/DL (ref 70–105)
HCO3 BLDA-SCNC: 29.2 MEQ/L (ref 22–28)
HCT VFR BLDA CALC: 24 % (ref 36–47)
HGB BLD-MCNC: 7.2 G/DL (ref 12–16)
HGB BLDA-MCNC: 8 G/DL (ref 12–16)
LYMPHOCYTES # BLD: 1.5 THOU/UL (ref 1.2–3.4)
LYMPHOCYTES NFR BLD AUTO: 11.1 % (ref 21–51)
MCH RBC QN AUTO: 23.5 PG (ref 27–31)
MCV RBC AUTO: 79.1 FL (ref 78–98)
MDIFF COMPLETE?: YES
MONOCYTES # BLD AUTO: 0.9 THOU/UL (ref 0.11–0.59)
MONOCYTES NFR BLD AUTO: 6.7 % (ref 0–10)
NEUTROPHILS # BLD AUTO: 11.1 THOU/UL (ref 1.4–6.5)
NEUTROPHILS NFR BLD AUTO: 81.6 % (ref 42–75)
O2 A-A PPRESDIFF RESPIRATORY: 148.47 MMHG (ref 0–20)
PCO2 BLDA: 59.7 MMHG (ref 35–45)
PH BLDA: 7.31 [PH] (ref 7.35–7.45)
PLATELET # BLD AUTO: 393 THOU/UL (ref 130–400)
PO2 BLDA: 62.1 MMHG (ref 80–100)
POTASSIUM BLD-SCNC: 3.52 MMOL/L (ref 3.7–5.3)
POTASSIUM SERPL-SCNC: 3.5 MMOL/L (ref 3.5–5.1)
RBC # BLD AUTO: 3.04 MILL/UL (ref 4.2–5.4)
SODIUM SERPL-SCNC: 143 MMOL/L (ref 136–145)
SPECIMEN DRAWN FROM PATIENT: (no result)
WBC # BLD AUTO: 13.6 THOU/UL (ref 4.8–10.8)

## 2020-11-25 RX ADMIN — INSULIN GLARGINE SCH: 100 INJECTION, SOLUTION SUBCUTANEOUS at 21:21

## 2020-11-25 RX ADMIN — FENTANYL CITRATE SCH MLS: 50 INJECTION, SOLUTION INTRAMUSCULAR; INTRAVENOUS at 00:28

## 2020-11-25 RX ADMIN — CYANOCOBALAMIN TAB 1000 MCG SCH MCG: 1000 TAB at 09:06

## 2020-11-25 RX ADMIN — INSULIN GLARGINE SCH: 100 INJECTION, SOLUTION SUBCUTANEOUS at 10:23

## 2020-11-25 RX ADMIN — INSULIN GLARGINE SCH MLS: 100 INJECTION, SOLUTION SUBCUTANEOUS at 08:37

## 2020-11-25 RX ADMIN — AZITHROMYCIN SCH MLS: 500 INJECTION, POWDER, LYOPHILIZED, FOR SOLUTION INTRAVENOUS at 09:14

## 2020-11-25 RX ADMIN — THERA TABS SCH TAB: TAB at 08:36

## 2020-11-25 RX ADMIN — CEFTRIAXONE SCH MLS: 1 INJECTION, POWDER, FOR SOLUTION INTRAMUSCULAR; INTRAVENOUS at 08:37

## 2020-11-25 RX ADMIN — ASPIRIN 81 MG CHEWABLE TABLET SCH MG: 81 TABLET CHEWABLE at 08:36

## 2020-11-25 NOTE — PRG
DATE OF SERVICE:  11/25/2020



SUBJECTIVE:  Patient is seen and examined at bedside.  She is in ICU.  She is

intubated.  Bedside nurse is updated. 



OBJECTIVE:  GENERAL:  This is a morbidly obese female, intubated. 

VITAL SIGNS:  Temperature 99.0, pulse 91, respiratory rate 14, blood pressure

170/78. 

HEENT:  Intubated. 

CV:  S1 and S2 heard. 

RESPIRATORY:  Wheezes. 

GASTROINTESTINAL:  Abdomen is obese. 

MUSCULOSKELETAL:  1 to 2+ edema. 

NEUROLOGIC:  Intubated.



LABORATORY DATA:  Potassium 3.5, BUN is 50, creatinine is 2.7.



ASSESSMENT AND PLAN:  

1. Acute kidney injury on chronic kidney disease, stage 3 secondary to cardiorenal

syndrome.  Agree with diuretics for now. 

2. Acute hypoxic respiratory failure.  Remains intubated.

3. History of diastolic heart failure.

4. Cardiorenal syndrome.

5. Alkalosis edema.

6. Mild hypokalemia.  

7. Replace potassium cautiously and continue diuretics as tolerated.







Job ID:  [id number]

## 2020-11-25 NOTE — PRG
DATE OF SERVICE:  



CRITICAL CARE TIME:  35 minutes.



SUBJECTIVE:  The patient is much less interactive with me than she was yesterday.

Her blood pressure is very high. 



OBJECTIVE:  VITAL SIGNS:  Temperature 98.1, pulse 83, blood pressure 216/111, O2

saturation 96%. 

GENERAL: In general, I found her tachypneic, not responding to commands, like she

was yesterday. 

HEENT:  Pupils reactive.  Sclerae anicteric.  Oropharynx clear. 

NECK:  No JVD. 

LUNGS:  Coarse rhonchi CARDIOVASCULAR:  S1, S2.  Regular. 

ABDOMEN:  Obese, soft, nontender. 

EXTREMITIES:  Edematous.



IMAGING:  Echocardiogram did not really show anything new, but it is a suboptimal

view. 



LABORATORY DATA:  White blood cell count 13.6, hematocrit 24, and platelet count

393, pH 7.31, pCO2 of 59, pO2 of 62, that was on CPAP 5, pressure support 10, FiO2

of 40%.  Sodium 143, potassium 3.5, chloride 99, CO2 of 31, BUN 50, creatinine 2.7,

glucose 101. 



ASSESSMENT:  

1. Diastolic congestive heart failure.

2. Acute respiratory failure requiring mechanical ventilation.

3. Metabolic encephalopathy.

4. Renal insufficiency.



PLAN:  I did not deem the patient stable to extubate.  She really needs to have more

fluid removed, but it is a delicate balance given her renal function.  I will give

her another dose of Lasix today.  I have put her back on volume control ventilation. 







Job ID:  [id number]

## 2020-11-25 NOTE — CON
DATE OF CONSULTATION:  11/24/2020



CONSULTING PHYSICIAN:  Shay Lozano MD



REASON FOR CONSULTATION:  Acute kidney injury.



REASON FOR ADMISSION:  Shortness of breath.



HISTORY OF PRESENT ILLNESS:  A 55-year-old female with history of congestive heart

failure with diastolic heart failure, asthma, obstructive sleep apnea, and

hypertension, came to the hospital with shortness of breath and is being treated.

She remains in ICU intubated.  She was COVID negative, but she was found to be fluid

overloaded, started on IV diuretics today, but her creatinine was rising up.

Nephrology is consulted.  The patient's baseline creatinine seems to be around 1.4

to 1.6.  Her admission creatinine was 1.4, now it is 2.67 with a BUN of 43.  The

patient is intubated and currently in ICU.  Her blood pressure was elevated and

there is concern that she is not absorbing her oral medications and plan is to

change it to IV medications and monitor her closely.  She is also very fluid

overloaded mostly in the upper extremities.  No family members available.  No

history of any fever or chills reported. 



PAST MEDICAL HISTORY:  Positive for chronic diastolic heart failure, obstructive

sleep apnea, morbid obesity, bronchial asthma, questionable obesity hypoventilation,

hypertension, diabetes, CKD, morbid obesity, anemia, history of non-Hodgkin's

lymphoma, and chronic hypoxic respiratory failure on home oxygen. 



PAST SURGICAL HISTORY:  Cholecystectomy, tubal ligation, Port-A-Cath placement, and

lymph node removal. 



HOME MEDICATIONS:  Reviewed.



ALLERGIES:  TO PENICILLIN.



SOCIAL HISTORY:  No history of smoking, alcohol, or illicit drug abuse reported

initially. 



FAMILY HISTORY:  No history of kidney disease.



REVIEW OF SYSTEMS:  Could not be obtained, she is intubated.



PHYSICAL EXAMINATION:

GENERAL:  This is a morbidly obese female, seen in ICU, intubated. 

VITAL SIGNS:  Temperature 97.9, pulse 86, respiratory rate __________, and blood

pressure __________. 

HEENT:  Intubated. 

CV:  S1 and S2 heard. 

RESPIRATORY:  Scattered wheezes bilaterally. 

GI:  Abdomen is obese and soft. 

MUSCULOSKELETAL:  1 to 2+ edema, more edema in the upper extremities. 

NEUROLOGIC:  Intubated, but following verbal commands, but slowly. 

PSYCHIATRY:  Not assessed.



LABORATORY DATA:  Hemoglobin is 7.4, WBC is 13.7, and platelets is 387.  INR was

1.2.  A pH of 7.36 with a pCO2 of 52.5.  Potassium 3.5, BUN is 43, creatinine is

2.67, bicarb is 30, calcium was 9.0, phosphorus 5.6, and albumin 3.5.  Cortisol

level of 1.4.  Urine showed bacteriuria.  COVID was negative.  Chest x-ray with

signs of bilateral pleural effusion with bibasilar infiltrates, and linear

atelectasis present. 



Echocardiogram pending.



ASSESSMENT AND PLAN:  

1. Acute kidney injury on chronic kidney disease stage 3 most likely seems to be

cardiorenal syndrome.  The patient is on diuretics now.  The patient remains fluid

overloaded and signs of cardiac failure.  Cardiology and Pulmonology are involved.

Agree with diuretics for now.  We will have close monitoring of renal function and

electrolytes.  Agree with holding lisinopril while maintaining diuresis. 

2. Acute hypoxic respiratory failure, intubated.

3. Fluid overload on diuretics now.

4. Cardiorenal syndrome.

5. History of diastolic heart failure.

6. Morbid obesity.

7. Anemia, seems to be from chronic disease.  Rule out any bleed and monitor

hemoglobin. 

8. Alkalosis per the labs.

9. Mild hyperphosphatemia.

10. Mild hypoalbuminemia.

11. Edema.

12. History of hypertension, currently hypertensive, titrate medication and now on

diuretics. 

13. Agree with diuretics with close monitoring of renal function and electrolytes.

Agree with holding the lisinopril while on diuretics and agree with titrating blood

pressure medicine.  Consider changing to IV with concerns for poor absorption of

medications orally. 

14. We will continue to follow.  Thank you for the consult.  Renally dose the

medications. 







Job ID:  [id number]

## 2020-11-25 NOTE — PDOC.HOSPP
- Subjective


Encounter Date: 11/25/20


Encounter Time: 10:48


Subjective: 


non-responsive to painfull stimuli. toes neutral to plantar stimulation. DTRs 

grossly symmetric





- Objective


Vital Signs & Weight: 


                             Vital Signs (12 hours)











  Temp Pulse Resp BP Pulse Ox


 


 11/25/20 10:24   91   


 


 11/25/20 10:00    14  


 


 11/25/20 09:00  99.0 F    


 


 11/25/20 08:36   85   214/108 H 


 


 11/25/20 08:00    14  


 


 11/25/20 06:26   85   


 


 11/25/20 06:00    15  


 


 11/25/20 04:00  98.1 F   16  


 


 11/25/20 02:34   93   214/108 H 


 


 11/25/20 02:30   93   214/108 H 


 


 11/25/20 02:00    17  


 


 11/25/20 01:00  98.7 F    


 


 11/25/20 00:39   86  21 H   95


 


 11/25/20 00:00  98.7 F   14  








                                     Weight











Admit Weight                   361 lb


 


Weight                         347 lb 10.703 oz











                            Most Recent Monitor Data











Heart Rate from ECG            91


 


NIBP                           170/78


 


NIBP BP-Mean                   146


 


Respiration from ECG           13


 


SpO2                           96














I&O: 


                                        











 11/24/20 11/25/20 11/26/20





 06:59 06:59 06:59


 


Intake Total 1464 1299.9 80


 


Output Total 2232 1692 210


 


Balance -768 -392.1 -130











Result Diagrams: 


                                 11/25/20 04:35





                                 11/25/20 04:35


Additional Labs: 


                                   Accuchecks











  11/25/20 11/25/20 11/24/20





  10:22 00:05 21:07


 


POC Glucose  88  97  109 H














  11/24/20 11/24/20





  17:15 12:34


 


POC Glucose  119 H  125 H














Hospitalist ROS





- Medication


Medications: 


Active Medications











Generic Name Dose Route Start Last Admin





  Trade Name Freq  PRN Reason Stop Dose Admin


 


Albuterol/Ipratropium  3 ml  11/21/20 19:00  11/25/20 06:20





  Ipratropium/Albuterol Sulfate 3 Ml Neb  NEB   3 ml





  [id number] SANDEE   Administration


 


Amlodipine Besylate  5 mg  11/25/20 09:00  11/25/20 08:36





  Amlodipine 5 Mg Tab  PO   5 mg





  BID SANDEE   Administration


 


Aspirin  81 mg  11/22/20 09:00  11/25/20 08:36





  Aspirin Chewable 81 Mg Tab  PO   81 mg





  DAILY SANDEE   Administration


 


Bisacodyl  10 mg  11/21/20 17:39  11/23/20 05:33





  Bisacodyl 10 Mg Supp  DE   10 mg





  DAILYPRN PRN   Administration





  Constipation  


 


Carvedilol  6.25 mg  11/24/20 09:00  11/25/20 08:36





  Carvedilol 6.25 Mg Tab  PO   6.25 mg





  TID SANDEE   Administration


 


Cyanocobalamin  1,000 mcg  11/23/20 09:00  11/25/20 09:06





  Cyanocobalamin (Vitamin B-12) 1,000 Mcg Tab  PER TUBE   1,000 mcg





  DAILY SANDEE   Administration


 


Enoxaparin Sodium  30 mg  11/24/20 21:00  11/24/20 20:56





  Enoxaparin Sodium 30 Mg/0.3 Ml Syringe  SC   30 mg





  2100 SANDEE   Administration


 


Folic Acid  1 mg  11/23/20 09:00  11/25/20 08:36





  Folic Acid 1 Mg Tab  PER TUBE   1 mg





  DAILY SANDEE   Administration


 


Furosemide  40 mg  11/25/20 08:45  11/25/20 09:28





  Furosemide 40 Mg/4 Ml Vial  SLOW IVP  11/25/20 11:00  40 mg





  NOW SANDEE   Administration


 


Hydralazine HCl  10 mg  11/21/20 20:06  11/24/20 17:17





  Hydralazine 20 Mg/Ml Vial  SLOW IVP   10 mg





  Q4H PRN   Administration





  SBP Greater Than 180  


 


Sodium Chloride  1,000 mls @ 30 mls/hr  11/21/20 19:15  11/24/20 20:35





  Normal Saline 0.9%  IV   Not Given





  .Q24H SANDEE  


 


Fentanyl Citrate 2,000 mcg/  100 mls @ 0 mls/hr  11/21/20 19:00  11/25/20 00:28





  Sodium Chloride  IV  12/21/20 19:00  100 mls





  INF SANDEE   Administration





  Protocol  





  Per Protocol  


 


Insulin Glargine 35 units/  0.35 mls @ 0 mls/hr  11/23/20 21:00  11/25/20 10:23





  Miscellaneous Medication  SC   Not Given





  Q12HR SANDEE  


 


Azithromycin 500 mg/ Sodium  250 mls @ 250 mls/hr  11/24/20 09:00  11/25/20 

09:14





  Chloride  IVPB   250 mls





  0900 SANDEE   Administration


 


Ceftriaxone Sodium 1 gm/  100 mls @ 200 mls/hr  11/24/20 09:00  11/25/20 08:37





  Sodium Chloride  IVPB   100 mls





  0900 SANDEE   Administration


 


Insulin Human Regular  0 units  11/21/20 17:45  11/22/20 20:36





  Insulin Regular 300 Units/3 Ml Vial  SC   3 units





  .BEDTIME SLIDING SC PRN   Administration





  Bedtime Correctional Scale  


 


Insulin Human Regular  0 units  11/21/20 19:07  11/24/20 04:21





  Insulin Regular 300 Units/3 Ml Vial  SC   3 unit





  .AGGRESSIVE SLIDING  PRN   Administration





  Aggressive Correctional Scale  


 


Labetalol HCl  10 mg  11/21/20 20:15  11/25/20 02:34





  Labetalol Hcl 100 Mg/20 Ml Vial  SLOW IVP   10 mg





  Q4H PRN   Administration





  Systolic BP > 180  


 


Lorazepam  2 mg  11/21/20 19:00  11/25/20 02:34





  Lorazepam 2 Mg/Ml Vial  SLOW IVP  12/21/20 19:00  2 mg





  Q1H PRN   Administration





  Breakthrough agitation  


 


Methylprednisolone Sodium Succinate  20 mg  11/23/20 09:00  11/25/20 08:36





  Methylprednisolone Sod Succ 40 Mg Vial  IVP   20 mg





  BID SANDEE   Administration


 


Multivitamins  1 tab  11/23/20 09:00  11/25/20 08:36





  Multivit, Therapeutic 1 Tab  PER TUBE   1 tab





  DAILY SANDEE   Administration


 


Nitroglycerin  0.5 inch  11/22/20 16:30  11/24/20 21:55





  Nitroglycerin 2% Ointment 1 Inch/1 Gm Packet  TOP   0.5 inch





  Q8H PRN   Administration





  SBP Greater Than 180  


 


Pantoprazole Sodium  40 mg  11/22/20 09:00  11/25/20 08:36





  Pantoprazole 40 Mg Tab  PO   40 mg





  DAILY SANDEE   Administration


 


Propofol  1,000 mg  11/21/20 19:00  11/24/20 21:54





  Propofol 1,000 Mg/100 Ml Vial  IV  12/21/20 19:00  1,000 mg





  INF PRN   Administration





  TO ACHIEVE GOAL RASS  





  Protocol  














- Exam


Neck: no JVD


Heart: RRR, murmur present, II/IV


Respiratory - other findings: decreased BS with rales posteriorly


Gastrointestinal: soft, non-distended, no palpable masses


Extremities: 2+ LE edema





Hosp A/P


(1) Acute on chronic diastolic (congestive) heart failure


Code(s): I50.33 - ACUTE ON CHRONIC DIASTOLIC (CONGESTIVE) HEART FAILURE   

Status: Resolved   





(2) Acute respiratory failure with hypoxia


Code(s): J96.01 - ACUTE RESPIRATORY FAILURE WITH HYPOXIA   Status: Acute   





(3) Asthma


Code(s): J45.909 - UNSPECIFIED ASTHMA, UNCOMPLICATED   Status: Chronic   


Qualifiers: 


   Asthma severity: mild   Asthma persistence: intermittent   Asthma 

complication type: uncomplicated   Qualified Code(s): J45.20 - Mild intermittent

asthma, uncomplicated   





(4) DM type 2 (diabetes mellitus, type 2)


Status: Chronic   


Qualifiers: 


   Diabetes mellitus long term insulin use: with long term use   Diabetes 

mellitus complication status: with kidney complications   Diabetes mellitus 

complication detail: with chronic kidney disease   Chronic kidney disease stage:

stage 3 (moderate)   Qualified Code(s): E11.22 - Type 2 diabetes mellitus with 

diabetic chronic kidney disease; N18.3 - Chronic kidney disease, stage 3 

(moderate); Z79.4 - Long term (current) use of insulin   





(5) HTN (hypertension)


Code(s): I10 - ESSENTIAL (PRIMARY) HYPERTENSION   Status: Chronic   


Qualifiers: 


   Hypertension type: essential hypertension 





(6) Encephalopathy acute


Code(s): G93.40 - ENCEPHALOPATHY, UNSPECIFIED   Status: Acute   





(7) CKD (chronic kidney disease), stage III


Code(s): N18.3 - CHRONIC KIDNEY DISEASE, STAGE 3 (MODERATE) * DO NOT USE *   

Status: Acute   


Qualifiers: 


   Chronic kidney disease stage 3 subtype: stage 3a (GFR 45-59)   Qualified 

Code(s): N18.31 - Chronic kidney disease, stage 3a   





(8) Obesity hypoventilation syndrome


Code(s): E66.2 - MORBID (SEVERE) OBESITY WITH ALVEOLAR HYPOVENTILATION   Status:

Suspected   





- Plan


ECHO- normal LVEF, no significant valvular issues


renal fcn decreased, judicious use diuretics going forward


currently non-weanable from vent


cont b-blocker


still consiider anoxic brain injury likely


prognosis for recovery guarded

## 2020-11-25 NOTE — PRG
DATE OF SERVICE:  11/25/2020



SUBJECTIVE:  Ms. Hart is currently intubated and sedated.  She is on propofol.



OBJECTIVE:  VITAL SIGNS:  Blood pressure 149/75, pulse 83, temperature afebrile. 

LUNGS:  Clear to auscultation. 

HEART:  Regular rate and rhythm. 

ABDOMEN:  Soft, nontender, nondistended. 

EXTREMITIES:  1+ pitting edema.



PERTINENT LABORATORY DATA:  Hemoglobin 7.2, hematocrit 24, creatinine 2.72.



IMPRESSION:  

1. Respiratory failure.

2. Acute diastolic dysfunction.

3. Metabolic encephalopathy.



RECOMMENDATIONS:  

1. I have increased her Norvasc from 5 mg q.a.m. to b.i.d.

2. Continue aspirin.

3. Increase carvedilol to 12.5 b.i.d. 



Otherwise, at this point, I have no further recommendations.  The patient will

likely have slow improvement in function, but her prognosis remains guarded. 







Job ID:  [id number]

## 2020-11-25 NOTE — CT
CT BRAIN NONCONTRAST:



DATE:

11/25/2020

2:22 PM



HISTORY:

55-year-old female with altered mental status and left upper extremity paresis.



FINDINGS:

There is no evidence of acute intra-axial or extra-axial hemorrhage. There is no midline shift or any
 other mass effect. There is no extra-axial fluid collection. There is no evidence of obstructive

hydrocephalus. Calvarium is intact. There is a small, approximately 1.5 x 0.5 cm patch of moderate hy
podensity in the right corona radiata, very close to the lateral surface of the right caudate body,

consistent with a small lacunar infarction. It is uncertain whether this is old, subacute, or acute. 
Posterior and inferior to this, there is a more hypodense, similar size lesion in the posterior

aspect of the right external capsule, consistent with an old lacunar infarction.



IMPRESSION:

1) very small lacunar infarction in the region of the right corpus striatum of indeterminate age, pos
sibly acute or subacute.

2) no acute intracranial hemorrhage or mass effect.

3) incidental finding of old lacunar infarction in the right external capsule.



Reported By: Eddy Humphreys 

Electronically Signed:  11/25/2020 2:32 PM

## 2020-11-26 LAB
ANALYZER IN CARDIO: (no result)
ANION GAP SERPL CALC-SCNC: 17 MMOL/L (ref 10–20)
BASE EXCESS STD BLDA CALC-SCNC: 2.2 MEQ/L
BASOPHILS # BLD AUTO: 0 THOU/UL (ref 0–0.2)
BASOPHILS NFR BLD AUTO: 0.5 % (ref 0–1)
BUN SERPL-MCNC: 58 MG/DL (ref 9.8–20.1)
CA-I BLDA-SCNC: 1.15 MMOL/L (ref 1.12–1.3)
CALCIUM SERPL-MCNC: 8.5 MG/DL (ref 7.8–10.44)
CHLORIDE SERPL-SCNC: 101 MMOL/L (ref 98–107)
CO2 SERPL-SCNC: 30 MMOL/L (ref 22–29)
CREAT CL PREDICTED SERPL C-G-VRATE: 65 ML/MIN (ref 70–130)
EOSINOPHIL # BLD AUTO: 0 THOU/UL (ref 0–0.7)
EOSINOPHIL NFR BLD AUTO: 0.5 % (ref 0–10)
GLUCOSE SERPL-MCNC: 111 MG/DL (ref 70–105)
HCO3 BLDA-SCNC: 28.6 MEQ/L (ref 22–28)
HCT VFR BLDA CALC: 24 % (ref 36–47)
HGB BLD-MCNC: 6.5 G/DL (ref 12–16)
HGB BLDA-MCNC: 8 G/DL (ref 12–16)
LYMPHOCYTES # BLD: 1 THOU/UL (ref 1.2–3.4)
LYMPHOCYTES NFR BLD AUTO: 9.8 % (ref 21–51)
MCH RBC QN AUTO: 23.5 PG (ref 27–31)
MCV RBC AUTO: 79 FL (ref 78–98)
MONOCYTES # BLD AUTO: 0.6 THOU/UL (ref 0.11–0.59)
MONOCYTES NFR BLD AUTO: 5.4 % (ref 0–10)
NEUTROPHILS # BLD AUTO: 8.6 THOU/UL (ref 1.4–6.5)
NEUTROPHILS NFR BLD AUTO: 83.9 % (ref 42–75)
O2 A-A PPRESDIFF RESPIRATORY: 132.4 MMHG (ref 0–20)
PCO2 BLDA: 54.8 MMHG (ref 35–45)
PH BLDA: 7.34 [PH] (ref 7.35–7.45)
PLATELET # BLD AUTO: 331 THOU/UL (ref 130–400)
PO2 BLDA: 84.3 MMHG (ref 80–100)
POTASSIUM BLD-SCNC: 3.71 MMOL/L (ref 3.7–5.3)
POTASSIUM SERPL-SCNC: 3.9 MMOL/L (ref 3.5–5.1)
RBC # BLD AUTO: 2.75 MILL/UL (ref 4.2–5.4)
SODIUM SERPL-SCNC: 144 MMOL/L (ref 136–145)
SPECIMEN DRAWN FROM PATIENT: (no result)
WBC # BLD AUTO: 10.3 THOU/UL (ref 4.8–10.8)

## 2020-11-26 PROCEDURE — 30233N1 TRANSFUSION OF NONAUTOLOGOUS RED BLOOD CELLS INTO PERIPHERAL VEIN, PERCUTANEOUS APPROACH: ICD-10-PCS | Performed by: FAMILY MEDICINE

## 2020-11-26 RX ADMIN — THERA TABS SCH TAB: TAB at 08:05

## 2020-11-26 RX ADMIN — INSULIN GLARGINE SCH: 100 INJECTION, SOLUTION SUBCUTANEOUS at 09:00

## 2020-11-26 RX ADMIN — INSULIN GLARGINE SCH: 100 INJECTION, SOLUTION SUBCUTANEOUS at 22:09

## 2020-11-26 RX ADMIN — CYANOCOBALAMIN TAB 1000 MCG SCH MCG: 1000 TAB at 09:53

## 2020-11-26 RX ADMIN — CEFTRIAXONE SCH MLS: 1 INJECTION, POWDER, FOR SOLUTION INTRAMUSCULAR; INTRAVENOUS at 08:06

## 2020-11-26 RX ADMIN — ASPIRIN 81 MG CHEWABLE TABLET SCH MG: 81 TABLET CHEWABLE at 08:05

## 2020-11-26 RX ADMIN — PANTOPRAZOLE SODIUM SCH MG: 40 GRANULE, DELAYED RELEASE ORAL at 09:53

## 2020-11-26 RX ADMIN — AZITHROMYCIN SCH MLS: 500 INJECTION, POWDER, LYOPHILIZED, FOR SOLUTION INTRAVENOUS at 09:46

## 2020-11-26 NOTE — PDOC.HOSPP
- Subjective


Encounter Date: 11/26/20


Encounter Time: 15:00


Subjective: 


f/u for acute/chronic resp failure with current SIMV 40% FIO2 with rate 8. No 

new events noted other than low glucose on current Lantus 35u BID and receiving 

Nepro TF's @ 15ml/h with low residuals. 





- Objective


Vital Signs & Weight: 


                             Vital Signs (12 hours)











  Temp Pulse Pulse Pulse Resp BP BP


 


 11/26/20 14:00  99.2 F     12  


 


 11/26/20 13:26   76     


 


 11/26/20 12:00  98 F     17  


 


 11/26/20 11:24    85  85    116/54 L


 


 11/26/20 10:27   78     


 


 11/26/20 10:00      20  


 


 11/26/20 09:53       190/80 H 


 


 11/26/20 08:01       190/80 H 


 


 11/26/20 08:00  98.5 F  96    14  190/80 H 


 


 11/26/20 07:52   96     


 


 11/26/20 06:00      16  


 


 11/26/20 04:00      12  














  BP Pulse Ox Pulse Ox Pulse Ox


 


 11/26/20 14:00    


 


 11/26/20 13:26    


 


 11/26/20 12:00    


 


 11/26/20 11:24  157/72 H   96  99


 


 11/26/20 10:27    


 


 11/26/20 10:00    


 


 11/26/20 09:53    


 


 11/26/20 08:01    


 


 11/26/20 08:00   94 L  


 


 11/26/20 07:52    


 


 11/26/20 06:00    


 


 11/26/20 04:00    








                                     Weight











Admit Weight                   361 lb


 


Weight                         340 lb 9.827 oz











                            Most Recent Monitor Data











Heart Rate from ECG            75


 


NIBP                           129/58


 


NIBP BP-Mean                   81


 


Respiration from ECG           14


 


SpO2                           97














I&O: 


                                        











 11/25/20 11/26/20 11/27/20





 06:59 06:59 06:59


 


Intake Total 1299.9 1308 90


 


Output Total 1692 1765 820


 


Balance -392.1 -457 -730











Result Diagrams: 


                                 11/26/20 03:55





                                 11/26/20 03:55


Additional Labs: 


                                   Accuchecks











  11/25/20 11/25/20





  20:17 17:29


 


POC Glucose  87  97








Microbiology





11/30/19 11:40   Stool   Stool Occult Blood (TAWANDA) - Final


11/21/20 14:54   Urine malagon catheter   Urine Culture - Final


                              NO GROWTH AT 48 HOURS





                                Laboratory Tests











  11/30/19 11/30/19 12/01/19





  05:13 13:25 03:51


 


WBC  13.2 H  


 


Hgb  8.8 L  


 


Neutrophils %  91.6 H   74.9


 


Potassium   5.3 H 


 


BUN   42 H 


 


Creatinine   1.83 H 


 


Iron   


 


Ferritin   


 


TSH 3rd Generation   


 


SARS-CoV-2 Rap RNA(RT-PCR)   














  11/21/20 11/21/20 11/22/20





  16:00 22:06 04:05


 


WBC   


 


Hgb   


 


Neutrophils %   


 


Potassium   


 


BUN   


 


Creatinine   


 


Iron    11 L


 


Ferritin   


 


TSH 3rd Generation   1.1967 


 


SARS-CoV-2 Rap RNA(RT-PCR)  Not Detected  














  11/22/20 11/22/20 11/23/20





  04:05 11:02 03:55


 


WBC   11.2 H 


 


Hgb   7.0 L 


 


Neutrophils %   


 


Potassium   


 


BUN   


 


Creatinine    2.27 H


 


Iron   


 


Ferritin  127.01  


 


TSH 3rd Generation   


 


SARS-CoV-2 Rap RNA(RT-PCR)   














  11/23/20 11/24/20 11/24/20





  03:55 03:20 03:30


 


WBC  10.4  13.7 H 


 


Hgb  6.8 L  7.4 L 


 


Neutrophils %   


 


Potassium   


 


BUN   


 


Creatinine    2.67 H


 


Iron   


 


Ferritin   


 


TSH 3rd Generation   


 


SARS-CoV-2 Rap RNA(RT-PCR)   














  11/25/20 11/25/20





  04:35 04:35


 


WBC   13.6 H


 


Hgb   7.2 L


 


Neutrophils %  


 


Potassium  


 


BUN  


 


Creatinine  2.72 H 


 


Iron  


 


Ferritin  


 


TSH 3rd Generation  


 


SARS-CoV-2 Rap RNA(RT-PCR)  











Radiology Reviewed by me: Yes (2D echo- EF 50-55%, suboptimal exam due to 

habitus)


EKG Reviewed by me: Yes (Tele - SR)





Hospitalist ROS





- Medication


Medications: 


Active Medications











Generic Name Dose Route Start Last Admin





  Trade Name Freq  PRN Reason Stop Dose Admin


 


Albuterol/Ipratropium  3 ml  11/21/20 19:00  11/26/20 13:25





  Ipratropium/Albuterol Sulfate 3 Ml Neb  NEB   3 ml





  [id number] SANDEE   Administration


 


Amlodipine Besylate  5 mg  11/25/20 09:00  11/26/20 08:00





  Amlodipine 5 Mg Tab  PO   5 mg





  BID SANDEE   Administration


 


Aspirin  81 mg  11/22/20 09:00  11/26/20 08:05





  Aspirin Chewable 81 Mg Tab  PO   81 mg





  DAILY SANDEE   Administration


 


Bisacodyl  10 mg  11/21/20 17:39  11/23/20 05:33





  Bisacodyl 10 Mg Supp  ID   10 mg





  DAILYPRN PRN   Administration





  Constipation  


 


Cyanocobalamin  1,000 mcg  11/23/20 09:00  11/26/20 09:53





  Cyanocobalamin (Vitamin B-12) 1,000 Mcg Tab  PER TUBE   1,000 mcg





  DAILY SANDEE   Administration


 


Enoxaparin Sodium  30 mg  11/24/20 21:00  11/25/20 21:21





  Enoxaparin Sodium 30 Mg/0.3 Ml Syringe  SC   30 mg





  2100 SANDEE   Administration


 


Folic Acid  1 mg  11/23/20 09:00  11/26/20 08:06





  Folic Acid 1 Mg Tab  PER TUBE   1 mg





  DAILY SANDEE   Administration


 


Hydralazine HCl  10 mg  11/21/20 20:06  11/24/20 17:17





  Hydralazine 20 Mg/Ml Vial  SLOW IVP   10 mg





  Q4H PRN   Administration





  SBP Greater Than 180  


 


Sodium Chloride  1,000 mls @ 30 mls/hr  11/21/20 19:15  11/26/20 05:05





  Normal Saline 0.9%  IV   Not Given





  .Q24H SANDEE  


 


Fentanyl Citrate 2,000 mcg/  100 mls @ 0 mls/hr  11/21/20 19:00  11/25/20 00:28





  Sodium Chloride  IV  12/21/20 19:00  100 mls





  INF SANDEE   Administration





  Protocol  





  Per Protocol  


 


Azithromycin 500 mg/ Sodium  250 mls @ 250 mls/hr  11/24/20 09:00  11/26/20 

09:46





  Chloride  IVPB   250 mls





  0900 SANDEE   Administration


 


Ceftriaxone Sodium 1 gm/  100 mls @ 200 mls/hr  11/24/20 09:00  11/26/20 08:06





  Sodium Chloride  IVPB   100 mls





  0900 SANDEE   Administration


 


Insulin Human Regular  0 units  11/21/20 17:45  11/22/20 20:36





  Insulin Regular 300 Units/3 Ml Vial  SC   3 units





  .BEDTIME SLIDING SC PRN   Administration





  Bedtime Correctional Scale  


 


Insulin Human Regular  0 units  11/21/20 19:07  11/24/20 04:21





  Insulin Regular 300 Units/3 Ml Vial  SC   3 unit





  .AGGRESSIVE SLIDING  PRN   Administration





  Aggressive Correctional Scale  


 


Labetalol HCl  10 mg  11/21/20 20:15  11/25/20 02:34





  Labetalol Hcl 100 Mg/20 Ml Vial  SLOW IVP   10 mg





  Q4H PRN   Administration





  Systolic BP > 180  


 


Lorazepam  2 mg  11/21/20 19:00  11/26/20 09:58





  Lorazepam 2 Mg/Ml Vial  SLOW IVP  12/21/20 19:00  2 mg





  Q1H PRN   Administration





  Breakthrough agitation  


 


Methylprednisolone Sodium Succinate  20 mg  11/23/20 09:00  11/26/20 08:07





  Methylprednisolone Sod Succ 40 Mg Vial  IVP   20 mg





  BID SANDEE   Administration


 


Multivitamins  1 tab  11/23/20 09:00  11/26/20 08:05





  Multivit, Therapeutic 1 Tab  PER TUBE   1 tab





  DAILY SANDEE   Administration


 


Nitroglycerin  0.5 inch  11/22/20 16:30  11/24/20 21:55





  Nitroglycerin 2% Ointment 1 Inch/1 Gm Packet  TOP   0.5 inch





  Q8H PRN   Administration





  SBP Greater Than 180  


 


Pantoprazole Sodium  40 mg  11/26/20 09:00  11/26/20 09:53





  Pantoprazole 40 Mg Granules Packet  PER TUBE   40 mg





  DAILY SANDEE   Administration


 


Propofol  1,000 mg  11/21/20 19:00  11/26/20 14:08





  Propofol 1,000 Mg/100 Ml Vial  IV  12/21/20 19:00  1,000 mg





  INF PRN   Administration





  TO ACHIEVE GOAL RASS  





  Protocol  














- Exam


General Appearance: ill appearing


General - other findings: sedate on mech vent


Eye: PERRL, anicteric sclera


ENT: normocephalic atraumatic, no oropharyngeal lesions


ENT - other findings: NGT in L nares, ETT in place


Neck: supple, symmetric, no JVD, no thyromegaly, no lymphadenopathy


Heart: RRR, no murmur, no gallops, no rubs, normal peripheral pulses


Heart - other findings: S1, S2


Respiratory - other findings: diminished bilat, few basilar crackles


Gastrointestinal: soft, non-tender, non-distended, normal bowel sounds, no pa

lpable masses


Gastrointestinal - other findings: obese


Extremities: no cyanosis, 1+ LE edema


Skin: normal turgor, no lesions


Neurological - other findings: sedate on mech vent


Psychiatric: somnolent, lethargic





Hosp A/P


(1) Acute respiratory failure with hypoxia


Code(s): J96.01 - ACUTE RESPIRATORY FAILURE WITH HYPOXIA   Status: Acute   


Plan: 


Continue mech ventilation SIMV, wean as clinically indicated








(2) Acute on chronic diastolic (congestive) heart failure


Code(s): I50.33 - ACUTE ON CHRONIC DIASTOLIC (CONGESTIVE) HEART FAILURE   

Status: Acute   


Plan: 


EF 50-55%, Lasix d/c'd due to GABBY








(3) Obesity hypoventilation syndrome


Code(s): E66.2 - MORBID (SEVERE) OBESITY WITH ALVEOLAR HYPOVENTILATION   Status:

Chronic   





(4) GABBY (acute kidney injury)


Code(s): N17.9 - ACUTE KIDNEY FAILURE, UNSPECIFIED   Status: Acute   


Plan: 


Slow improvement, avoid nephrotoxic meds and limit contrast, serial creatinine








(5) CKD (chronic kidney disease), stage III


Code(s): N18.3 - CHRONIC KIDNEY DISEASE, STAGE 3 (MODERATE) * DO NOT USE *   

Status: Acute   


Qualifiers: 


   Chronic kidney disease stage 3 subtype: stage 3a (GFR 45-59)   Qualified 

Code(s): N18.31 - Chronic kidney disease, stage 3a   





(6) DM type 2 (diabetes mellitus, type 2)


Status: Chronic   


Qualifiers: 


   Diabetes mellitus long term insulin use: with long term use   Diabetes 

mellitus complication status: with kidney complications   Diabetes mellitus 

complication detail: with chronic kidney disease   Chronic kidney disease stage:

stage 3 (moderate)   Qualified Code(s): E11.22 - Type 2 diabetes mellitus with 

diabetic chronic kidney disease; N18.3 - Chronic kidney disease, stage 3 

(moderate); Z79.4 - Long term (current) use of insulin   


Plan: 


Glucose trend lower, decrease Lantus 20u BID, accuchecks q6h, ISS








(7) Iron deficiency anemia


Code(s): D50.9 - IRON DEFICIENCY ANEMIA, UNSPECIFIED   Status: Chronic   


Qualifiers: 


   Iron deficiency anemia type: inadequate dietary iron intake   Qualified 

Code(s): D50.8 - Other iron deficiency anemias   


Plan: 


Worsening anemia, T&C for 1u PRBC's and transfuse today, serial H/H, no active 

blood loss noted, likely multifactorial including iron-deficiency








- Plan


continue antibiotics, , respiratory therapy, DVT proph w/SCDs





Continue critical support


T&C for 1u PRBC's today


IV Iron infusion today


Decrease Lantus 20u BID


Continue Zithromax/Rocephin


Protonix 40mg PT daily


Check stool guaiac


AM lab: BMP, CBC

## 2020-11-26 NOTE — PRG
DATE OF SERVICE:  11/26/2020



SUBJECTIVE:  Tabby Hart is morbidly obese female, remains intubated in the vent,

sedated.  She is moving all 4 extremities. 



OBJECTIVE:  VITAL SIGNS:  Maximal temperature is 99.7, pulse 96, blood pressure

190/80, on 40% saturations 92%, respirations 13.  I's and O's negative. 

CHEST:  Extensive rhonchi, crackles. 

CARDIAC:  Sinus tach. 

ABDOMEN:  Massive and soft.



LABORATORY DATA:  White count 11738, H and H are 6 and 21, platelet count 331.  PO2

was 84, pCO2 was 54, and pH 7.37.  Creatinine 2, BUN 58. 



ASSESSMENT:  Morbid obesity, respiratory failure, diastolic dysfunction, renal

failure, encephalopathy, CVA. 



She is not weanable at this stage, continue antibiotics.  Continue steroids,

supportive care. 



One-half hour of critical care time.







Job ID:  [id number]

## 2020-11-26 NOTE — PRG
DATE OF SERVICE:  11/26/2020



SUBJECTIVE:  The patient seen in ICU, intubated.



OBJECTIVE:  GENERAL:  This is a morbidly obese female, intubated. 

VITAL SIGNS:  Temperature 98.5, pulse 85, respiratory rate 18, blood pressure 
noted

HEENT:  Intubated. 

CV:  S1 and S2 heard. 

RESPIRATORY:  Clear. 

GI:  Abdomen is obese. 

MUSCULOSKELETAL:  2+ edema. 

NEUROLOGIC:  Intubated.



LABORATORY DATA:  Potassium 3.9, BUN is 58 from 50, creatinine is 2.4.



ASSESSMENT AND PLAN:  

1. Acute kidney injury on chronic kidney disease, stage 3.  Creatinine is 
better.

BUN may be high, most likely from steroids.  Adjust the steroids if tolerated. 

2. Acute hypoxic respiratory failure.

3. Cardiorenal syndrome.

4. Alkalosis.

5. Edema.

6. Hypokalemia, stable.

7. No diuretics, plan today.  We will monitor renal function.







Job ID:  [id number]



MTDD

## 2020-11-27 LAB
ANALYZER IN CARDIO: (no result)
ANION GAP SERPL CALC-SCNC: 16 MMOL/L (ref 10–20)
BASE EXCESS STD BLDA CALC-SCNC: 2.3 MEQ/L
BASOPHILS # BLD AUTO: 0 THOU/UL (ref 0–0.2)
BASOPHILS NFR BLD AUTO: 0.2 % (ref 0–1)
BUN SERPL-MCNC: 54 MG/DL (ref 9.8–20.1)
CA-I BLDA-SCNC: 1.19 MMOL/L (ref 1.12–1.3)
CALCIUM SERPL-MCNC: 9 MG/DL (ref 7.8–10.44)
CHLORIDE SERPL-SCNC: 101 MMOL/L (ref 98–107)
CO2 SERPL-SCNC: 30 MMOL/L (ref 22–29)
CREAT CL PREDICTED SERPL C-G-VRATE: 88 ML/MIN (ref 70–130)
EOSINOPHIL # BLD AUTO: 0.1 THOU/UL (ref 0–0.7)
EOSINOPHIL NFR BLD AUTO: 0.5 % (ref 0–10)
GLUCOSE SERPL-MCNC: 174 MG/DL (ref 70–105)
HCO3 BLDA-SCNC: 29.4 MEQ/L (ref 22–28)
HCT VFR BLDA CALC: 33 % (ref 36–47)
HGB BLD-MCNC: 8.6 G/DL (ref 12–16)
HGB BLDA-MCNC: 11.1 G/DL (ref 12–16)
LYMPHOCYTES # BLD: 0.8 THOU/UL (ref 1.2–3.4)
LYMPHOCYTES NFR BLD AUTO: 7 % (ref 21–51)
MCH RBC QN AUTO: 24.3 PG (ref 27–31)
MCV RBC AUTO: 78.8 FL (ref 78–98)
MONOCYTES # BLD AUTO: 0.7 THOU/UL (ref 0.11–0.59)
MONOCYTES NFR BLD AUTO: 6.1 % (ref 0–10)
NEUTROPHILS # BLD AUTO: 9.5 THOU/UL (ref 1.4–6.5)
NEUTROPHILS NFR BLD AUTO: 86.2 % (ref 42–75)
O2 A-A PPRESDIFF RESPIRATORY: 134.28 MMHG (ref 0–20)
PCO2 BLDA: 58.5 MMHG (ref 35–45)
PH BLDA: 7.32 [PH] (ref 7.35–7.45)
PLATELET # BLD AUTO: 346 THOU/UL (ref 130–400)
PO2 BLDA: 77.8 MMHG (ref 80–100)
POTASSIUM BLD-SCNC: 3.71 MMOL/L (ref 3.7–5.3)
POTASSIUM SERPL-SCNC: 3.9 MMOL/L (ref 3.5–5.1)
RBC # BLD AUTO: 3.52 MILL/UL (ref 4.2–5.4)
SODIUM SERPL-SCNC: 143 MMOL/L (ref 136–145)
SPECIMEN DRAWN FROM PATIENT: (no result)
WBC # BLD AUTO: 11 THOU/UL (ref 4.8–10.8)

## 2020-11-27 RX ADMIN — INSULIN GLARGINE SCH MLS: 100 INJECTION, SOLUTION SUBCUTANEOUS at 11:00

## 2020-11-27 RX ADMIN — ASPIRIN 81 MG CHEWABLE TABLET SCH MG: 81 TABLET CHEWABLE at 08:06

## 2020-11-27 RX ADMIN — INSULIN GLARGINE SCH MLS: 100 INJECTION, SOLUTION SUBCUTANEOUS at 20:08

## 2020-11-27 RX ADMIN — INSULIN HUMAN PRN UNIT: 100 INJECTION, SOLUTION PARENTERAL at 05:45

## 2020-11-27 RX ADMIN — CYANOCOBALAMIN TAB 1000 MCG SCH MCG: 1000 TAB at 10:50

## 2020-11-27 RX ADMIN — NITROGLYCERIN PRN INCH: 20 OINTMENT TOPICAL at 04:32

## 2020-11-27 RX ADMIN — PANTOPRAZOLE SODIUM SCH MG: 40 GRANULE, DELAYED RELEASE ORAL at 08:06

## 2020-11-27 RX ADMIN — AZITHROMYCIN SCH MLS: 500 INJECTION, POWDER, LYOPHILIZED, FOR SOLUTION INTRAVENOUS at 09:34

## 2020-11-27 RX ADMIN — INSULIN HUMAN PRN UNIT: 100 INJECTION, SOLUTION PARENTERAL at 18:21

## 2020-11-27 RX ADMIN — THERA TABS SCH TAB: TAB at 08:06

## 2020-11-27 RX ADMIN — CEFTRIAXONE SCH MLS: 1 INJECTION, POWDER, FOR SOLUTION INTRAMUSCULAR; INTRAVENOUS at 08:08

## 2020-11-27 NOTE — PDOC.HOSPP
- Subjective


Encounter Date: 11/27/20


Encounter Time: 14:15


Subjective: 


f/u for resp failure/diastolic HF/mech ventilation with SIMV/HTN. Nursing 

reports BP labile and trending upward. 





- Objective


Vital Signs & Weight: 


                             Vital Signs (12 hours)











  Temp Pulse Resp BP Pulse Ox


 


 11/27/20 11:02   75   


 


 11/27/20 10:00    20  


 


 11/27/20 08:07     185/97 H 


 


 11/27/20 08:03   76   185/97 H 


 


 11/27/20 08:00  98.5 F    


 


 11/27/20 06:54   91   


 


 11/27/20 06:52   85  14   99


 


 11/27/20 06:00    21 H  


 


 11/27/20 04:05   78   191/82 H 


 


 11/27/20 04:00  97.6 F   17  








                                     Weight











Admit Weight                   361 lb


 


Weight                         353 lb 2.888 oz











                            Most Recent Monitor Data











Heart Rate from ECG            78


 


NIBP                           187/92


 


NIBP BP-Mean                   123


 


Respiration from ECG           23


 


SpO2                           98














I&O: 


                                        











 11/26/20 11/27/20 11/28/20





 06:59 06:59 06:59


 


Intake Total 1308 3100 140


 


Output Total 1765 2445 350


 


Balance -457 565 -210











Result Diagrams: 


                                 11/27/20 04:00





                                 11/27/20 04:00


Additional Labs: 


                                   Accuchecks











  11/27/20 11/27/20 11/26/20





  11:07 00:16 21:07


 


POC Glucose  131 H  138 H  102 H














  11/26/20





  17:23


 


POC Glucose  137 H








Microbiology





11/30/19 11:40   Stool   Stool Occult Blood (TAWANDA) - Final


11/21/20 14:54   Urine malagon catheter   Urine Culture - Final


                              NO GROWTH AT 48 HOURS





                                Laboratory Tests











  11/30/19 11/30/19 12/01/19





  05:13 13:25 03:51


 


WBC  13.2 H  


 


Hgb  8.8 L  


 


Neutrophils %  91.6 H   74.9


 


Potassium   5.3 H 


 


Carbon Dioxide   


 


BUN   42 H 


 


Creatinine   1.83 H 


 


Iron   


 


Ferritin   


 


TSH 3rd Generation   


 


SARS-CoV-2 Rap RNA(RT-PCR)   














  11/21/20 11/21/20 11/22/20





  16:00 22:06 04:05


 


WBC   


 


Hgb   


 


Neutrophils %   


 


Potassium   


 


Carbon Dioxide   


 


BUN   


 


Creatinine   


 


Iron    11 L


 


Ferritin   


 


TSH 3rd Generation   1.1967 


 


SARS-CoV-2 Rap RNA(RT-PCR)  Not Detected  














  11/22/20 11/22/20 11/23/20





  04:05 11:02 03:55


 


WBC   11.2 H 


 


Hgb   7.0 L 


 


Neutrophils %   


 


Potassium   


 


Carbon Dioxide   


 


BUN   


 


Creatinine    2.27 H


 


Iron   


 


Ferritin  127.01  


 


TSH 3rd Generation   


 


SARS-CoV-2 Rap RNA(RT-PCR)   














  11/23/20 11/24/20 11/24/20





  03:55 03:20 03:30


 


WBC  10.4  13.7 H 


 


Hgb  6.8 L  7.4 L 


 


Neutrophils %   


 


Potassium   


 


Carbon Dioxide   


 


BUN   


 


Creatinine    2.67 H


 


Iron   


 


Ferritin   


 


TSH 3rd Generation   


 


SARS-CoV-2 Rap RNA(RT-PCR)   














  11/25/20 11/25/20 11/26/20





  04:35 04:35 03:55


 


WBC   13.6 H 


 


Hgb   7.2 L 


 


Neutrophils %   


 


Potassium   


 


Carbon Dioxide    30 H


 


BUN    58 H


 


Creatinine  2.72 H   2.46 H


 


Iron   


 


Ferritin   


 


TSH 3rd Generation   


 


SARS-CoV-2 Rap RNA(RT-PCR)   














  11/26/20 11/27/20





  03:55 04:00


 


WBC  


 


Hgb  6.5 L 


 


Neutrophils %  83.9 H  86.2 H


 


Potassium  


 


Carbon Dioxide  


 


BUN  


 


Creatinine  


 


Iron  


 


Ferritin  


 


TSH 3rd Generation  


 


SARS-CoV-2 Rap RNA(RT-PCR)  











EKG Reviewed by me: Yes (Tele - SR)





Hospitalist ROS





- Medication


Medications: 


Active Medications











Generic Name Dose Route Start Last Admin





  Trade Name Freq  PRN Reason Stop Dose Admin


 


Albuterol/Ipratropium  3 ml  11/21/20 19:00  11/27/20 06:52





  Ipratropium/Albuterol Sulfate 3 Ml Neb  NEB   3 ml





  [id number] SANDEE   Administration


 


Amlodipine Besylate  5 mg  11/25/20 09:00  11/27/20 08:03





  Amlodipine 5 Mg Tab  PO   5 mg





  BID SANDEE   Administration


 


Aspirin  81 mg  11/22/20 09:00  11/27/20 08:06





  Aspirin Chewable 81 Mg Tab  PO   81 mg





  DAILY SANDEE   Administration


 


Bisacodyl  10 mg  11/21/20 17:39  11/23/20 05:33





  Bisacodyl 10 Mg Supp  IA   10 mg





  DAILYPRN PRN   Administration





  Constipation  


 


Cyanocobalamin  1,000 mcg  11/23/20 09:00  11/27/20 10:50





  Cyanocobalamin (Vitamin B-12) 1,000 Mcg Tab  PER TUBE   1,000 mcg





  DAILY SANDEE   Administration


 


Enoxaparin Sodium  30 mg  11/24/20 21:00  11/26/20 21:29





  Enoxaparin Sodium 30 Mg/0.3 Ml Syringe  SC   30 mg





  2100 SANDEE   Administration


 


Folic Acid  1 mg  11/23/20 09:00  11/27/20 08:07





  Folic Acid 1 Mg Tab  PER TUBE   1 mg





  DAILY SANDEE   Administration


 


Fentanyl Citrate 2,000 mcg/  100 mls @ 0 mls/hr  11/21/20 19:00  11/25/20 00:28





  Sodium Chloride  IV  12/21/20 19:00  100 mls





  INF SANDEE   Administration





  Protocol  





  Per Protocol  


 


Azithromycin 500 mg/ Sodium  250 mls @ 250 mls/hr  11/24/20 09:00  11/27/20 

09:34





  Chloride  IVPB   250 mls





  0900 SANDEE   Administration


 


Ceftriaxone Sodium 1 gm/  100 mls @ 200 mls/hr  11/24/20 09:00  11/27/20 08:08





  Sodium Chloride  IVPB   100 mls





  0900 SANDEE   Administration


 


Insulin Glargine 20 units/  0.2 mls @ 0.1 mls/hr  11/26/20 21:00  11/27/20 11:00





  Miscellaneous Medication  SC   0.2 mls





  Q12HR SANDEE   Administration


 


Insulin Human Regular  0 units  11/21/20 17:45  11/22/20 20:36





  Insulin Regular 300 Units/3 Ml Vial  SC   3 units





  .BEDTIME SLIDING SC PRN   Administration





  Bedtime Correctional Scale  


 


Insulin Human Regular  0 units  11/21/20 19:07  11/27/20 05:45





  Insulin Regular 300 Units/3 Ml Vial  SC   3 unit





  .AGGRESSIVE SLIDING  PRN   Administration





  Aggressive Correctional Scale  


 


Labetalol HCl  10 mg  11/21/20 20:15  11/25/20 02:34





  Labetalol Hcl 100 Mg/20 Ml Vial  SLOW IVP   10 mg





  Q4H PRN   Administration





  Systolic BP > 180  


 


Lorazepam  2 mg  11/21/20 19:00  11/27/20 11:51





  Lorazepam 2 Mg/Ml Vial  SLOW IVP  12/21/20 19:00  2 mg





  Q1H PRN   Administration





  Breakthrough agitation  


 


Methylprednisolone Sodium Succinate  20 mg  11/23/20 09:00  11/27/20 09:36





  Methylprednisolone Sod Succ 40 Mg Vial  IVP   20 mg





  BID SANDEE   Administration


 


Multivitamins  1 tab  11/23/20 09:00  11/27/20 08:06





  Multivit, Therapeutic 1 Tab  PER TUBE   1 tab





  DAILY SANDEE   Administration


 


Nitroglycerin  0.5 inch  11/22/20 16:30  11/27/20 04:32





  Nitroglycerin 2% Ointment 1 Inch/1 Gm Packet  TOP   0.5 inch





  Q8H PRN   Administration





  SBP Greater Than 180  


 


Pantoprazole Sodium  40 mg  11/26/20 09:00  11/27/20 08:06





  Pantoprazole 40 Mg Granules Packet  PER TUBE   40 mg





  DAILY SANDEE   Administration


 


Propofol  1,000 mg  11/21/20 19:00  11/27/20 13:35





  Propofol 1,000 Mg/100 Ml Vial  IV  12/21/20 19:00  1,000 mg





  INF PRN   Administration





  TO ACHIEVE GOAL RASS  





  Protocol  














- Exam


General - other findings: sedate on mech vent


Eye: PERRL, anicteric sclera


ENT: normocephalic atraumatic, no oropharyngeal lesions


ENT - other findings: ETT/NGT in place


Neck: supple, symmetric, no JVD, no thyromegaly, no lymphadenopathy


Heart: RRR, no gallops, no rubs, normal peripheral pulses


Heart - other findings: S1, S2


Respiratory: tachypneic


Respiratory - other findings: diminished in bases bilat


Gastrointestinal: soft, non-distended, normal bowel sounds


Gastrointestinal - other findings: obese


Extremities: no cyanosis, no clubbing, 1+ LE edema


Skin: normal turgor, no lesions


Neurological - other findings: sedate on mech vent


Musculoskeletal: generalized weakness


Psychiatric: somnolent, lethargic





Hosp A/P


(1) Acute respiratory failure with hypoxia


Code(s): J96.01 - ACUTE RESPIRATORY FAILURE WITH HYPOXIA   Status: Acute   


Plan: 


Continue SIMV 40% FIO2, rate 8, general pulmonary support








(2) Acute on chronic diastolic (congestive) heart failure


Code(s): I50.33 - ACUTE ON CHRONIC DIASTOLIC (CONGESTIVE) HEART FAILURE   

Status: Acute   


Plan: 


Lasix 40mg IV BID, give Lasix 40mg IV x 1 now, serial I/O's, daily weight








(3) Obesity hypoventilation syndrome


Code(s): E66.2 - MORBID (SEVERE) OBESITY WITH ALVEOLAR HYPOVENTILATION   Status:

Chronic   





(4) GABBY (acute kidney injury)


Code(s): N17.9 - ACUTE KIDNEY FAILURE, UNSPECIFIED   Status: Acute   


Plan: 


Improving, avoid nephrotoxic meds and limit contrast exposure








(5) CKD (chronic kidney disease), stage III


Code(s): N18.3 - CHRONIC KIDNEY DISEASE, STAGE 3 (MODERATE) * DO NOT USE *   

Status: Acute   


Qualifiers: 


   Chronic kidney disease stage 3 subtype: stage 3a (GFR 45-59)   Qualified 

Code(s): N18.31 - Chronic kidney disease, stage 3a   





(6) DM type 2 (diabetes mellitus, type 2)


Status: Chronic   


Qualifiers: 


   Diabetes mellitus long term insulin use: with long term use   Diabetes 

mellitus complication status: with kidney complications   Diabetes mellitus 

complication detail: with chronic kidney disease   Chronic kidney disease stage:

stage 3 (moderate)   Qualified Code(s): E11.22 - Type 2 diabetes mellitus with 

diabetic chronic kidney disease; N18.3 - Chronic kidney disease, stage 3 

(moderate); Z79.4 - Long term (current) use of insulin   


Plan: 


Glucose trend stable, continue serial accuchecks, ISS








(7) Iron deficiency anemia


Code(s): D50.9 - IRON DEFICIENCY ANEMIA, UNSPECIFIED   Status: Chronic   


Qualifiers: 


   Iron deficiency anemia type: inadequate dietary iron intake   Qualified 

Code(s): D50.8 - Other iron deficiency anemias   


Plan: 


s/p IV Iron infusion, resume FeSO4








- Plan


continue antibiotics, PT/OT, , speech therapy, respiratory 

therapy, DVT proph w/SCDs





Continue critical support


s/p 1u PRBC's


s/p IV Iron infusion 


Decrease Lantus 20u BID


Continue Zithromax/Rocephin


Protonix 40mg PT daily


Lasix 40mg IV BID


Hydralazine 20mg IV q4h PRN 


Check stool guaiac


AM lab: BMP, CBC, ABG

## 2020-11-27 NOTE — PRG
DATE OF SERVICE:  11/27/2020



37 minutes critical care time.



SUBJECTIVE:  The patient remains intubated on mechanical ventilation.  She will wake

up, but does not follow commands for me specifically. 



OBJECTIVE:  VITAL SIGNS:  Temperature 97.6, pulse 83, blood pressure 180/91.

24-hour intake 3100, output 2445.  Weight 353 pounds. 

HEENT:  Unremarkable. 

NECK:  No JVD. 

LUNGS:  Clear anteriorly. 

CARDIOVASCULAR:  S1, S2.  Regular. 

ABDOMEN:  Soft, obese, nontender, nondistended. 

EXTREMITIES:  Edematous.



LABORATORY DATA:  PH 7.32, pCO2 of 58, PO2 of 77, on SIMV rate of 8, tidal volume

450, PEEP 5, pressure support 10, FiO2 of 40%.  White blood cell count 11,

hematocrit 27.8, and platelet count 346.  Sodium 143, potassium 3.9, chloride 101,

CO2 of 30, BUN 54, creatinine 1.8, glucose 174. 



ASSESSMENT:  

1. Acute respiratory failure requiring mechanical ventilation.

2. Diastolic heart dysfunction.

3. Cerebrovascular accident.

4. Cephalopathy.

5. Renal insufficiency.



PLAN:  

1. She is not weanable at this time.  I tried her on spontaneous breathing for a

while, but she cannot pull over 200 mL tidal volume. 

2. We are facing the prospect of having to do tracheostomy on this patient next

week.  I do not see a scenario when she is going to be weanable without a trach.

The 

alternative would be to get palliative care involved.

3. Rosa M today.







Job ID:  [id number]

## 2020-11-27 NOTE — PRG
DATE OF SERVICE:  11/27/2020



SUBJECTIVE:  The patient is seen in ICU, intubated.  Apparently, she remains not
weanable at this point.

Pulmonology is following. 



OBJECTIVE:  GENERAL:  This is a morbidly obese female, intubated. 

VITAL SIGNS:  Temperature 97.6, pulse 75, respiratory rate 14, and blood 
pressure noted. 

HEENT:  Intubated. 

CV:  S1 and S2 heard. 

RESPIRATORY:  Clear. 

GASTROINTESTINAL:  Abdomen is obese. 

MUSCULOSKELETAL:  2+ edema.



LABORATORY DATA:  Potassium 3.9, BUN is 54, and creatinine is 1.85.



ASSESSMENT AND PLAN:  

1. Acute kidney injury on chronic kidney disease, stable, but the patient 
remains

fluid overloaded, could not tolerate Lasix much. 

2. Alkalosis.

3. Acute hypoxic respiratory failure.

4. Cardiorenal syndrome.

5. Edema.

6. Hyperkalemia. 



Renal function getting better.  Avoid nephrotoxins.







Job ID:  [id number]



MTDD

## 2020-11-28 LAB
ANALYZER IN CARDIO: (no result)
ANION GAP SERPL CALC-SCNC: 12 MMOL/L (ref 10–20)
BASE EXCESS STD BLDA CALC-SCNC: 4.2 MEQ/L
BASOPHILS # BLD AUTO: 0 THOU/UL (ref 0–0.2)
BASOPHILS NFR BLD AUTO: 0.3 % (ref 0–1)
BUN SERPL-MCNC: 48 MG/DL (ref 9.8–20.1)
CA-I BLDA-SCNC: 1.21 MMOL/L (ref 1.12–1.3)
CALCIUM SERPL-MCNC: 8.9 MG/DL (ref 7.8–10.44)
CHLORIDE SERPL-SCNC: 103 MMOL/L (ref 98–107)
CO2 SERPL-SCNC: 32 MMOL/L (ref 22–29)
CREAT CL PREDICTED SERPL C-G-VRATE: 107 ML/MIN (ref 70–130)
EOSINOPHIL # BLD AUTO: 0.2 THOU/UL (ref 0–0.7)
EOSINOPHIL NFR BLD AUTO: 1.5 % (ref 0–10)
GLUCOSE SERPL-MCNC: 136 MG/DL (ref 70–105)
HCO3 BLDA-SCNC: 29 MEQ/L (ref 22–28)
HCT VFR BLDA CALC: 28 % (ref 36–47)
HGB BLD-MCNC: 8 G/DL (ref 12–16)
HGB BLDA-MCNC: 9.5 G/DL (ref 12–16)
LYMPHOCYTES # BLD: 1.1 THOU/UL (ref 1.2–3.4)
LYMPHOCYTES NFR BLD AUTO: 9.2 % (ref 21–51)
MAGNESIUM SERPL-MCNC: 1.8 MG/DL (ref 1.6–2.6)
MCH RBC QN AUTO: 24.4 PG (ref 27–31)
MCV RBC AUTO: 79.5 FL (ref 78–98)
MONOCYTES # BLD AUTO: 0.7 THOU/UL (ref 0.11–0.59)
MONOCYTES NFR BLD AUTO: 5.7 % (ref 0–10)
NEUTROPHILS # BLD AUTO: 9.7 THOU/UL (ref 1.4–6.5)
NEUTROPHILS NFR BLD AUTO: 83.3 % (ref 42–75)
O2 A-A PPRESDIFF RESPIRATORY: 174.5 MMHG (ref 0–20)
PCO2 BLDA: 44.8 MMHG (ref 35–45)
PH BLDA: 7.43 [PH] (ref 7.35–7.45)
PLATELET # BLD AUTO: 358 THOU/UL (ref 130–400)
PO2 BLDA: 54.7 MMHG (ref 80–100)
POTASSIUM BLD-SCNC: 3.45 MMOL/L (ref 3.7–5.3)
POTASSIUM SERPL-SCNC: 3.3 MMOL/L (ref 3.5–5.1)
POTASSIUM SERPL-SCNC: 3.9 MMOL/L (ref 3.5–5.1)
RBC # BLD AUTO: 3.26 MILL/UL (ref 4.2–5.4)
SODIUM SERPL-SCNC: 144 MMOL/L (ref 136–145)
SPECIMEN DRAWN FROM PATIENT: (no result)
WBC # BLD AUTO: 11.7 THOU/UL (ref 4.8–10.8)

## 2020-11-28 RX ADMIN — CEFTRIAXONE SCH MLS: 1 INJECTION, POWDER, FOR SOLUTION INTRAMUSCULAR; INTRAVENOUS at 09:11

## 2020-11-28 RX ADMIN — PANTOPRAZOLE SODIUM SCH MG: 40 GRANULE, DELAYED RELEASE ORAL at 07:59

## 2020-11-28 RX ADMIN — CYANOCOBALAMIN TAB 1000 MCG SCH MCG: 1000 TAB at 08:00

## 2020-11-28 RX ADMIN — AZITHROMYCIN SCH MLS: 500 INJECTION, POWDER, LYOPHILIZED, FOR SOLUTION INTRAVENOUS at 09:53

## 2020-11-28 RX ADMIN — NITROGLYCERIN SCH INCH: 20 OINTMENT TOPICAL at 15:18

## 2020-11-28 RX ADMIN — INSULIN GLARGINE SCH MLS: 100 INJECTION, SOLUTION SUBCUTANEOUS at 21:24

## 2020-11-28 RX ADMIN — NITROGLYCERIN SCH INCH: 20 OINTMENT TOPICAL at 22:25

## 2020-11-28 RX ADMIN — ASPIRIN 81 MG CHEWABLE TABLET SCH MG: 81 TABLET CHEWABLE at 07:58

## 2020-11-28 RX ADMIN — THERA TABS SCH TAB: TAB at 07:59

## 2020-11-28 RX ADMIN — INSULIN GLARGINE SCH MLS: 100 INJECTION, SOLUTION SUBCUTANEOUS at 09:22

## 2020-11-28 NOTE — PDOC.HOSPP
- Subjective


Encounter Date: 11/28/20


Encounter Time: 11:00


non-verbal


Subjective: 


no events overnight, BP has been running high and needing prn labetalol.





- Objective


Vital Signs & Weight: 


                             Vital Signs (12 hours)











  Temp Pulse Resp BP Pulse Ox


 


 11/28/20 08:16  97.7 F    


 


 11/28/20 08:00    23 H  


 


 11/28/20 07:59   77   175/74 H 


 


 11/28/20 07:03   77   175/74 H 


 


 11/28/20 06:56   78  19   99


 


 11/28/20 02:38   81   186/88 H 


 


 11/28/20 02:21   80   180/83 H 


 


 11/28/20 00:00  99.0 F    


 


 11/27/20 23:33   72   141/65 H 








                                     Weight











Admit Weight                   361 lb


 


Weight                         350 lb 5.032 oz











                            Most Recent Monitor Data











Heart Rate from ECG            84


 


NIBP                           189/86


 


NIBP BP-Mean                   120


 


Respiration from ECG           21


 


SpO2                           95














I&O: 


                                        











 11/27/20 11/28/20 11/29/20





 06:59 06:59 06:59


 


Intake Total 3100 1818 60


 


Output Total 2445 3280 60


 


Balance 655 -1462 0











Result Diagrams: 


                                 11/28/20 04:20





                                 11/28/20 04:20


Additional Labs: 


                                   Accuchecks











  11/28/20 11/27/20





  00:37 11:07


 


POC Glucose  109 H  131 H














Hospitalist ROS





- Review of Systems


ROS unobtainable: due to endotracheal tube





- Medication


Medications: 


Active Medications











Generic Name Dose Route Start Last Admin





  Trade Name Freq  PRN Reason Stop Dose Admin


 


Acetaminophen  650 mg  11/21/20 17:39  11/27/20 14:13





  Acetaminophen 650 Mg/20.3 Ml Udcup  PO   650 mg





  Q6H PRN   Administration





  Fever > 101 or Mild Pain  


 


Albuterol/Ipratropium  3 ml  11/21/20 19:00  11/28/20 06:56





  Ipratropium/Albuterol Sulfate 3 Ml Neb  NEB   3 ml





  [id number] SANDEE   Administration


 


Amlodipine Besylate  5 mg  11/25/20 09:00  11/28/20 07:59





  Amlodipine 5 Mg Tab  PO   5 mg





  BID SANDEE   Administration


 


Aspirin  81 mg  11/22/20 09:00  11/28/20 07:58





  Aspirin Chewable 81 Mg Tab  PO   81 mg





  DAILY SANDEE   Administration


 


Bisacodyl  10 mg  11/21/20 17:39  11/23/20 05:33





  Bisacodyl 10 Mg Supp  LA   10 mg





  DAILYPRN PRN   Administration





  Constipation  


 


Carvedilol  25 mg  11/27/20 17:00  11/28/20 07:58





  Carvedilol 25 Mg Tab  PO   25 mg





  BID-WM SANDEE   Administration


 


Cyanocobalamin  1,000 mcg  11/23/20 09:00  11/28/20 08:00





  Cyanocobalamin (Vitamin B-12) 1,000 Mcg Tab  PER TUBE   1,000 mcg





  DAILY SANDEE   Administration


 


Enoxaparin Sodium  30 mg  11/24/20 21:00  11/27/20 20:07





  Enoxaparin Sodium 30 Mg/0.3 Ml Syringe  SC   30 mg





  2100 SANDEE   Administration


 


Folic Acid  1 mg  11/23/20 09:00  11/28/20 07:59





  Folic Acid 1 Mg Tab  PER TUBE   1 mg





  DAILY SANDEE   Administration


 


Furosemide  40 mg  11/27/20 21:00  11/28/20 09:11





  Furosemide 40 Mg/4 Ml Vial  SLOW IVP   40 mg





  BID SANDEE   Administration


 


Hydralazine HCl  20 mg  11/27/20 14:13  11/28/20 02:38





  Hydralazine 20 Mg/Ml Vial  SLOW IVP   20 mg





  Q4H PRN   Administration





  SBP Greater Than 180  


 


Fentanyl Citrate 2,000 mcg/  100 mls @ 0 mls/hr  11/21/20 19:00  11/25/20 00:28





  Sodium Chloride  IV  12/21/20 19:00  100 mls





  INF SANDEE   Administration





  Protocol  





  Per Protocol  


 


Azithromycin 500 mg/ Sodium  250 mls @ 250 mls/hr  11/24/20 09:00  11/28/20 

09:53





  Chloride  IVPB   250 mls





  0900 SANDEE   Administration


 


Ceftriaxone Sodium 1 gm/  100 mls @ 200 mls/hr  11/24/20 09:00  11/28/20 09:11





  Sodium Chloride  IVPB   100 mls





  0900 SANDEE   Administration


 


Insulin Glargine 20 units/  0.2 mls @ 0.1 mls/hr  11/26/20 21:00  11/28/20 09:22





  Miscellaneous Medication  SC   0.2 mls





  Q12HR SANDEE   Administration


 


Potassium Chloride 40 meq/  100 mls @ 25 mls/hr  11/28/20 09:45  11/28/20 09:56





  Device  IVPB  11/28/20 13:44  100 mls





  NOW SANDEE   Administration


 


Insulin Human Regular  0 units  11/21/20 17:45  11/22/20 20:36





  Insulin Regular 300 Units/3 Ml Vial  SC   3 units





  .BEDTIME SLIDING SC PRN   Administration





  Bedtime Correctional Scale  


 


Insulin Human Regular  0 units  11/21/20 19:07  11/27/20 18:21





  Insulin Regular 300 Units/3 Ml Vial  SC   3 unit





  .AGGRESSIVE SLIDING  PRN   Administration





  Aggressive Correctional Scale  


 


Labetalol HCl  10 mg  11/21/20 20:15  11/25/20 02:34





  Labetalol Hcl 100 Mg/20 Ml Vial  SLOW IVP   10 mg





  Q4H PRN   Administration





  Systolic BP > 180  


 


Lorazepam  2 mg  11/21/20 19:00  11/28/20 01:42





  Lorazepam 2 Mg/Ml Vial  SLOW IVP  12/21/20 19:00  2 mg





  Q1H PRN   Administration





  Breakthrough agitation  


 


Methylprednisolone Sodium Succinate  20 mg  11/23/20 09:00  11/28/20 09:14





  Methylprednisolone Sod Succ 40 Mg Vial  IVP   20 mg





  BID SANDEE   Administration


 


Multivitamins  1 tab  11/23/20 09:00  11/28/20 07:59





  Multivit, Therapeutic 1 Tab  PER TUBE   1 tab





  DAILY SANDEE   Administration


 


Nitroglycerin  0.5 inch  11/22/20 16:30  11/27/20 04:32





  Nitroglycerin 2% Ointment 1 Inch/1 Gm Packet  TOP   0.5 inch





  Q8H PRN   Administration





  SBP Greater Than 180  


 


Pantoprazole Sodium  40 mg  11/26/20 09:00  11/28/20 07:59





  Pantoprazole 40 Mg Granules Packet  PER TUBE   40 mg





  DAILY SANDEE   Administration


 


Propofol  1,000 mg  11/21/20 19:00  11/28/20 06:28





  Propofol 1,000 Mg/100 Ml Vial  IV  12/21/20 19:00  1,000 mg





  INF PRN   Administration





  TO ACHIEVE GOAL RASS  





  Protocol  


 


Venlafaxine HCl  75 mg  11/28/20 09:00  11/28/20 07:58





  Venlafaxine Hcl 75 Mg Tab  PO   75 mg





  DAILY SANDEE   Administration














- Exam


General - other findings: sedated on vent


ENT: moist mucosa


Heart: RRR, no murmur, no gallops, no rubs


Respiratory: CTAB, no wheezes, no rales, no ronchi


Gastrointestinal: soft, non-tender, non-distended, normal bowel sounds


Extremities - other findings: not moving left arm or leg


Psychiatric - other findings: sedated on the vent





Hosp A/P





- Plan





(1) Acute respiratory failure with hypoxia


Code(s): J96.01 - ACUTE RESPIRATORY FAILURE WITH HYPOXIA   Status: Acute   


Plan: 


Continue Vent, not able to wean, possible trach next week








(2) Acute on chronic diastolic (congestive) heart failure


Code(s): I50.33 - ACUTE ON CHRONIC DIASTOLIC (CONGESTIVE) HEART FAILURE   

Status: Acute   


Plan: 


Lasix 40mg IV BID, give Lasix 40mg IV x 1 now, serial I/O's, daily weight








(3) Obesity hypoventilation syndrome


Code(s): E66.2 - MORBID (SEVERE) OBESITY WITH ALVEOLAR HYPOVENTILATION   Status:

Chronic   





(4) GABBY (acute kidney injury)


Code(s): N17.9 - ACUTE KIDNEY FAILURE, UNSPECIFIED   Status: Acute   


Plan: 


Improving, avoid nephrotoxic meds and limit contrast exposure








(5) CKD (chronic kidney disease), stage III


Code(s): N18.3 - CHRONIC KIDNEY DISEASE, STAGE 3 (MODERATE) * DO NOT USE *   St

atus: Acute   


Qualifiers: 


   Chronic kidney disease stage 3 subtype: stage 3a (GFR 45-59)   Qualified 

Code(s): N18.31 - Chronic kidney disease, stage 3a   





(6) DM type 2 (diabetes mellitus, type 2)


Status: Chronic   


Qualifiers: 


   Diabetes mellitus long term insulin use: with long term use   Diabetes 

mellitus complication status: with kidney complications   Diabetes mellitus 

complication detail: with chronic kidney disease   Chronic kidney disease stage:

stage 3 (moderate)   Qualified Code(s): E11.22 - Type 2 diabetes mellitus with 

diabetic chronic kidney disease; N18.3 - Chronic kidney disease, stage 3 

(moderate); Z79.4 - Long term (current) use of insulin   


Plan: 


Glucose trend stable, continue serial accuchecks, ISS








(7) Iron deficiency anemia


Code(s): D50.9 - IRON DEFICIENCY ANEMIA, UNSPECIFIED   Status: Chronic   


Qualifiers: 


   Iron deficiency anemia type: inadequate dietary iron intake   Qualified 

Code(s): D50.8 - Other iron deficiency anemias   


Plan: 


s/p IV Iron infusion, resume FeSO4








Continue critical support


s/p 1u PRBC's


s/p IV Iron infusion 


Decreased Lantus 20u BID


Continue Zithromax/Rocephin


Protonix 40mg PT daily


Lasix 40mg IV BID


Hydralazine 20mg IV q4h PRN 


Check stool guaiac


Palliative care consult


AM lab: BMP, CBC, ABG

## 2020-11-28 NOTE — PRG
DATE OF SERVICE:  11/28/2020



SUBJECTIVE:  The patient was seen and examined in ICU and remains intubated.



OBJECTIVE:  GENERAL:  This is a morbidly obese female seen in the ICU and intubated. 

VITAL SIGNS:  Temperature 97.7, pulse 84, respiratory rate 20, blood pressure

189/86. 

HEENT:  Intubated. 

CV:  S1, S2 heard. 

RESPIRATORY:  Clear. 

GI:  Abdomen is obese. 

MUSCULOSKELETAL:  2+ edema. 

NEUROLOGICAL:  Intubated.



LABORATORY DATA:  Potassium 3.3, BUN is 48, creatinine is 1.5.



ASSESSMENT:  

1. Acute kidney injury on chronic kidney disease, stage 3.  Renal function is better.

2. Hypokalemia.  We will replace and recheck magnesium and phosphorus with potassium

in the evening.  Bedside nurse updated. 

3. Acute hypoxic respiratory failure.

4. Alkalosis.

5. Cardiorenal syndrome.

6. Edema.



PLAN:  Cautious replacement of electrolytes with close monitoring of

cardiorespiratory status.  Patient currently on diuretics, but renal function is

getting better.  She remains fluid overloaded.  We will follow.  Appreciate the

consult. 







Job ID:  [id number]

## 2020-11-28 NOTE — PRG
DATE OF SERVICE:  11/28/2020



SUBJECTIVE:  Ms. Hart remains mechanically ventilated.



OBJECTIVE:  VITAL SIGNS:  Heart rates in the 80s, respiratory rates in the teens,

oximetry is 99, blood pressure 180/72. 

LUNGS:  Remarkable for coarse equal breath sounds. 

HEART:  Regular rhythm. 

ABDOMEN:  Soft. 

EXTREMITIES:  With edema.



LABORATORY DATA:  White count 11.7; hemoglobin 8.0, yesterday was 8.6; platelets

358.  Potassium 3.3; BUN 48; creatinine 1.5, yesterday creatinine is 1.8.  PH 7.43,

CO2 of 44, and pO2 of 54. 



IMPRESSION:  

1. Diastolic heart failure.

2. Respiratory failure.

3. Acute on chronic kidney disease.

4. CVA.

5. Encephalopathy. 



She goes from having pressure support, tidal volume of 100 mL, sometimes 300 or 400

mL, but the majority of them at 200 mL or less. 



I suspect Dr. Barajas has write about the tracheostomy.







Job ID:  [id number]

## 2020-11-29 LAB
ANALYZER IN CARDIO: (no result)
ANION GAP SERPL CALC-SCNC: 17 MMOL/L (ref 10–20)
BASE EXCESS STD BLDA CALC-SCNC: 4.5 MEQ/L
BASOPHILS # BLD AUTO: 0 THOU/UL (ref 0–0.2)
BASOPHILS NFR BLD AUTO: 0.2 % (ref 0–1)
BUN SERPL-MCNC: 49 MG/DL (ref 9.8–20.1)
CA-I BLDA-SCNC: 1.27 MMOL/L (ref 1.12–1.3)
CALCIUM SERPL-MCNC: 9.7 MG/DL (ref 7.8–10.44)
CHLORIDE SERPL-SCNC: 101 MMOL/L (ref 98–107)
CO2 SERPL-SCNC: 30 MMOL/L (ref 22–29)
CREAT CL PREDICTED SERPL C-G-VRATE: 104 ML/MIN (ref 70–130)
EOSINOPHIL # BLD AUTO: 0.3 THOU/UL (ref 0–0.7)
EOSINOPHIL NFR BLD AUTO: 2 % (ref 0–10)
GLUCOSE SERPL-MCNC: 167 MG/DL (ref 70–105)
HCO3 BLDA-SCNC: 30.3 MEQ/L (ref 22–28)
HCT VFR BLDA CALC: 30 % (ref 36–47)
HGB BLD-MCNC: 8.9 G/DL (ref 12–16)
HGB BLDA-MCNC: 10.3 G/DL (ref 12–16)
LYMPHOCYTES # BLD: 1 THOU/UL (ref 1.2–3.4)
LYMPHOCYTES NFR BLD AUTO: 7.4 % (ref 21–51)
MCH RBC QN AUTO: 24 PG (ref 27–31)
MCV RBC AUTO: 80.2 FL (ref 78–98)
MONOCYTES # BLD AUTO: 0.7 THOU/UL (ref 0.11–0.59)
MONOCYTES NFR BLD AUTO: 5 % (ref 0–10)
NEUTROPHILS # BLD AUTO: 11.5 THOU/UL (ref 1.4–6.5)
NEUTROPHILS NFR BLD AUTO: 85.5 % (ref 42–75)
PCO2 BLDA: 51.4 MMHG (ref 35–45)
PH BLDA: 7.39 [PH] (ref 7.35–7.45)
PLATELET # BLD AUTO: 414 THOU/UL (ref 130–400)
PO2 BLDA: 90.7 MMHG (ref 80–100)
POTASSIUM BLD-SCNC: 3.53 MMOL/L (ref 3.7–5.3)
POTASSIUM SERPL-SCNC: 4.1 MMOL/L (ref 3.5–5.1)
RBC # BLD AUTO: 3.71 MILL/UL (ref 4.2–5.4)
SODIUM SERPL-SCNC: 144 MMOL/L (ref 136–145)
SPECIMEN DRAWN FROM PATIENT: (no result)
WBC # BLD AUTO: 13.5 THOU/UL (ref 4.8–10.8)

## 2020-11-29 RX ADMIN — INSULIN GLARGINE SCH MLS: 100 INJECTION, SOLUTION SUBCUTANEOUS at 08:39

## 2020-11-29 RX ADMIN — INSULIN GLARGINE SCH MLS: 100 INJECTION, SOLUTION SUBCUTANEOUS at 20:08

## 2020-11-29 RX ADMIN — THERA TABS SCH TAB: TAB at 07:50

## 2020-11-29 RX ADMIN — NITROGLYCERIN SCH INCH: 20 OINTMENT TOPICAL at 22:49

## 2020-11-29 RX ADMIN — CYANOCOBALAMIN TAB 1000 MCG SCH MCG: 1000 TAB at 07:50

## 2020-11-29 RX ADMIN — NITROGLYCERIN SCH INCH: 20 OINTMENT TOPICAL at 07:44

## 2020-11-29 RX ADMIN — CEFTRIAXONE SCH MLS: 1 INJECTION, POWDER, FOR SOLUTION INTRAMUSCULAR; INTRAVENOUS at 08:24

## 2020-11-29 RX ADMIN — ASPIRIN 81 MG CHEWABLE TABLET SCH MG: 81 TABLET CHEWABLE at 07:49

## 2020-11-29 RX ADMIN — AZITHROMYCIN SCH MLS: 500 INJECTION, POWDER, LYOPHILIZED, FOR SOLUTION INTRAVENOUS at 09:16

## 2020-11-29 RX ADMIN — NITROGLYCERIN SCH INCH: 20 OINTMENT TOPICAL at 14:35

## 2020-11-29 RX ADMIN — PANTOPRAZOLE SODIUM SCH MG: 40 GRANULE, DELAYED RELEASE ORAL at 07:50

## 2020-11-29 NOTE — PRG
DATE OF SERVICE:  11/29/2020



SUBJECTIVE:  Ms. Hart was evaluated today.  With 20 of pressure support, her

exhaled volumes are 400 mL.  Her pressure support has dropped to 10.  Her 
exhaled

volumes are 100 to 150 mL. 



OBJECTIVE:  VITAL SIGNS:  Blood pressure 166/79, heart rate is 80, respiratory 
rate

is 18, oximetry is in the high 90s, FiO2 of 40%. 

LUNGS:  Clear anteriorly. 

HEART:  Regular rhythm. 

ABDOMEN:  Soft. 

EXTREMITIES:  Without asymmetry.



LABORATORY DATA:  Chest x-ray today is unchanged.  White count 13.5, hemoglobin 
8.9,

platelets 414.  Electrolytes are unremarkable.  BUN 49, creatinine 1.49.  Intake
and

outputs negative 1083. 



IMPRESSION:  

1. Congestive heart failure.

2. History of unilateral weakness, but no history of a stroke by her report.  
She

ambulates very slowly and deliberately with a cane from what I can tell by yes 
and

no questions.  Deconditioning and muscle weakness will likely be a huge factor 
in

successfully weaning her from mechanical ventilation.  It is unclear at this 
point

whether or not she will need a tracheostomy, but is certainly a possibility in 
her

future. 



Critical care time 30 min.



Job ID:  [id number]



MTDD

## 2020-11-29 NOTE — PDOC.HOSPP
- Subjective


Encounter Date: 11/29/20


Encounter Time: 16:25


Subjective: 


f/u for resp failure/diast CHF receiving Lasix/mech ventilation with FIO2 40%. 

Not weanable and severely deconditioned with prolonged CCU stay. 





- Objective


Vital Signs & Weight: 


                             Vital Signs (12 hours)











  Temp Pulse Resp BP Pulse Ox


 


 11/29/20 16:00    16  


 


 11/29/20 15:39   83   175/134 H 


 


 11/29/20 15:38   84  19   98


 


 11/29/20 14:00    18  


 


 11/29/20 12:00  98.8 F   16  


 


 11/29/20 11:40   85   163/74 H 


 


 11/29/20 10:00    14  


 


 11/29/20 08:00  99.0 F   12   99


 


 11/29/20 07:49   88   160/78 H 


 


 11/29/20 06:00    15  


 


 11/29/20 04:55   88   160/78 H 


 


 11/29/20 04:52   88  16   99








                                     Weight











Admit Weight                   361 lb


 


Weight                         335 lb 15.752 oz











                            Most Recent Monitor Data











Heart Rate from ECG            82


 


NIBP                           178/83


 


NIBP BP-Mean                   114


 


Respiration from ECG           16


 


SpO2                           98














I&O: 


                                        











 11/28/20 11/29/20 11/30/20





 06:59 06:59 06:59


 


Intake Total 1818 2427 150


 


Output Total 3280 3510 1090


 


Balance -1462 -1083 -940











Result Diagrams: 


                                 11/29/20 04:10





                                 11/29/20 04:10


Additional Labs: 


                                   Accuchecks











  11/29/20 11/29/20 11/29/20





  11:08 06:14 00:33


 


POC Glucose  150 H  135 H  144 H








Microbiology





11/30/19 11:40   Stool   Stool Occult Blood (TAWANDA) - Final


11/21/20 14:54   Urine malagon catheter   Urine Culture - Final


                              NO GROWTH AT 48 HOURS





                                Laboratory Tests











  11/30/19 11/30/19 12/01/19





  05:13 13:25 03:51


 


WBC  13.2 H  


 


Hgb  8.8 L  


 


Neutrophils %  91.6 H   74.9


 


Potassium   5.3 H 


 


Carbon Dioxide   


 


BUN   42 H 


 


Creatinine   1.83 H 


 


Iron   


 


Ferritin   


 


TSH 3rd Generation   


 


SARS-CoV-2 Rap RNA(RT-PCR)   














  11/21/20 11/21/20 11/22/20





  16:00 22:06 04:05


 


WBC   


 


Hgb   


 


Neutrophils %   


 


Potassium   


 


Carbon Dioxide   


 


BUN   


 


Creatinine   


 


Iron    11 L


 


Ferritin   


 


TSH 3rd Generation   1.1967 


 


SARS-CoV-2 Rap RNA(RT-PCR)  Not Detected  














  11/22/20 11/22/20 11/23/20





  04:05 11:02 03:55


 


WBC   11.2 H 


 


Hgb   7.0 L 


 


Neutrophils %   


 


Potassium   


 


Carbon Dioxide   


 


BUN   


 


Creatinine    2.27 H


 


Iron   


 


Ferritin  127.01  


 


TSH 3rd Generation   


 


SARS-CoV-2 Rap RNA(RT-PCR)   














  11/23/20 11/24/20 11/24/20





  03:55 03:20 03:30


 


WBC  10.4  13.7 H 


 


Hgb  6.8 L  7.4 L 


 


Neutrophils %   


 


Potassium   


 


Carbon Dioxide   


 


BUN   


 


Creatinine    2.67 H


 


Iron   


 


Ferritin   


 


TSH 3rd Generation   


 


SARS-CoV-2 Rap RNA(RT-PCR)   














  11/25/20 11/25/20 11/26/20





  04:35 04:35 03:55


 


WBC   13.6 H 


 


Hgb   7.2 L 


 


Neutrophils %   


 


Potassium   


 


Carbon Dioxide    30 H


 


BUN    58 H


 


Creatinine  2.72 H   2.46 H


 


Iron   


 


Ferritin   


 


TSH 3rd Generation   


 


SARS-CoV-2 Rap RNA(RT-PCR)   














  11/26/20 11/27/20





  03:55 04:00


 


WBC  


 


Hgb  6.5 L 


 


Neutrophils %  83.9 H  86.2 H


 


Potassium  


 


Carbon Dioxide  


 


BUN  


 


Creatinine  


 


Iron  


 


Ferritin  


 


TSH 3rd Generation  


 


SARS-CoV-2 Rap RNA(RT-PCR)  











Radiology Reviewed by me: Yes (PCXR - lines/tubes in position, no significant 

interval change)


EKG Reviewed by me: Yes (Tele - SR)





Hospitalist ROS





- Medication


Medications: 


Active Medications











Generic Name Dose Route Start Last Admin





  Trade Name Deonteq  PRN Reason Stop Dose Admin


 


Acetaminophen  650 mg  11/21/20 17:39  11/27/20 14:13





  Acetaminophen 650 Mg/20.3 Ml Udcup  PO   650 mg





  Q6H PRN   Administration





  Fever > 101 or Mild Pain  


 


Albuterol/Ipratropium  3 ml  11/21/20 19:00  11/29/20 15:38





  Ipratropium/Albuterol Sulfate 3 Ml Neb  NEB   3 ml





  [id number] SANDEE   Administration


 


Amlodipine Besylate  5 mg  11/25/20 09:00  11/29/20 07:49





  Amlodipine 5 Mg Tab  PO   5 mg





  BID SANDEE   Administration


 


Aspirin  81 mg  11/22/20 09:00  11/29/20 07:49





  Aspirin Chewable 81 Mg Tab  PO   81 mg





  DAILY SANDEE   Administration


 


Bisacodyl  10 mg  11/21/20 17:39  11/23/20 05:33





  Bisacodyl 10 Mg Supp  AL   10 mg





  DAILYPRN PRN   Administration





  Constipation  


 


Carvedilol  25 mg  11/27/20 17:00  11/29/20 07:50





  Carvedilol 25 Mg Tab  PO   25 mg





  BID-WM SANDEE   Administration


 


Cyanocobalamin  1,000 mcg  11/23/20 09:00  11/29/20 07:50





  Cyanocobalamin (Vitamin B-12) 1,000 Mcg Tab  PER TUBE   1,000 mcg





  DAILY SANDEE   Administration


 


Enoxaparin Sodium  30 mg  11/24/20 21:00  11/28/20 21:24





  Enoxaparin Sodium 30 Mg/0.3 Ml Syringe  SC   30 mg





  2100 SANDEE   Administration


 


Folic Acid  1 mg  11/23/20 09:00  11/29/20 07:49





  Folic Acid 1 Mg Tab  PER TUBE   1 mg





  DAILY SANDEE   Administration


 


Furosemide  40 mg  11/27/20 21:00  11/29/20 08:24





  Furosemide 40 Mg/4 Ml Vial  SLOW IVP   40 mg





  BID SANDEE   Administration


 


Hydralazine HCl  20 mg  11/27/20 14:13  11/28/20 23:51





  Hydralazine 20 Mg/Ml Vial  SLOW IVP   20 mg





  Q4H PRN   Administration





  SBP Greater Than 180  


 


Fentanyl Citrate 2,000 mcg/  100 mls @ 0 mls/hr  11/21/20 19:00  11/25/20 00:28





  Sodium Chloride  IV  12/21/20 19:00  100 mls





  INF SANDEE   Administration





  Protocol  





  Per Protocol  


 


Azithromycin 500 mg/ Sodium  250 mls @ 250 mls/hr  11/24/20 09:00  11/29/20 

09:16





  Chloride  IVPB   250 mls





  0900 SANDEE   Administration


 


Ceftriaxone Sodium 1 gm/  100 mls @ 200 mls/hr  11/24/20 09:00  11/29/20 08:24





  Sodium Chloride  IVPB   100 mls





  0900 SANDEE   Administration


 


Insulin Glargine 20 units/  0.2 mls @ 0.1 mls/hr  11/26/20 21:00  11/29/20 08:39





  Miscellaneous Medication  SC   0.2 mls





  Q12HR SANDEE   Administration


 


Insulin Human Regular  0 units  11/21/20 17:45  11/22/20 20:36





  Insulin Regular 300 Units/3 Ml Vial  SC   3 units





  .BEDTIME SLIDING SC PRN   Administration





  Bedtime Correctional Scale  


 


Insulin Human Regular  0 units  11/21/20 19:07  11/27/20 18:21





  Insulin Regular 300 Units/3 Ml Vial  SC   3 unit





  .AGGRESSIVE SLIDING  PRN   Administration





  Aggressive Correctional Scale  


 


Labetalol HCl  10 mg  11/21/20 20:15  11/28/20 22:24





  Labetalol Hcl 100 Mg/20 Ml Vial  SLOW IVP   10 mg





  Q4H PRN   Administration





  Systolic BP > 180  


 


Lorazepam  2 mg  11/21/20 19:00  11/29/20 14:35





  Lorazepam 2 Mg/Ml Vial  SLOW IVP  12/21/20 19:00  2 mg





  Q1H PRN   Administration





  Breakthrough agitation  


 


Methylprednisolone Sodium Succinate  20 mg  11/23/20 09:00  11/29/20 08:24





  Methylprednisolone Sod Succ 40 Mg Vial  IVP   20 mg





  BID SANDEE   Administration


 


Multivitamins  1 tab  11/23/20 09:00  11/29/20 07:50





  Multivit, Therapeutic 1 Tab  PER TUBE   1 tab





  DAILY SANDEE   Administration


 


Nitroglycerin  0.5 inch  11/28/20 15:15  11/29/20 14:35





  Nitroglycerin 2% Ointment 1 Inch/1 Gm Packet  TOP   0.5 inch





  Q8H SANDEE   Administration


 


Pantoprazole Sodium  40 mg  11/26/20 09:00  11/29/20 07:50





  Pantoprazole 40 Mg Granules Packet  PER TUBE   40 mg





  DAILY SANDEE   Administration


 


Propofol  1,000 mg  11/21/20 19:00  11/29/20 14:34





  Propofol 1,000 Mg/100 Ml Vial  IV  12/21/20 19:00  1,000 mg





  INF PRN   Administration





  TO ACHIEVE GOAL RASS  





  Protocol  


 


Venlafaxine HCl  75 mg  11/28/20 09:00  11/29/20 07:49





  Venlafaxine Hcl 75 Mg Tab  PO   75 mg





  DAILY SANDEE   Administration














- Exam


General - other findings: sedate on mech vent, opens eyes to name briefly


Eye: PERRL, anicteric sclera


ENT: normocephalic atraumatic, no oropharyngeal lesions


ENT - other findings: ETT in place


Neck: supple, symmetric, no JVD, no thyromegaly, no lymphadenopathy


Heart: RRR, no murmur, no gallops, no rubs, normal peripheral pulses


Heart - other findings: S1, S2


Respiratory: no ronchi, no tachypnea


Respiratory - other findings: diminished bilat, few scattered coarse sounds


Gastrointestinal: soft, non-tender, non-distended, normal bowel sounds, no 

palpable masses


Gastrointestinal - other findings: obese


Extremities: no cyanosis, no clubbing, 1+ LE edema


Skin: normal turgor, no lesions


Neurological - other findings: sedate on mech vent


Psychiatric: somnolent, lethargic





Hosp A/P


(1) Acute respiratory failure with hypoxia


Code(s): J96.01 - ACUTE RESPIRATORY FAILURE WITH HYPOXIA   Status: Acute   


Plan: 


Persistent requiring mech vent, may need to consider trach, continue mech 

ventilation, general pulm support








(2) Acute on chronic diastolic (congestive) heart failure


Code(s): I50.33 - ACUTE ON CHRONIC DIASTOLIC (CONGESTIVE) HEART FAILURE   

Status: Acute   


Plan: 


Continue Lasix 40mg IV BID, serial I/O's, Daily weight








(3) Obesity hypoventilation syndrome


Code(s): E66.2 - MORBID (SEVERE) OBESITY WITH ALVEOLAR HYPOVENTILATION   Status:

Chronic   





(4) GABBY (acute kidney injury)


Code(s): N17.9 - ACUTE KIDNEY FAILURE, UNSPECIFIED   Status: Acute   


Plan: 


Improved, monitor closely given diuretic exposure, free-H2O per Nephrology








(5) CKD (chronic kidney disease), stage III


Code(s): N18.3 - CHRONIC KIDNEY DISEASE, STAGE 3 (MODERATE) * DO NOT USE *   

Status: Chronic   


Qualifiers: 


   Chronic kidney disease stage 3 subtype: stage 3a (GFR 45-59)   Qualified 

Code(s): N18.31 - Chronic kidney disease, stage 3a   





(6) DM type 2 (diabetes mellitus, type 2)


Status: Chronic   


Qualifiers: 


   Diabetes mellitus long term insulin use: with long term use   Diabetes 

mellitus complication status: with kidney complications   Diabetes mellitus 

complication detail: with chronic kidney disease   Chronic kidney disease stage:

stage 3 (moderate)   Qualified Code(s): E11.22 - Type 2 diabetes mellitus with 

diabetic chronic kidney disease; N18.3 - Chronic kidney disease, stage 3 

(moderate); Z79.4 - Long term (current) use of insulin   


Plan: 


Stable currently, continue Lantus 20u BID, ISS








(7) Iron deficiency anemia


Code(s): D50.9 - IRON DEFICIENCY ANEMIA, UNSPECIFIED   Status: Chronic   


Qualifiers: 


   Iron deficiency anemia type: inadequate dietary iron intake   Qualified 

Code(s): D50.8 - Other iron deficiency anemias   





- Plan


continue antibiotics, PT/OT, , speech therapy, respiratory 

therapy, DVT proph w/SCDs





Continue critical support


s/p 1u PRBC's


s/p IV Iron infusion 


Continue Lantus 20u BID


Continue Zithromax/Rocephin


Protonix 40mg PT daily


Lasix 40mg IV BID


Hydralazine 20mg IV q4h PRN 


AM lab: BMP, CBC, ABG

## 2020-11-29 NOTE — RAD
PORTABLE CHEST 1 VIEW:

 

Date:  11/29/2020

Time:  0422 hours

 

HISTORY:  

Respiratory failure. 

 

FINDINGS/IMPRESSION: 

No significant interval change is seen since the previous day's exam. 

 

 

POS: ESME

## 2020-11-29 NOTE — PRG
DATE OF SERVICE:  11/29/2020



SUBJECTIVE:  The patient was seen and examined in the ICU, remains intubated.



OBJECTIVE:  GENERAL:  This is a morbidly obese female in the ICU. 

VITAL SIGNS: Temperature 99.3, pulse 87, respiratory rate 20, blood pressure 146/66. 

HEENT: Intubated. 

CV: S1, S2 heard. 

RESPIRATORY: Clear. 

GI: Abdomen is obese. 

MUSCULOSKELETAL: 1 to 2+ edema.   

NEUROLOGICAL: Intubated.



LABORATORY DATA:  Potassium 4.1, BUN is 49, creatinine is 1.4.



ASSESSMENT AND PLAN:  

1. Acute kidney injury on chronic kidney disease stage 3 with improvement in renal

function.  Recommended cautious use of diuretics. 

2. Fluid overload.

3. Hypokalemia.  Replace and monitor electrolytes.

4. Alkalosis.

5. Cardiorenal syndrome.

6. Monitor electrolytes closely. Monitor renal function.







Job ID:  [id number]

## 2020-11-30 LAB
ANALYZER IN CARDIO: (no result)
ANION GAP SERPL CALC-SCNC: 13 MMOL/L (ref 10–20)
BASE EXCESS STD BLDA CALC-SCNC: 8 MEQ/L
BASOPHILS # BLD AUTO: 0 THOU/UL (ref 0–0.2)
BASOPHILS NFR BLD AUTO: 0.2 % (ref 0–1)
BUN SERPL-MCNC: 54 MG/DL (ref 9.8–20.1)
CA-I BLDA-SCNC: 1.24 MMOL/L (ref 1.12–1.3)
CALCIUM SERPL-MCNC: 9.2 MG/DL (ref 7.8–10.44)
CHLORIDE SERPL-SCNC: 102 MMOL/L (ref 98–107)
CO2 SERPL-SCNC: 35 MMOL/L (ref 22–29)
CREAT CL PREDICTED SERPL C-G-VRATE: 103 ML/MIN (ref 70–130)
EOSINOPHIL # BLD AUTO: 0.2 THOU/UL (ref 0–0.7)
EOSINOPHIL NFR BLD AUTO: 1.7 % (ref 0–10)
GLUCOSE SERPL-MCNC: 173 MG/DL (ref 70–105)
HCO3 BLDA-SCNC: 34.2 MEQ/L (ref 22–28)
HCT VFR BLDA CALC: 26 % (ref 36–47)
HGB BLD-MCNC: 7.8 G/DL (ref 12–16)
HGB BLDA-MCNC: 8.9 G/DL (ref 12–16)
LYMPHOCYTES # BLD: 0.9 THOU/UL (ref 1.2–3.4)
LYMPHOCYTES NFR BLD AUTO: 9.4 % (ref 21–51)
MCH RBC QN AUTO: 24.4 PG (ref 27–31)
MCV RBC AUTO: 80.5 FL (ref 78–98)
MONOCYTES # BLD AUTO: 0.5 THOU/UL (ref 0.11–0.59)
MONOCYTES NFR BLD AUTO: 5.5 % (ref 0–10)
NEUTROPHILS # BLD AUTO: 8.2 THOU/UL (ref 1.4–6.5)
NEUTROPHILS NFR BLD AUTO: 83.2 % (ref 42–75)
O2 A-A PPRESDIFF RESPIRATORY: 126.9 MMHG (ref 0–20)
PCO2 BLDA: 57.6 MMHG (ref 35–45)
PH BLDA: 7.39 [PH] (ref 7.35–7.45)
PLATELET # BLD AUTO: 339 THOU/UL (ref 130–400)
PO2 BLDA: 86.3 MMHG (ref 80–100)
POTASSIUM BLD-SCNC: 3.61 MMOL/L (ref 3.7–5.3)
POTASSIUM SERPL-SCNC: 3.7 MMOL/L (ref 3.5–5.1)
RBC # BLD AUTO: 3.19 MILL/UL (ref 4.2–5.4)
SODIUM SERPL-SCNC: 146 MMOL/L (ref 136–145)
SPECIMEN DRAWN FROM PATIENT: (no result)
WBC # BLD AUTO: 9.8 THOU/UL (ref 4.8–10.8)

## 2020-11-30 RX ADMIN — NITROGLYCERIN SCH INCH: 20 OINTMENT TOPICAL at 14:40

## 2020-11-30 RX ADMIN — ASPIRIN 81 MG CHEWABLE TABLET SCH MG: 81 TABLET CHEWABLE at 09:14

## 2020-11-30 RX ADMIN — CEFTRIAXONE SCH MLS: 1 INJECTION, POWDER, FOR SOLUTION INTRAMUSCULAR; INTRAVENOUS at 09:09

## 2020-11-30 RX ADMIN — AZITHROMYCIN SCH MLS: 500 INJECTION, POWDER, LYOPHILIZED, FOR SOLUTION INTRAVENOUS at 10:10

## 2020-11-30 RX ADMIN — NITROGLYCERIN SCH INCH: 20 OINTMENT TOPICAL at 23:49

## 2020-11-30 RX ADMIN — INSULIN GLARGINE SCH MLS: 100 INJECTION, SOLUTION SUBCUTANEOUS at 20:07

## 2020-11-30 RX ADMIN — PANTOPRAZOLE SODIUM SCH MG: 40 GRANULE, DELAYED RELEASE ORAL at 09:14

## 2020-11-30 RX ADMIN — NITROGLYCERIN SCH INCH: 20 OINTMENT TOPICAL at 07:35

## 2020-11-30 RX ADMIN — INSULIN GLARGINE SCH MLS: 100 INJECTION, SOLUTION SUBCUTANEOUS at 09:14

## 2020-11-30 RX ADMIN — CYANOCOBALAMIN TAB 1000 MCG SCH MCG: 1000 TAB at 09:22

## 2020-11-30 RX ADMIN — THERA TABS SCH TAB: TAB at 09:14

## 2020-11-30 NOTE — RAD
PORTABLE CHEST:

 

INDICATION: 

O ventilator.  CCU followup.

 

COMPARISON: 

11/29/2020.

 

FINDINGS/IMPRESSION: 

ET tube, NG tube, and central line remain in place.  Cardiomegaly with vascular engorgement.  Small b
ilateral effusions and bibasilar atelectasis and/or infiltrates.  Suboptimally evaluated due to soft 
tissue attenuation from body habitus.  No significant interval change apparent from 11/29/2020.

 

POS: OFF

## 2020-11-30 NOTE — PRG
DATE OF SERVICE:  11/30/2020



SUBJECTIVE:  Ms. Hart is doing much better, terms of waking up, but still remains

on mechanical ventilation for very poor respiratory muscle function. 



OBJECTIVE:  VITAL SIGNS:  Temperature 98.9, pulse 83, blood pressure 180/77, O2

saturation 100%.  24-hour intake 2953, output 2295. 

HEENT:  Unremarkable. 

NECK:  No JVD. 

LUNGS:  Coarse breath sounds. 

CARDIAC:  S1 and S2 regular. 

ABDOMEN:  Obese, soft, nontender. 

EXTREMITIES:  No edema. 

NEUROLOGIC:  She can move her right side without difficulty.  She is very weak in

her left arm. 



LABORATORY DATA:  White blood cell count 9.8, hematocrit 25.7, and platelet count

339.  PH 7.39, pCO2 of 57, PO2 of 86 on SIMV rate of 8, tidal volume 450, PEEP 5,

pressure support 20, FiO2 of 40%.  Sodium 146, potassium 3.7, chloride 102, CO2 of

35, BUN 54, creatinine 1.5, glucose 173. 



ASSESSMENT:  

1. Acute hypoxic and hypercapnic respiratory failure requiring mechanical

ventilation. 

2. Diastolic heart dysfunction.

3. Cerebrovascular accident.

4. Encephalopathy.

5. Renal insufficiency.



PLAN:  

1. Due to neuromuscular weakness, she is not weanable at this time.  I think we can 

probably stop her antibiotics after tomorrow.  I think she is likely headed toward

its tracheostomy and feeding tube placement.  I have not been able to discuss that 

with the family so far.

2. I will reduce her diuretic dose to daily.







Job ID:  [id number]

## 2020-11-30 NOTE — PDOC.HOSPP
- Subjective


Encounter Date: 11/30/20


Encounter Time: 18:00


Subjective: 


[facility] more alert per nursing, follows 

commands, tracks with eyes and nods to questions. Receiving Lasix and GABBY noted 

with increase in free-H2O administration.





- Objective


Vital Signs & Weight: 


                             Vital Signs (12 hours)











  Temp Pulse Pulse Pulse Resp BP BP


 


 11/30/20 18:34   73     143/68 H 


 


 11/30/20 18:33   73    14  


 


 11/30/20 18:23   71     208/92 H 


 


 11/30/20 18:00      16  


 


 11/30/20 16:00  98.7 F     16  


 


 11/30/20 14:55   71     152/77 H 


 


 11/30/20 14:00      16  


 


 11/30/20 13:31   69     154/66 H 


 


 11/30/20 12:00  98.4 F     12  


 


 11/30/20 10:37   82     169/78 H 


 


 11/30/20 10:10    81  79    175/79 H


 


 11/30/20 10:00      20  


 


 11/30/20 09:08   82     163/77 H 


 


 11/30/20 08:00      12  














  BP Pulse Ox Pulse Ox Pulse Ox


 


 11/30/20 18:34    


 


 11/30/20 18:33   99  


 


 11/30/20 18:23    


 


 11/30/20 18:00    


 


 11/30/20 16:00    


 


 11/30/20 14:55    


 


 11/30/20 14:00    


 


 11/30/20 13:31    


 


 11/30/20 12:00    


 


 11/30/20 10:37    


 


 11/30/20 10:10  167/76 H   99  99


 


 11/30/20 10:00    


 


 11/30/20 09:08    


 


 11/30/20 08:00   97  








                                     Weight











Admit Weight                   361 lb


 


Weight                         332 lb 7.313 oz











                            Most Recent Monitor Data











Heart Rate from ECG            77


 


NIBP                           194/101


 


NIBP BP-Mean                   132


 


Respiration from ECG           13


 


SpO2                           99














I&O: 


                                        











 11/29/20 11/30/20 12/01/20





 06:59 06:59 06:59


 


Intake Total 2427 2953 1795.9


 


Output Total 3510 2295 1440


 


Balance -1083 658 355.9











Result Diagrams: 


                                 11/30/20 03:10





                                 11/30/20 03:10


Additional Labs: 


                                   Accuchecks











  11/30/20 11/30/20 11/30/20





  11:33 06:13 00:19


 


POC Glucose  149 H  129 H  151 H














  11/29/20 11/27/20 11/26/20





  16:05 17:45 11:52


 


POC Glucose  140 H  170 H  126 H














  11/26/20





  00:59


 


POC Glucose  89








Microbiology





11/30/19 11:40   Stool   Stool Occult Blood (TAWANDA) - Final


11/21/20 14:54   Urine malagon catheter   Urine Culture - Final


                              NO GROWTH AT 48 HOURS





                                Laboratory Tests











  11/30/19 11/30/19 12/01/19





  05:13 13:25 03:51


 


WBC  13.2 H  


 


Hgb  8.8 L  


 


Neutrophils %  91.6 H   74.9


 


Potassium   5.3 H 


 


Carbon Dioxide   


 


BUN   42 H 


 


Creatinine   1.83 H 


 


Iron   


 


Ferritin   


 


TSH 3rd Generation   


 


SARS-CoV-2 Rap RNA(RT-PCR)   














  11/21/20 11/21/20 11/22/20





  16:00 22:06 04:05


 


WBC   


 


Hgb   


 


Neutrophils %   


 


Potassium   


 


Carbon Dioxide   


 


BUN   


 


Creatinine   


 


Iron    11 L


 


Ferritin   


 


TSH 3rd Generation   1.1967 


 


SARS-CoV-2 Rap RNA(RT-PCR)  Not Detected  














  11/22/20 11/22/20 11/23/20





  04:05 11:02 03:55


 


WBC   11.2 H 


 


Hgb   7.0 L 


 


Neutrophils %   


 


Potassium   


 


Carbon Dioxide   


 


BUN   


 


Creatinine    2.27 H


 


Iron   


 


Ferritin  127.01  


 


TSH 3rd Generation   


 


SARS-CoV-2 Rap RNA(RT-PCR)   














  11/23/20 11/24/20 11/24/20





  03:55 03:20 03:30


 


WBC  10.4  13.7 H 


 


Hgb  6.8 L  7.4 L 


 


Neutrophils %   


 


Potassium   


 


Carbon Dioxide   


 


BUN   


 


Creatinine    2.67 H


 


Iron   


 


Ferritin   


 


TSH 3rd Generation   


 


SARS-CoV-2 Rap RNA(RT-PCR)   














  11/25/20 11/25/20 11/26/20





  04:35 04:35 03:55


 


WBC   13.6 H 


 


Hgb   7.2 L 


 


Neutrophils %   


 


Potassium   


 


Carbon Dioxide    30 H


 


BUN    58 H


 


Creatinine  2.72 H   2.46 H


 


Iron   


 


Ferritin   


 


TSH 3rd Generation   


 


SARS-CoV-2 Rap RNA(RT-PCR)   














  11/26/20 11/27/20





  03:55 04:00


 


WBC  


 


Hgb  6.5 L 


 


Neutrophils %  83.9 H  86.2 H


 


Potassium  


 


Carbon Dioxide  


 


BUN  


 


Creatinine  


 


Iron  


 


Ferritin  


 


TSH 3rd Generation  


 


SARS-CoV-2 Rap RNA(RT-PCR)  











Radiology Reviewed by me: Yes (PCXR - lines/tubes in place, atelectasis in 

bases, vasc prominence)


EKG Reviewed by me: Yes (Tele - SR)





Hospitalist ROS





- Medication


Medications: 


Active Medications











Generic Name Dose Route Start Last Admin





  Trade Name Freq  PRN Reason Stop Dose Admin


 


Acetaminophen  650 mg  11/21/20 17:39  11/27/20 14:13





  Acetaminophen 650 Mg/20.3 Ml Udcup  PO   650 mg





  Q6H PRN   Administration





  Fever > 101 or Mild Pain  


 


Albuterol/Ipratropium  3 ml  11/21/20 19:00  11/30/20 18:33





  Ipratropium/Albuterol Sulfate 3 Ml Neb  NEB   3 ml





  [id number] SANDEE   Administration


 


Amlodipine Besylate  5 mg  11/25/20 09:00  11/30/20 09:08





  Amlodipine 5 Mg Tab  PO   5 mg





  BID SANDEE   Administration


 


Aspirin  81 mg  11/22/20 09:00  11/30/20 09:14





  Aspirin Chewable 81 Mg Tab  PO   81 mg





  DAILY SANDEE   Administration


 


Bisacodyl  10 mg  11/21/20 17:39  11/23/20 05:33





  Bisacodyl 10 Mg Supp  NM   10 mg





  DAILYPRN PRN   Administration





  Constipation  


 


Carvedilol  25 mg  11/27/20 17:00  11/30/20 17:14





  Carvedilol 25 Mg Tab  PO   25 mg





  BID-WM SANDEE   Administration


 


Cyanocobalamin  1,000 mcg  11/23/20 09:00  11/30/20 09:22





  Cyanocobalamin (Vitamin B-12) 1,000 Mcg Tab  PER TUBE   1,000 mcg





  DAILY SANDEE   Administration


 


Enoxaparin Sodium  30 mg  11/24/20 21:00  11/29/20 20:07





  Enoxaparin Sodium 30 Mg/0.3 Ml Syringe  SC   30 mg





  2100 SANDEE   Administration


 


Folic Acid  1 mg  11/23/20 09:00  11/30/20 09:14





  Folic Acid 1 Mg Tab  PER TUBE   1 mg





  DAILY SANDEE   Administration


 


Hydralazine HCl  20 mg  11/27/20 14:13  11/30/20 18:23





  Hydralazine 20 Mg/Ml Vial  SLOW IVP   20 mg





  Q4H PRN   Administration





  SBP Greater Than 180  


 


Fentanyl Citrate 2,000 mcg/  100 mls @ 0 mls/hr  11/21/20 19:00  11/25/20 00:28





  Sodium Chloride  IV  12/21/20 19:00  100 mls





  INF SANDEE   Administration





  Protocol  





  Per Protocol  


 


Azithromycin 500 mg/ Sodium  250 mls @ 250 mls/hr  11/24/20 09:00  11/30/20 

10:10





  Chloride  IVPB   250 mls





  0900 SANDEE   Administration


 


Ceftriaxone Sodium 1 gm/  100 mls @ 200 mls/hr  11/24/20 09:00  11/30/20 09:09





  Sodium Chloride  IVPB   100 mls





  0900 SANDEE   Administration


 


Insulin Glargine 20 units/  0.2 mls @ 0.1 mls/hr  11/26/20 21:00  11/30/20 09:14





  Miscellaneous Medication  SC   0.2 mls





  Q12HR SANDEE   Administration


 


Dexmedetomidine HCl 400 mcg/  100 mls @ 0 mls/hr  11/30/20 10:45  11/30/20 11:16





  Sodium Chloride  IVPB   100 mls





  INF SANDEE   Administration





  Protocol  





  Per Protocol  


 


Insulin Human Regular  0 units  11/21/20 17:45  11/22/20 20:36





  Insulin Regular 300 Units/3 Ml Vial  SC   3 units





  .BEDTIME SLIDING SC PRN   Administration





  Bedtime Correctional Scale  


 


Insulin Human Regular  0 units  11/21/20 19:07  11/27/20 18:21





  Insulin Regular 300 Units/3 Ml Vial  SC   3 unit





  .AGGRESSIVE SLIDING  PRN   Administration





  Aggressive Correctional Scale  


 


Labetalol HCl  10 mg  11/21/20 20:15  11/28/20 22:24





  Labetalol Hcl 100 Mg/20 Ml Vial  SLOW IVP   10 mg





  Q4H PRN   Administration





  Systolic BP > 180  


 


Lorazepam  2 mg  11/21/20 19:00  11/30/20 10:56





  Lorazepam 2 Mg/Ml Vial  SLOW IVP  12/21/20 19:00  2 mg





  Q1H PRN   Administration





  Breakthrough agitation  


 


Multivitamins  1 tab  11/23/20 09:00  11/30/20 09:14





  Multivit, Therapeutic 1 Tab  PER TUBE   1 tab





  DAILY SANDEE   Administration


 


Nitroglycerin  0.5 inch  11/28/20 15:15  11/30/20 14:40





  Nitroglycerin 2% Ointment 1 Inch/1 Gm Packet  TOP   0.5 inch





  Q8H SANDEE   Administration


 


Pantoprazole Sodium  40 mg  11/26/20 09:00  11/30/20 09:14





  Pantoprazole 40 Mg Granules Packet  PER TUBE   40 mg





  DAILY SANDEE   Administration


 


Propofol  1,000 mg  11/21/20 19:00  11/30/20 06:07





  Propofol 1,000 Mg/100 Ml Vial  IV  12/21/20 19:00  1,000 mg





  INF PRN   Administration





  TO ACHIEVE GOAL RASS  





  Protocol  


 


Venlafaxine HCl  75 mg  11/28/20 09:00  11/30/20 09:14





  Venlafaxine Hcl 75 Mg Tab  PO   75 mg





  DAILY SANDEE   Administration














- Exam


General Appearance: awake alert


General - other findings: tracks with eyes, nods and moves extremities


Eye: PERRL, anicteric sclera


ENT: normocephalic atraumatic, no oropharyngeal lesions


ENT - other findings: ETT in place


Neck: supple, symmetric, no JVD, no thyromegaly, no lymphadenopathy


Heart: RRR, no gallops, no rubs, normal peripheral pulses


Heart - other findings: S1, S2


Respiratory: rhonchi


Respiratory - other findings: diminished in bases bilat, coughing


Gastrointestinal: soft, non-tender, non-distended, normal bowel sounds, no 

palpable masses


Gastrointestinal - other findings: obese


Extremities: no cyanosis, 2+ LE edema


Skin: normal turgor, no lesions


Neurological: no new deficit


Musculoskeletal: generalized weakness


Psychiatric: oriented to person, flat affect, somnolent





Hosp A/P


(1) Acute respiratory failure with hypoxia


Code(s): J96.01 - ACUTE RESPIRATORY FAILURE WITH HYPOXIA   Status: Acute   


Plan: 


Continue mech vent and wean as clinically indicated, PCXR in am








(2) Acute on chronic diastolic (congestive) heart failure


Code(s): I50.33 - ACUTE ON CHRONIC DIASTOLIC (CONGESTIVE) HEART FAILURE   

Status: Acute   


Plan: 


Lasix 40mg IV daily, serial I/O's, Daily weight








(3) Obesity hypoventilation syndrome


Code(s): E66.2 - MORBID (SEVERE) OBESITY WITH ALVEOLAR HYPOVENTILATION   Status:

Chronic   





(4) GABBY (acute kidney injury)


Code(s): N17.9 - ACUTE KIDNEY FAILURE, UNSPECIFIED   Status: Acute   


Plan: 


Monitor closely given diuretic exposure, free-H2O 250ml Q6H








(5) CKD (chronic kidney disease), stage III


Code(s): N18.3 - CHRONIC KIDNEY DISEASE, STAGE 3 (MODERATE) * DO NOT USE *   

Status: Chronic   


Qualifiers: 


   Chronic kidney disease stage 3 subtype: stage 3a (GFR 45-59)   Qualified 

Code(s): N18.31 - Chronic kidney disease, stage 3a   





(6) DM type 2 (diabetes mellitus, type 2)


Status: Chronic   


Qualifiers: 


   Diabetes mellitus long term insulin use: with long term use   Diabetes 

mellitus complication status: with kidney complications   Diabetes mellitus 

complication detail: with chronic kidney disease   Chronic kidney disease stage:

stage 3 (moderate)   Qualified Code(s): E11.22 - Type 2 diabetes mellitus with 

diabetic chronic kidney disease; N18.3 - Chronic kidney disease, stage 3 

(moderate); Z79.4 - Long term (current) use of insulin   





(7) Iron deficiency anemia


Code(s): D50.9 - IRON DEFICIENCY ANEMIA, UNSPECIFIED   Status: Chronic   


Qualifiers: 


   Iron deficiency anemia type: inadequate dietary iron intake   Qualified 

Code(s): D50.8 - Other iron deficiency anemias   





- Plan


malagon catheter, continue antibiotics, PT/OT, , respiratory 

therapy, DVT proph w/SCDs


Consults: Palliative Care





Continue critical support


s/p 1u PRBC's


s/p IV Iron infusion 


Continue Lantus 20u BID


Continue Zithromax/Rocephin


Protonix 40mg PT daily


Lasix 40mg IV Daily


Hydralazine 20mg IV q4h PRN 


Wean mech ventilation per protocol


AM lab: BMP, CBC, Mg++, PO3, ABG


PCXR in am

## 2020-11-30 NOTE — PRG
DATE OF SERVICE:  11/30/2020



SUBJECTIVE:  The patient was seen and examined in the ICU, remains intubated.



OBJECTIVE:  VITAL SIGNS:  Temperature 98.9, pulse 81, respiratory rate 19, blood

pressure 180/77. 

HEENT:  Intubated. 

CV:  S1, S2 heard. 

RESPIRATORY:  Clear. 

GI:  Abdomen is obese. 

MUSCULOSKELETAL:  1+ edema. 

NEUROLOGIC:  Intubated.



LABORATORY DATA:  Sodium 146, potassium 3.7, BUN 54, creatinine is 1.5.



ASSESSMENT AND PLAN:  

1. Acute kidney injury on chronic kidney disease stage 3, stable labs.

2. Hypernatremia.  Recommend free water.

3. Hypokalemia.  Replace and we will monitor.

4. Alkalosis.

5. Cardiorenal syndrome.

6. Fluid overload.

7. Acute hypoxic respiratory failure.

8. Replace potassium and recommend free water.







Job ID:  [id number]

## 2020-12-01 LAB
ANALYZER IN CARDIO: (no result)
ANION GAP SERPL CALC-SCNC: 15 MMOL/L (ref 10–20)
BASE EXCESS STD BLDA CALC-SCNC: 8.8 MEQ/L
BASOPHILS # BLD AUTO: 0.1 THOU/UL (ref 0–0.2)
BASOPHILS NFR BLD AUTO: 1.1 % (ref 0–1)
BUN SERPL-MCNC: 55 MG/DL (ref 9.8–20.1)
CA-I BLDA-SCNC: 1.22 MMOL/L (ref 1.12–1.3)
CALCIUM SERPL-MCNC: 9.3 MG/DL (ref 7.8–10.44)
CHLORIDE SERPL-SCNC: 104 MMOL/L (ref 98–107)
CO2 SERPL-SCNC: 32 MMOL/L (ref 22–29)
CREAT CL PREDICTED SERPL C-G-VRATE: 104 ML/MIN (ref 70–130)
EOSINOPHIL # BLD AUTO: 0.3 THOU/UL (ref 0–0.7)
EOSINOPHIL NFR BLD AUTO: 2.7 % (ref 0–10)
GLUCOSE SERPL-MCNC: 121 MG/DL (ref 70–105)
HCO3 BLDA-SCNC: 34.5 MEQ/L (ref 22–28)
HCT VFR BLDA CALC: 25 % (ref 36–47)
HGB BLD-MCNC: 8.2 G/DL (ref 12–16)
HGB BLDA-MCNC: 8.5 G/DL (ref 12–16)
LYMPHOCYTES # BLD: 1.5 THOU/UL (ref 1.2–3.4)
LYMPHOCYTES NFR BLD AUTO: 15.1 % (ref 21–51)
MAGNESIUM SERPL-MCNC: 1.5 MG/DL (ref 1.6–2.6)
MCH RBC QN AUTO: 23.7 PG (ref 27–31)
MCV RBC AUTO: 80.7 FL (ref 78–98)
MONOCYTES # BLD AUTO: 0.7 THOU/UL (ref 0.11–0.59)
MONOCYTES NFR BLD AUTO: 7.5 % (ref 0–10)
NEUTROPHILS # BLD AUTO: 7.1 THOU/UL (ref 1.4–6.5)
NEUTROPHILS NFR BLD AUTO: 73.6 % (ref 42–75)
O2 A-A PPRESDIFF RESPIRATORY: 128.07 MMHG (ref 0–20)
PCO2 BLDA: 55.3 MMHG (ref 35–45)
PH BLDA: 7.41 [PH] (ref 7.35–7.45)
PLATELET # BLD AUTO: 330 THOU/UL (ref 130–400)
PO2 BLDA: 88 MMHG (ref 80–100)
POTASSIUM BLD-SCNC: 3.49 MMOL/L (ref 3.7–5.3)
POTASSIUM SERPL-SCNC: 3.5 MMOL/L (ref 3.5–5.1)
RBC # BLD AUTO: 3.47 MILL/UL (ref 4.2–5.4)
SODIUM SERPL-SCNC: 147 MMOL/L (ref 136–145)
SPECIMEN DRAWN FROM PATIENT: (no result)
WBC # BLD AUTO: 9.6 THOU/UL (ref 4.8–10.8)

## 2020-12-01 RX ADMIN — INSULIN GLARGINE SCH MLS: 100 INJECTION, SOLUTION SUBCUTANEOUS at 20:05

## 2020-12-01 RX ADMIN — ASPIRIN 81 MG CHEWABLE TABLET SCH MG: 81 TABLET CHEWABLE at 08:25

## 2020-12-01 RX ADMIN — NITROGLYCERIN SCH INCH: 20 OINTMENT TOPICAL at 16:02

## 2020-12-01 RX ADMIN — INSULIN GLARGINE SCH MLS: 100 INJECTION, SOLUTION SUBCUTANEOUS at 09:19

## 2020-12-01 RX ADMIN — CEFTRIAXONE SCH MLS: 1 INJECTION, POWDER, FOR SOLUTION INTRAMUSCULAR; INTRAVENOUS at 08:26

## 2020-12-01 RX ADMIN — AZITHROMYCIN SCH MLS: 500 INJECTION, POWDER, LYOPHILIZED, FOR SOLUTION INTRAVENOUS at 09:20

## 2020-12-01 RX ADMIN — PANTOPRAZOLE SODIUM SCH MG: 40 GRANULE, DELAYED RELEASE ORAL at 08:25

## 2020-12-01 RX ADMIN — NITROGLYCERIN SCH INCH: 20 OINTMENT TOPICAL at 08:31

## 2020-12-01 RX ADMIN — NITROGLYCERIN SCH INCH: 20 OINTMENT TOPICAL at 23:48

## 2020-12-01 RX ADMIN — THERA TABS SCH TAB: TAB at 09:20

## 2020-12-01 RX ADMIN — CYANOCOBALAMIN TAB 1000 MCG SCH MCG: 1000 TAB at 08:25

## 2020-12-01 NOTE — PDOC.CPN
- Subjective


Date: 11/30/20


Time: 12:15


Interval history: 





No overnight events. Not much change.





- Review of Systems


ROS unobtainable: due to mental status (intubated/sebated)





- Objective


Allergies/Adverse Reactions: 


                                    Allergies











Allergy/AdvReac Type Severity Reaction Status Date / Time


 


Penicillins Allergy  Hives Verified 11/26/20 03:23











Visit Medications: 


                               Current Medications





Acetaminophen (Acetaminophen 650 Mg/20.3 Ml Udcup)  650 mg PO Q6H PRN


   PRN Reason: Fever > 101 or Mild Pain


   Last Admin: 11/27/20 14:13 Dose:  650 mg


   Documented by: 


Acetaminophen (Acetaminophen 650 Mg Suppository)  650 mg RI Q6H PRN


   PRN Reason: Fever > 101 or Mild Pain


Acetaminophen (Acetaminophen 325 Mg Tab)  650 mg PO Q4H PRN


   PRN Reason: Headache/Fever/Mild Pain (1-3)


Albuterol/Ipratropium (Ipratropium/Albuterol Sulfate 3 Ml Neb)  3 ml NEB [id number]

[facility]


   Last Admin: 12/01/20 08:07 Dose:  3 ml


   Documented by: 


Amlodipine Besylate (Amlodipine 5 Mg Tab)  5 mg PO BID [facility]


   Last Admin: 12/01/20 08:25 Dose:  5 mg


   Documented by: 


Aspirin (Aspirin Chewable 81 Mg Tab)  81 mg PO DAILY [facility]


   Last Admin: 12/01/20 08:25 Dose:  81 mg


   Documented by: 


Bisacodyl (Bisacodyl 10 Mg Supp)  10 mg RI DAILYPRN PRN


   PRN Reason: Constipation


   Last Admin: 11/23/20 05:33 Dose:  10 mg


   Documented by: 


Carvedilol (Carvedilol 25 Mg Tab)  25 mg PO BID-WM [facility]


   Last Admin: 12/01/20 08:25 Dose:  25 mg


   Documented by: 


Cyanocobalamin (Cyanocobalamin (Vitamin B-12) 1,000 Mcg Tab)  1,000 mcg PER TUBE

DAILY [facility]


   Last Admin: 12/01/20 08:25 Dose:  1,000 mcg


   Documented by: 


Dextrose/Water (Dextrose 50% Abboject 50 Ml Syringe)  25 gm SLOW IVP PRN PRN


   PRN Reason: Hypoglycemia


Enoxaparin Sodium (Enoxaparin Sodium 30 Mg/0.3 Ml Syringe)  30 mg SC 2100 [facility]


   Last Admin: 11/30/20 20:07 Dose:  30 mg


   Documented by: 


Folic Acid (Folic Acid 1 Mg Tab)  1 mg PER TUBE DAILY [facility]


   Last Admin: 12/01/20 08:25 Dose:  1 mg


   Documented by: 


Furosemide (Furosemide 40 Mg/4 Ml Vial)  40 mg SLOW IVP DAILY [facility]


   Last Admin: 12/01/20 08:25 Dose:  40 mg


   Documented by: 


Glucagon (Glucagon 1 Mg/Ml Vial)  1 mg IM PRN PRN


   PRN Reason: Hypoglycemia


Hydralazine HCl (Hydralazine 20 Mg/Ml Vial)  20 mg SLOW IVP Q4H PRN


   PRN Reason: SBP Greater Than 180


   Last Admin: 12/01/20 03:19 Dose:  20 mg


   Documented by: 


Dextrose/Water (D5w)  1,000 mls @ 0 mls/hr IV .Q0M PRN


   PRN Reason: Hypoglycemia


Fentanyl Citrate 2,000 mcg/ (Sodium Chloride)  100 mls @ 0 mls/hr IV INF [facility]; 

Protocol


   Stop: 12/21/20 19:00


   Last Admin: 11/25/20 00:28 Dose:  100 mls


   Documented by: 


Fentanyl Citrate (Fentanyl Bolus)  250 mls @ 0 mls/hr IVPB PRN PRN


   PRN Reason: Breakthrough pain/agitation


   Stop: 12/21/20 19:00


Azithromycin 500 mg/ Sodium (Chloride)  250 mls @ 250 mls/hr IVPB 0900 [facility]


   Last Admin: 11/30/20 10:10 Dose:  250 mls


   Documented by: 


Ceftriaxone Sodium 1 gm/ (Sodium Chloride)  100 mls @ 200 mls/hr IVPB 0900 [facility]


   Last Admin: 12/01/20 08:26 Dose:  100 mls


   Documented by: 


Insulin Glargine 20 units/ (Miscellaneous Medication)  0.2 mls @ 0.1 mls/hr SC 

Q12HR [facility]


   Last Admin: 11/30/20 20:07 Dose:  0.2 mls


   Documented by: 


Dexmedetomidine HCl 400 mcg/ (Sodium Chloride)  100 mls @ 0 mls/hr IVPB INF [facility];

Protocol


   Last Admin: 11/30/20 20:09 Dose:  100 mls


   Documented by: 


Insulin Human Regular (Insulin Regular 300 Units/3 Ml Vial)  0 units SC .BEDTIME

SLIDING SC PRN


   PRN Reason: Bedtime Correctional Scale


   Last Admin: 11/22/20 20:36 Dose:  3 units


   Documented by: 


Insulin Human Regular (Insulin Regular 300 Units/3 Ml Vial)  0 units SC 

.AGGRESSIVE SLIDING  PRN


   PRN Reason: Aggressive Correctional Scale


   Last Admin: 11/27/20 18:21 Dose:  3 unit


   Documented by: 


Labetalol HCl (Labetalol Hcl 100 Mg/20 Ml Vial)  10 mg SLOW IVP Q4H PRN


   PRN Reason: Systolic BP > 180


   Last Admin: 11/28/20 22:24 Dose:  10 mg


   Documented by: 


Lorazepam (Lorazepam 2 Mg/Ml Vial)  2 mg SLOW IVP Q1H PRN


   PRN Reason: Breakthrough agitation


   Stop: 12/21/20 19:00


   Last Admin: 12/01/20 03:13 Dose:  2 mg


   Documented by: 


Morphine Sulfate (Morphine 2 Mg/Ml Vial)  2 mg SLOW IVP Q1H PRN


   PRN Reason: Breakthrough Pain/Agitation


   Stop: 12/21/20 19:00


Multivitamins (Multivit, Therapeutic 1 Tab)  1 tab PER TUBE DAILY [facility]


   Last Admin: 11/30/20 09:14 Dose:  1 tab


   Documented by: 


Nitroglycerin (Nitroglycerin 2% Ointment 1 Inch/1 Gm Packet)  0.5 inch TOP Q8H 

[facility]


   Last Admin: 12/01/20 08:31 Dose:  0.5 inch


   Documented by: 


Discontinue Previous Narcotic Pain Medications And Benzodiazepines  1 each FS 

.ONE [facility]


   Stop: 12/21/20 19:00


Pantoprazole Sodium (Pantoprazole 40 Mg Granules Packet)  40 mg PER TUBE DAILY 

[facility]


   Last Admin: 12/01/20 08:25 Dose:  40 mg


   Documented by: 


Propofol (Propofol 1,000 Mg/100 Ml Vial)  1,000 mg IV INF PRN; Protocol


   PRN Reason: TO ACHIEVE GOAL RASS


   Stop: 12/21/20 19:00


   Last Admin: 11/30/20 06:07 Dose:  1,000 mg


   Documented by: 


Senna/Docusate Sodium (Senokot S 8.6-50 Mg Tab)  2 tab PO BIDPRN PRN


   PRN Reason: Constipation


Sodium Chloride (Flush - Normal Saline 10 Ml Syringe)  10 ml IVF PRN PRN


   PRN Reason: Saline Flush


Venlafaxine HCl (Venlafaxine Hcl 75 Mg Tab)  75 mg PO DAILY [facility]


   Last Admin: 12/01/20 08:25 Dose:  75 mg


   Documented by: 








Vital Signs & Weight: 


                                   Vital Signs











  Temp Pulse Resp BP Pulse Ox


 


 12/01/20 08:25   82   160/68 H 


 


 12/01/20 08:08   82   160/68 H 


 


 12/01/20 08:00  98.5 F    


 


 12/01/20 04:00  97.9 F    


 


 12/01/20 03:19   79   208/116 H 


 


 12/01/20 02:40   82   165/73 H 


 


 12/01/20 00:47    10 L   98


 


 12/01/20 00:00  97.8 F    


 


 11/30/20 22:23   80   173/82 H 








                                        











Admit Weight                   361 lb


 


Weight                         332 lb 7.313 oz

















- Physical Exam


General: no apparent distress


HEENT: mucus membranes moist


Cardiac: regular rate and rhythm


Abdomen: soft





- Labs


Result Diagrams: 


                                 12/01/20 04:50





                                 12/01/20 04:50


                                  Troponin/CKMB











Troponin I  0.037 ng/mL (< 0.028)  H  11/22/20  04:05    














- Assessment/Plan


Assessment/Plan: 





1. Respiratory Failure


2. Diastolic Dysfunction


3. Encephalopathy


4. Morbid Obesity





No changes on my part. Poor overall prognosis

## 2020-12-01 NOTE — PDOC.HOSPP
- Subjective


Encounter Date: 12/01/20


Encounter Time: 16:30


Subjective: 


f/u for resp failure/CHF tx with mech ventilation and IV Lasix. More alert, 

interactive, talking with daughter at bedside. Attempting to wean off mech vent.





- Objective


Vital Signs & Weight: 


                             Vital Signs (12 hours)











  Temp Pulse Resp BP Pulse Ox


 


 12/01/20 18:19   78   168/75 H 


 


 12/01/20 18:00    13  


 


 12/01/20 16:00  98.1 F   12  


 


 12/01/20 14:29   80   178/74 H 


 


 12/01/20 14:00    17  


 


 12/01/20 12:45   80   160/81 H 


 


 12/01/20 12:00  98.0 F   16  


 


 12/01/20 10:22   83   148/69 H 


 


 12/01/20 10:00    11 L  


 


 12/01/20 08:25   82   160/68 H 


 


 12/01/20 08:08   82   160/68 H 


 


 12/01/20 08:00  98.5 F     97








                                     Weight











Admit Weight                   361 lb


 


Weight                         332 lb 7.313 oz











                            Most Recent Monitor Data











Heart Rate from ECG            77


 


NIBP                           168/75


 


NIBP BP-Mean                   106


 


Respiration from ECG           16


 


SpO2                           97














I&O: 


                                        











 11/30/20 12/01/20 12/02/20





 06:59 06:59 06:59


 


Intake Total 2953 2950.8 1664.1


 


Output Total 2295 1764 1690


 


Balance 658 1186.8 -25.9











Result Diagrams: 


                                 12/01/20 04:50





                                 12/01/20 04:50


Additional Labs: 


                                   Accuchecks











  12/01/20 11/30/20 11/30/20





  12:53 20:09 17:33


 


POC Glucose  129 H  146 H  151 H








Microbiology





11/30/19 11:40   Stool   Stool Occult Blood (TAWANDA) - Final


11/21/20 14:54   Urine malagon catheter   Urine Culture - Final


                              NO GROWTH AT 48 HOURS





                                Laboratory Tests











  11/30/19 11/30/19 12/01/19





  05:13 13:25 03:51


 


WBC  13.2 H  


 


Hgb  8.8 L  


 


Neutrophils %  91.6 H   74.9


 


Potassium   5.3 H 


 


Carbon Dioxide   


 


BUN   42 H 


 


Creatinine   1.83 H 


 


Iron   


 


Ferritin   


 


TSH 3rd Generation   


 


SARS-CoV-2 Rap RNA(RT-PCR)   














  11/21/20 11/21/20 11/22/20





  16:00 22:06 04:05


 


WBC   


 


Hgb   


 


Neutrophils %   


 


Potassium   


 


Carbon Dioxide   


 


BUN   


 


Creatinine   


 


Iron    11 L


 


Ferritin   


 


TSH 3rd Generation   1.1967 


 


SARS-CoV-2 Rap RNA(RT-PCR)  Not Detected  














  11/22/20 11/22/20 11/23/20





  04:05 11:02 03:55


 


WBC   11.2 H 


 


Hgb   7.0 L 


 


Neutrophils %   


 


Potassium   


 


Carbon Dioxide   


 


BUN   


 


Creatinine    2.27 H


 


Iron   


 


Ferritin  127.01  


 


TSH 3rd Generation   


 


SARS-CoV-2 Rap RNA(RT-PCR)   














  11/23/20 11/24/20 11/24/20





  03:55 03:20 03:30


 


WBC  10.4  13.7 H 


 


Hgb  6.8 L  7.4 L 


 


Neutrophils %   


 


Potassium   


 


Carbon Dioxide   


 


BUN   


 


Creatinine    2.67 H


 


Iron   


 


Ferritin   


 


TSH 3rd Generation   


 


SARS-CoV-2 Rap RNA(RT-PCR)   














  11/25/20 11/25/20 11/26/20





  04:35 04:35 03:55


 


WBC   13.6 H 


 


Hgb   7.2 L 


 


Neutrophils %   


 


Potassium   


 


Carbon Dioxide    30 H


 


BUN    58 H


 


Creatinine  2.72 H   2.46 H


 


Iron   


 


Ferritin   


 


TSH 3rd Generation   


 


SARS-CoV-2 Rap RNA(RT-PCR)   














  11/26/20 11/27/20





  03:55 04:00


 


WBC  


 


Hgb  6.5 L 


 


Neutrophils %  83.9 H  86.2 H


 


Potassium  


 


Carbon Dioxide  


 


BUN  


 


Creatinine  


 


Iron  


 


Ferritin  


 


TSH 3rd Generation  


 


SARS-CoV-2 Rap RNA(RT-PCR)  











Radiology Reviewed by me: Yes (PCXR - similar to previous CXR 11/30/20, 

lines/tubes in place)


EKG Reviewed by me: Yes (Tele - SR)





Hospitalist ROS





- Medication


Medications: 


Active Medications











Generic Name Dose Route Start Last Admin





  Trade Name Freq  PRN Reason Stop Dose Admin


 


Acetaminophen  650 mg  11/21/20 17:39  11/27/20 14:13





  Acetaminophen 650 Mg/20.3 Ml Udcup  PO   650 mg





  Q6H PRN   Administration





  Fever > 101 or Mild Pain  


 


Albuterol/Ipratropium  3 ml  11/21/20 19:00  12/01/20 18:16





  Ipratropium/Albuterol Sulfate 3 Ml Neb  NEB   3 ml





  [id number] SANDEE   Administration


 


Amlodipine Besylate  5 mg  11/25/20 09:00  12/01/20 08:25





  Amlodipine 5 Mg Tab  PO   5 mg





  BID SANDEE   Administration


 


Aspirin  81 mg  11/22/20 09:00  12/01/20 08:25





  Aspirin Chewable 81 Mg Tab  PO   81 mg





  DAILY SANDEE   Administration


 


Bisacodyl  10 mg  11/21/20 17:39  11/23/20 05:33





  Bisacodyl 10 Mg Supp  OR   10 mg





  DAILYPRN PRN   Administration





  Constipation  


 


Carvedilol  25 mg  11/27/20 17:00  12/01/20 16:02





  Carvedilol 25 Mg Tab  PO   25 mg





  BID-WM SANDEE   Administration


 


Cyanocobalamin  1,000 mcg  11/23/20 09:00  12/01/20 08:25





  Cyanocobalamin (Vitamin B-12) 1,000 Mcg Tab  PER TUBE   1,000 mcg





  DAILY SANDEE   Administration


 


Enoxaparin Sodium  30 mg  11/24/20 21:00  11/30/20 20:07





  Enoxaparin Sodium 30 Mg/0.3 Ml Syringe  SC   30 mg





  2100 SANDEE   Administration


 


Folic Acid  1 mg  11/23/20 09:00  12/01/20 08:25





  Folic Acid 1 Mg Tab  PER TUBE   1 mg





  DAILY SANDEE   Administration


 


Furosemide  40 mg  12/01/20 09:00  12/01/20 08:25





  Furosemide 40 Mg/4 Ml Vial  SLOW IVP   40 mg





  DAILY SANDEE   Administration


 


Hydralazine HCl  20 mg  11/27/20 14:13  12/01/20 03:19





  Hydralazine 20 Mg/Ml Vial  SLOW IVP   20 mg





  Q4H PRN   Administration





  SBP Greater Than 180  


 


Fentanyl Citrate 2,000 mcg/  100 mls @ 0 mls/hr  11/21/20 19:00  11/25/20 00:28





  Sodium Chloride  IV  12/21/20 19:00  100 mls





  INF SANDEE   Administration





  Protocol  





  Per Protocol  


 


Azithromycin 500 mg/ Sodium  250 mls @ 250 mls/hr  11/24/20 09:00  12/01/20 

09:20





  Chloride  IVPB   250 mls





  0900 SANDEE   Administration


 


Ceftriaxone Sodium 1 gm/  100 mls @ 200 mls/hr  11/24/20 09:00  12/01/20 08:26





  Sodium Chloride  IVPB   100 mls





  0900 SANDEE   Administration


 


Insulin Glargine 20 units/  0.2 mls @ 0.1 mls/hr  11/26/20 21:00  12/01/20 09:19





  Miscellaneous Medication  SC   0.2 mls





  Q12HR SANDEE   Administration


 


Dexmedetomidine HCl 400 mcg/  100 mls @ 0 mls/hr  11/30/20 10:45  11/30/20 20:09





  Sodium Chloride  IVPB   100 mls





  INF SANDEE   Administration





  Protocol  





  Per Protocol  


 


Insulin Human Regular  0 units  11/21/20 17:45  11/22/20 20:36





  Insulin Regular 300 Units/3 Ml Vial  SC   3 units





  .BEDTIME SLIDING SC PRN   Administration





  Bedtime Correctional Scale  


 


Insulin Human Regular  0 units  11/21/20 19:07  11/27/20 18:21





  Insulin Regular 300 Units/3 Ml Vial  SC   3 unit





  .AGGRESSIVE SLIDING  PRN   Administration





  Aggressive Correctional Scale  


 


Labetalol HCl  10 mg  11/21/20 20:15  11/28/20 22:24





  Labetalol Hcl 100 Mg/20 Ml Vial  SLOW IVP   10 mg





  Q4H PRN   Administration





  Systolic BP > 180  


 


Lorazepam  2 mg  11/21/20 19:00  12/01/20 03:13





  Lorazepam 2 Mg/Ml Vial  SLOW IVP  12/21/20 19:00  2 mg





  Q1H PRN   Administration





  Breakthrough agitation  


 


Multivitamins  1 tab  11/23/20 09:00  12/01/20 09:20





  Multivit, Therapeutic 1 Tab  PER TUBE   1 tab





  DAILY SANDEE   Administration


 


Nitroglycerin  0.5 inch  11/28/20 15:15  12/01/20 16:02





  Nitroglycerin 2% Ointment 1 Inch/1 Gm Packet  TOP   0.5 inch





  Q8H SANDEE   Administration


 


Pantoprazole Sodium  40 mg  11/26/20 09:00  12/01/20 08:25





  Pantoprazole 40 Mg Granules Packet  PER TUBE   40 mg





  DAILY SANDEE   Administration


 


Propofol  1,000 mg  11/21/20 19:00  11/30/20 06:07





  Propofol 1,000 Mg/100 Ml Vial  IV  12/21/20 19:00  1,000 mg





  INF PRN   Administration





  TO ACHIEVE GOAL RASS  





  Protocol  


 


Venlafaxine HCl  75 mg  11/28/20 09:00  12/01/20 08:25





  Venlafaxine Hcl 75 Mg Tab  PO   75 mg





  DAILY SANDEE   Administration














- Exam


General Appearance: awake alert


General - other findings: nods/tracks/speaks a few words, holds head up


Eye: PERRL, anicteric sclera


ENT: normocephalic atraumatic, no oropharyngeal lesions


ENT - other findings: ETT in place


Neck: supple, symmetric, no JVD, no thyromegaly, no lymphadenopathy


Heart: RRR, no gallops, no rubs, normal peripheral pulses


Heart - other findings: S1, S2


Respiratory: tachypneic


Respiratory - other findings: diminished in bases, few scattered rhonchi


Gastrointestinal: soft, non-tender, non-distended, normal bowel sounds, no 

palpable masses


Gastrointestinal - other findings: obese


Extremities: no cyanosis, 1+ LE edema


Skin: normal turgor, no lesions


Neurological: cranial nerve grossly intact, no new deficit


Musculoskeletal: normal tone, generalized weakness


Psychiatric: normal affect, oriented to person, oriented to place





Hosp A/P


(1) Acute respiratory failure with hypoxia


Code(s): J96.01 - ACUTE RESPIRATORY FAILURE WITH HYPOXIA   Status: Acute   


Plan: 


Continue [facility] ventilation attempting to wean off completely, likely able to 

extubate in 24h








(2) Acute on chronic diastolic (congestive) heart failure


Code(s): I50.33 - ACUTE ON CHRONIC DIASTOLIC (CONGESTIVE) HEART FAILURE   

Status: Acute   


Plan: 


Approx 30lb weight loss since admit, continue Lasix 40mg IV daily, serial I/O's,

Daily weight








(3) Obesity hypoventilation syndrome


Code(s): E66.2 - MORBID (SEVERE) OBESITY WITH ALVEOLAR HYPOVENTILATION   Status:

Chronic   





(4) GABBY (acute kidney injury)


Code(s): N17.9 - ACUTE KIDNEY FAILURE, UNSPECIFIED   Status: Acute   





(5) CKD (chronic kidney disease), stage III


Code(s): N18.3 - CHRONIC KIDNEY DISEASE, STAGE 3 (MODERATE) * DO NOT USE *   

Status: Chronic   


Qualifiers: 


   Chronic kidney disease stage 3 subtype: stage 3a (GFR 45-59)   Qualified 

Code(s): N18.31 - Chronic kidney disease, stage 3a   





(6) DM type 2 (diabetes mellitus, type 2)


Status: Chronic   


Qualifiers: 


   Diabetes mellitus long term insulin use: with long term use   Diabetes 

mellitus complication status: with kidney complications   Diabetes mellitus 

complication detail: with chronic kidney disease   Chronic kidney disease stage:

stage 3 (moderate) 





(7) Iron deficiency anemia


Code(s): D50.9 - IRON DEFICIENCY ANEMIA, UNSPECIFIED   Status: Chronic   


Qualifiers: 


   Iron deficiency anemia type: inadequate dietary iron intake   Qualified 

Code(s): D50.8 - Other iron deficiency anemias   





- Plan


plan discussed w/ family, continue antibiotics, PT/OT, , 

respiratory therapy, DVT proph w/SCDs


Consults: Palliative Care





Continue critical support


s/p 1u PRBC's


s/p IV Iron infusion 


Continue Lantus 20u BID


Continue Zithromax/Rocephin


Protonix 40mg PT daily


Lasix 40mg IV Daily


Hydralazine 20mg IV q4h PRN 


Wean mech ventilation per protocol, likely extubate in 24h


AM lab: BMP, CBC


PCXR in am

## 2020-12-01 NOTE — PRG
DATE OF SERVICE:  12/01/2020



35 minutes of critical time.



SUBJECTIVE:  The patient remains intubated on mechanical ventilation.  She is much

more awake and interactive than she has been. 



OBJECTIVE:  VITAL SIGNS:  Temperature 98.5, pulse rate 80, blood pressure 160/68, O2

saturation 99%.  She is sedated on a Precedex drip.  Total intake for the last 24

hours was 2950, output 1764 for positive 1186 fluid balance. 

HEENT:  Unremarkable except for the tubes in place. 

NECK:  No JVD. 

LUNGS:  Coarse rhonchi. 

CARDIOVASCULAR:  S1 and S2.  Regular. 

ABDOMEN:  Soft and nontender. 

EXTREMITIES:  Edematous.  Neurologically, she moves all four extremities, but is a

little bit weaker in the left arm. 



LABORATORY DATA:  White blood cell count 9.6, hematocrit 28, and platelet count 330.

 PH 7.41, pCO2 of 55, PO2 of 88 on SIMV rate of 8, tidal volume 450, PEEP __________

FiO2 40%.  Sodium 147, potassium 3.5, chloride 104, CO2 of 32, BUN 55, creatinine

1.4, and glucose 121. 



IMAGING DATA:  Chest x-ray shows cardiomegaly and bilateral pulmonary edema.



ASSESSMENT:  

1. Acute hypoxic and hypercapnic respiratory failure requiring mechanical

ventilation. 

2. Diastolic heart dysfunction.

3. Cerebrovascular accident.

4. Encephalopathy.

5. Renal insufficiency.



PLAN:  

1. We will try on pressure support ventilation and try to slowly wean the pressure

support. 

2. We can probably stop the antibiotics after today.

3. The daughter is leaning towards a compassionate extubation.  Alternative would be

a tracheostomy.  I think the patient probably suffers from obesity hypoventilation

syndrome/sleep apnea that is probably contributing to a large part of her problem.

We will follow with you. 







Job ID:  [id number]

## 2020-12-01 NOTE — PRG
DATE OF SERVICE:  12/01/2020



SUBJECTIVE:  The patient is seen and examined in ICU, talked with the bedside nurse.

 The patient remains intubated, but awake and respond to verbal commands. 



OBJECTIVE:  GENERAL:  This is a morbidly obese female, intubated, seen in ICU. 

VITAL SIGNS:  Temperature 98.5, pulse 80, respiratory rate 18, blood pressure

160/60. 

HEENT:  Intubated. 

CVS:  S1 and S2 heard. 

RESPIRATORY:  Coarse breath sounds. 

GI:  Abdomen is obese. 

MUSCULOSKELETAL:  1 to 2+ edema. 

NEUROLOGIC:  Intubated, but awake, following verbal commands.  No reheard.



LABORATORY DATA:  Sodium 147, potassium 3.5, BUN is 55, creatinine is 1.47.



ASSESSMENT AND PLAN:  

1. Acute kidney injury on chronic kidney disease, stable.  Creatinine remains stable

despite the use of diuretics, which is encouraging. 

2. Hypernatremia, on free water as tolerated.  I do not want to increase the amount

given that she is having diuresis also. 

3. Hypokalemia.  Continue to replace with 40 mEq today.

4. Alkalosis.

5. Cardiorenal syndrome.

6. Fluid overload.

7. Acute hypoxic respiratory failure. 

Replace potassium, free water as tolerated.  Cautious use of diuretics and continue

to talk with family for further goals of care.  We will replace magnesium and

potassium. 







Job ID:  [id number]

## 2020-12-01 NOTE — RAD
XPORTABLE SUPINE CHEST:

 

HISTORY: 

CCU followup on ventilator.

 

COMPARISON: 

11/30/2020.

 

FINDINGS: 

Cardiomegaly.  Vascular engorgement.  Evidence of small bilateral effusions and bibasilar atelectasis
 and/or infiltrates. 

 

Chest findings show no significant interval change.

 

POS: AGW

## 2020-12-01 NOTE — PRG
DATE OF SERVICE:  12/01/2020



SUBJECTIVE:  Ms. Hart's status overnight is unchanged.  She is alert and following

commands, but remains intubated. 



OBJECTIVE:  VITAL SIGNS:  Blood pressure 174/75, pulse 80, respirations 20. 

LUNGS:  Clear to auscultation. 

HEART:  Regular rate and rhythm. 

ABDOMEN:  Soft, nontender, nondistended. 

EXTREMITIES:  No edema.



PERTINENT LABORATORY DATA:  Hemoglobin 8.2.  Creatinine 1.47.



IMPRESSION:  

1. Respiratory failure.

2. Pickwickian syndrome.

3. Diastolic dysfunction.



RECOMMENDATIONS:  At this point, I have no further recommendations to add.  Family

is coming in later in the week with consideration of extubation.  Further

recommendations per Pulmonary.  We will follow peripherally. 







Job ID:  [id number]

## 2020-12-01 NOTE — PDOC.PALCO
Palliative Care Consult





- Consult Details


Requesting Physician: Dr Lind


Reason for Consult: assistance with communication prognosis/disease, family 

support, complex decision-making





- Pertinent HPI





55 year old female who has known chronic heart failure, asthma, obstructive 

sleep apnea, and hypertension. As noted in review of records patient was found 

to have increasing shortness of breath with wheezing increasing in severity over

the past three days.  EMS was called, initially patient was hypertensive and 

after nitro was placed became hypotensive.  CPAP place secondary to poor O2 

saturation, respiratory status continued to decline and patient was intubated.  





Lives independently in the home setting with family, is disabled. Ambulates with

a walker.  





- Pertinent PMH


Morbid obesity, heart failure, COPD, Obstructive sleep apnea, HTN, DM II, 

Chronic anemia, non hodgkins lymphoma in remission, on home O2 secondary to 

chronic hypoxic respiratory failure








- Social History


Smoking Status: Never smoker


Smoking: no tobacco exposure


Alcohol Use: none


Drug Use History: none


Living Situation: independent





- Medications


MAR Reviewed: Yes





- Allergies


Allergies/Adverse Reactions: 


                                    Allergies











Allergy/AdvReac Type Severity Reaction Status Date / Time


 


Penicillins Allergy  Hives Verified 11/26/20 03:23














- Subjective


Intubated, mechanical ventilation.  Light sedation. Opens eyes, makes purposeful

eye contact. 








- ROS


Non Response: due to endotracheal tube, due to mental status





- Objective


Vital Signs: 


                            Vital Signs - Most Recent











Temp Pulse Resp BP Pulse Ox


 


 98.0 F   80   12   178/74 H  97 


 


 12/01/20 12:00  12/01/20 14:29  12/01/20 16:00  12/01/20 14:29  12/01/20 08:00











Palliative Performance Scale: 20





- Physical Exam


Constitutional: ill appearing


HEENT: EOMI, moist MMs, sclera anicteric


Deviation from normal: Bilaterally course, diminished to bases. Intubated


Cardiovascular: RRR


Gastrointestinal: soft, non-tender


Deviation from normal: Obese


Genitourinary: malagon catheter


Musculoskeletal: diffuse muscle atrophy


Neurology: moves all 4 limbs


Skin: cap refill <2 seconds


Deviation from normal: Alert





- Problem List


(1) Palliative care encounter


Code(s): Z51.5 - ENCOUNTER FOR PALLIATIVE CARE   Current Visit: Yes   Status: 

Acute   





(2) GABBY (acute kidney injury)


Code(s): N17.9 - ACUTE KIDNEY FAILURE, UNSPECIFIED   Current Visit: No   Status:

Acute   





(3) Acute on chronic diastolic (congestive) heart failure


Code(s): I50.33 - ACUTE ON CHRONIC DIASTOLIC (CONGESTIVE) HEART FAILURE   

Current Visit: No   Status: Acute   





(4) Acute respiratory failure with hypoxia


Code(s): J96.01 - ACUTE RESPIRATORY FAILURE WITH HYPOXIA   Current Visit: No   

Status: Acute   





(5) DM type 2 (diabetes mellitus, type 2)


Current Visit: No   Status: Chronic   


Qualifiers: 


   Diabetes mellitus long term insulin use: with long term use   Diabetes 

mellitus complication status: with kidney complications   Diabetes mellitus 

complication detail: with chronic kidney disease   Chronic kidney disease stage:

stage 3 (moderate) 





- Plan/Recommendations


Plan:


Palliative Care has communicated with patient children  Pita Gamez and 

Ramez Hart.  They have communicated that their mother has been suffering and 

would not desire to have a Trach or Peg.  Hope is Ms Zuñiga becomes awake and 

alert enough to participate in conversation related to Goal of Care.  





If she does not improve or is able to participate in Goal of Care conversation 

family would elect comfort measures and forgo a Trach/Peg.





The children have elected a DNAR status and completed an OOHDNAR.





Son is to arrive end of the week, at this time they will revisit Goal of care.





Please also refer to Palliative Care notes in note section. 

















[30] minutes spent on this encounter with >50% of the time in counseling and 

coordination of care.





Thank you for this very appropriate consult.

## 2020-12-02 LAB
ANALYZER IN CARDIO: (no result)
ANION GAP SERPL CALC-SCNC: 15 MMOL/L (ref 10–20)
BASE EXCESS STD BLDA CALC-SCNC: 8.5 MEQ/L
BASOPHILS # BLD AUTO: 0.1 THOU/UL (ref 0–0.2)
BASOPHILS NFR BLD AUTO: 0.6 % (ref 0–1)
BUN SERPL-MCNC: 51 MG/DL (ref 9.8–20.1)
CA-I BLDA-SCNC: 1.27 MMOL/L (ref 1.12–1.3)
CALCIUM SERPL-MCNC: 9.6 MG/DL (ref 7.8–10.44)
CHLORIDE SERPL-SCNC: 105 MMOL/L (ref 98–107)
CO2 SERPL-SCNC: 33 MMOL/L (ref 22–29)
CREAT CL PREDICTED SERPL C-G-VRATE: 110 ML/MIN (ref 70–130)
EOSINOPHIL # BLD AUTO: 0.3 THOU/UL (ref 0–0.7)
EOSINOPHIL NFR BLD AUTO: 2.8 % (ref 0–10)
GLUCOSE SERPL-MCNC: 120 MG/DL (ref 70–105)
HCO3 BLDA-SCNC: 34.2 MEQ/L (ref 22–28)
HCT VFR BLDA CALC: 26 % (ref 36–47)
HGB BLD-MCNC: 8.2 G/DL (ref 12–16)
HGB BLDA-MCNC: 8.9 G/DL (ref 12–16)
LYMPHOCYTES # BLD: 1.1 THOU/UL (ref 1.2–3.4)
LYMPHOCYTES NFR BLD AUTO: 10 % (ref 21–51)
MCH RBC QN AUTO: 23.9 PG (ref 27–31)
MCV RBC AUTO: 81.1 FL (ref 78–98)
MONOCYTES # BLD AUTO: 0.5 THOU/UL (ref 0.11–0.59)
MONOCYTES NFR BLD AUTO: 5 % (ref 0–10)
NEUTROPHILS # BLD AUTO: 8.8 THOU/UL (ref 1.4–6.5)
NEUTROPHILS NFR BLD AUTO: 81.6 % (ref 42–75)
O2 A-A PPRESDIFF RESPIRATORY: 118.58 MMHG (ref 0–20)
PCO2 BLDA: 54.5 MMHG (ref 35–45)
PH BLDA: 7.42 [PH] (ref 7.35–7.45)
PLATELET # BLD AUTO: 318 THOU/UL (ref 130–400)
PO2 BLDA: 98.5 MMHG (ref 80–100)
POTASSIUM BLD-SCNC: 3.71 MMOL/L (ref 3.7–5.3)
POTASSIUM SERPL-SCNC: 3.6 MMOL/L (ref 3.5–5.1)
RBC # BLD AUTO: 3.42 MILL/UL (ref 4.2–5.4)
SODIUM SERPL-SCNC: 149 MMOL/L (ref 136–145)
SPECIMEN DRAWN FROM PATIENT: (no result)
WBC # BLD AUTO: 10.8 THOU/UL (ref 4.8–10.8)

## 2020-12-02 RX ADMIN — NITROGLYCERIN SCH INCH: 20 OINTMENT TOPICAL at 16:43

## 2020-12-02 RX ADMIN — THERA TABS SCH TAB: TAB at 09:00

## 2020-12-02 RX ADMIN — NITROGLYCERIN SCH INCH: 20 OINTMENT TOPICAL at 09:07

## 2020-12-02 RX ADMIN — CEFTRIAXONE SCH MLS: 1 INJECTION, POWDER, FOR SOLUTION INTRAMUSCULAR; INTRAVENOUS at 09:02

## 2020-12-02 RX ADMIN — AZITHROMYCIN SCH MLS: 500 INJECTION, POWDER, LYOPHILIZED, FOR SOLUTION INTRAVENOUS at 09:08

## 2020-12-02 RX ADMIN — PANTOPRAZOLE SODIUM SCH MG: 40 GRANULE, DELAYED RELEASE ORAL at 08:59

## 2020-12-02 RX ADMIN — INSULIN GLARGINE SCH: 100 INJECTION, SOLUTION SUBCUTANEOUS at 00:48

## 2020-12-02 RX ADMIN — INSULIN GLARGINE SCH: 100 INJECTION, SOLUTION SUBCUTANEOUS at 22:22

## 2020-12-02 RX ADMIN — CYANOCOBALAMIN TAB 1000 MCG SCH MCG: 1000 TAB at 09:14

## 2020-12-02 RX ADMIN — INSULIN GLARGINE SCH MLS: 100 INJECTION, SOLUTION SUBCUTANEOUS at 09:01

## 2020-12-02 RX ADMIN — ASPIRIN 81 MG CHEWABLE TABLET SCH MG: 81 TABLET CHEWABLE at 08:59

## 2020-12-02 RX ADMIN — NITROGLYCERIN SCH INCH: 20 OINTMENT TOPICAL at 23:15

## 2020-12-02 NOTE — EEG
DATE OF SERVICE:  



DESCRIPTION OF THE RECORD:  The best waking background is a 7 hertz low amplitude

theta frequency.  Majority of the background is in the range of 5 hertz over both

hemispheres.  There is quite a bit of muscle and movement artifact throughout the

study.  Photic stimulation was unremarkable.  No epileptiform features were seen. 



IMPRESSION:  This is an abnormal study for the findings of diffuse slowing

consistent with a diffuse encephalopathic process. 







Job ID:  [id number]

## 2020-12-02 NOTE — PDOC.PALPN
Palliative Progress Note





- Subjective





Extubated, awake, alert. Daughter at bedside. 





- Objective


Vital Signs: 


                            Vital Signs - Most Recent











Temp Pulse Resp BP Pulse Ox


 


 99 F   85   19   154/88 H  97 


 


 12/02/20 08:00  12/02/20 10:00  12/02/20 10:00  12/02/20 08:59  12/02/20 10:00














- Physical Exam


Constitutional: NAD, ill appearing


HEENT: EOMI, moist MMs, sclera anicteric, poor dentition


Respiratory: no wheezing, diminished lung sound


Cardiovascular: RRR, diminished peripheral pulses


Gastrointestinal: soft, non-tender


Deviation from normal: Obese


Genitourinary: malagon catheter


Musculoskeletal: diffuse muscle atrophy


Neurology: moves all 4 limbs, no focal deficits


Skin: cap refill <2 seconds


Psychiatric: A&O x 3





- Assessment


(1) Palliative care encounter


Code(s): Z51.5 - ENCOUNTER FOR PALLIATIVE CARE   Current Visit: Yes   Status: 

Acute   





(2) GABBY (acute kidney injury)


Code(s): N17.9 - ACUTE KIDNEY FAILURE, UNSPECIFIED   Current Visit: No   Status:

Acute   





(3) Acute on chronic diastolic (congestive) heart failure


Code(s): I50.33 - ACUTE ON CHRONIC DIASTOLIC (CONGESTIVE) HEART FAILURE   

Current Visit: No   Status: Acute   





(4) Acute respiratory failure with hypoxia


Code(s): J96.01 - ACUTE RESPIRATORY FAILURE WITH HYPOXIA   Current Visit: No   

Status: Acute   





(5) DM type 2 (diabetes mellitus, type 2)


Current Visit: No   Status: Chronic   


Qualifiers: 


   Diabetes mellitus long term insulin use: with long term use   Diabetes 

mellitus complication status: with kidney complications   Diabetes mellitus 

complication detail: with chronic kidney disease   Chronic kidney disease stage:

stage 3 (moderate) 





- Plan


Plan:


Happy she is no longer intubated.





Discussed that if she desires we will revisit Directive to Physician and  

complete so that her wishes are known.  Ms Hart is agreeable.  





Palliative Care will also revisit Goal of care with patient and family as she 

continues to improve.





Emotional Support


 




















[35] minutes spent on this encounter with >50% of the time in counseling and 

coordination of care.














- ROS


Constitutional: weakness


ENT: dry mouth


Respiratory: shortness of breath


Cardiology: other (Denies chest pain/palpitations)


Gastrointestinal: other (Denies nausea or vomiting)

## 2020-12-02 NOTE — PDOC.HOSPP
- Subjective


Encounter Date: 12/02/20


Encounter Time: 16:55


Subjective: 


f/u for resp failure/CHF s/p mech ventilation with successful extubation today. 

Feels much better and remains on O2 @ 2L/min NC. 





- Objective


Vital Signs & Weight: 


                             Vital Signs (12 hours)











  Temp Pulse Pulse Pulse Resp BP BP


 


 12/02/20 13:14   84    16  


 


 12/02/20 12:00  98.8 F      


 


 12/02/20 10:00   85    19  


 


 12/02/20 08:59   85     154/88 H 


 


 12/02/20 08:45    83  79    156/65 H


 


 12/02/20 08:00  99 F     15  


 


 12/02/20 07:54       


 


 12/02/20 07:24   83     151/89 H 


 


 12/02/20 06:00      14  














  BP Pulse Ox Pulse Ox Pulse Ox


 


 12/02/20 13:14   98  


 


 12/02/20 12:00   96  


 


 12/02/20 10:00   97  


 


 12/02/20 08:59    


 


 12/02/20 08:45  154/68 H   96  98


 


 12/02/20 08:00    


 


 12/02/20 07:54   97  


 


 12/02/20 07:24    


 


 12/02/20 06:00    








                                     Weight











Admit Weight                   361 lb


 


Weight                         334 lb 3.532 oz











                            Most Recent Monitor Data











Heart Rate from ECG            92


 


NIBP                           179/82


 


NIBP BP-Mean                   114


 


Respiration from ECG           18


 


SpO2                           96














I&O: 


                                        











 12/01/20 12/02/20 12/03/20





 06:59 06:59 06:59


 


Intake Total 2950.8 2862.5 1485


 


Output Total 1764 2645 1530


 


Balance 1186.8 217.5 -45











Result Diagrams: 


                                 12/02/20 03:30





                                 12/02/20 03:30


Additional Labs: 


                                   Accuchecks











  12/02/20 12/02/20 12/01/20





  16:15 09:32 23:52


 


POC Glucose  140 H  153 H  114 H














  12/01/20 12/01/20





  20:07 18:09


 


POC Glucose  123 H  136 H








Microbiology





11/30/19 11:40   Stool   Stool Occult Blood (TAWANDA) - Final


11/21/20 14:54   Urine malagon catheter   Urine Culture - Final


                              NO GROWTH AT 48 HOURS





                                Laboratory Tests











  11/30/19 11/30/19 12/01/19





  05:13 13:25 03:51


 


WBC  13.2 H  


 


Hgb  8.8 L  


 


Neutrophils %  91.6 H   74.9


 


Potassium   5.3 H 


 


Carbon Dioxide   


 


BUN   42 H 


 


Creatinine   1.83 H 


 


Iron   


 


Ferritin   


 


TSH 3rd Generation   


 


SARS-CoV-2 Rap RNA(RT-PCR)   














  11/21/20 11/21/20 11/22/20





  16:00 22:06 04:05


 


WBC   


 


Hgb   


 


Neutrophils %   


 


Potassium   


 


Carbon Dioxide   


 


BUN   


 


Creatinine   


 


Iron    11 L


 


Ferritin   


 


TSH 3rd Generation   1.1967 


 


SARS-CoV-2 Rap RNA(RT-PCR)  Not Detected  














  11/22/20 11/22/20 11/23/20





  04:05 11:02 03:55


 


WBC   11.2 H 


 


Hgb   7.0 L 


 


Neutrophils %   


 


Potassium   


 


Carbon Dioxide   


 


BUN   


 


Creatinine    2.27 H


 


Iron   


 


Ferritin  127.01  


 


TSH 3rd Generation   


 


SARS-CoV-2 Rap RNA(RT-PCR)   














  11/23/20 11/24/20 11/24/20





  03:55 03:20 03:30


 


WBC  10.4  13.7 H 


 


Hgb  6.8 L  7.4 L 


 


Neutrophils %   


 


Potassium   


 


Carbon Dioxide   


 


BUN   


 


Creatinine    2.67 H


 


Iron   


 


Ferritin   


 


TSH 3rd Generation   


 


SARS-CoV-2 Rap RNA(RT-PCR)   














  11/25/20 11/25/20 11/26/20





  04:35 04:35 03:55


 


WBC   13.6 H 


 


Hgb   7.2 L 


 


Neutrophils %   


 


Potassium   


 


Carbon Dioxide    30 H


 


BUN    58 H


 


Creatinine  2.72 H   2.46 H


 


Iron   


 


Ferritin   


 


TSH 3rd Generation   


 


SARS-CoV-2 Rap RNA(RT-PCR)   














  11/26/20 11/27/20





  03:55 04:00


 


WBC  


 


Hgb  6.5 L 


 


Neutrophils %  83.9 H  86.2 H


 


Potassium  


 


Carbon Dioxide  


 


BUN  


 


Creatinine  


 


Iron  


 


Ferritin  


 


TSH 3rd Generation  


 


SARS-CoV-2 Rap RNA(RT-PCR)  











Radiology Reviewed by me: Yes (PCXR - vasc prominence, lines/tubes in position)


EKG Reviewed by me: Yes (Tele - SR)





Hospitalist ROS





- Medication


Medications: 


Active Medications











Generic Name Dose Route Start Last Admin





  Trade Name Freq  PRN Reason Stop Dose Admin


 


Acetaminophen  650 mg  11/21/20 17:39  11/27/20 14:13





  Acetaminophen 650 Mg/20.3 Ml Udcup  PO   650 mg





  Q6H PRN   Administration





  Fever > 101 or Mild Pain  


 


Albuterol/Ipratropium  3 ml  11/21/20 19:00  12/02/20 13:14





  Ipratropium/Albuterol Sulfate 3 Ml Neb  NEB   3 ml





  [id number] SANDEE   Administration


 


Amlodipine Besylate  5 mg  11/25/20 09:00  12/02/20 08:59





  Amlodipine 5 Mg Tab  PO   5 mg





  BID SANDEE   Administration


 


Aspirin  81 mg  11/22/20 09:00  12/02/20 08:59





  Aspirin Chewable 81 Mg Tab  PO   81 mg





  DAILY SANDEE   Administration


 


Bisacodyl  10 mg  11/21/20 17:39  11/23/20 05:33





  Bisacodyl 10 Mg Supp  WI   10 mg





  DAILYPRN PRN   Administration





  Constipation  


 


Carvedilol  25 mg  11/27/20 17:00  12/02/20 16:43





  Carvedilol 25 Mg Tab  PO   25 mg





  BID-WM SANDEE   Administration


 


Cyanocobalamin  1,000 mcg  11/23/20 09:00  12/02/20 09:14





  Cyanocobalamin (Vitamin B-12) 1,000 Mcg Tab  PER TUBE   1,000 mcg





  DAILY SANDEE   Administration


 


Enoxaparin Sodium  30 mg  11/24/20 21:00  12/01/20 20:05





  Enoxaparin Sodium 30 Mg/0.3 Ml Syringe  SC   30 mg





  2100 SANDEE   Administration


 


Folic Acid  1 mg  11/23/20 09:00  12/02/20 08:59





  Folic Acid 1 Mg Tab  PER TUBE   1 mg





  DAILY SANDEE   Administration


 


Hydralazine HCl  20 mg  11/27/20 14:13  12/01/20 22:14





  Hydralazine 20 Mg/Ml Vial  SLOW IVP   20 mg





  Q4H PRN   Administration





  SBP Greater Than 180  


 


Insulin Glargine 20 units/  0.2 mls @ 0.1 mls/hr  11/26/20 21:00  12/02/20 09:01





  Miscellaneous Medication  SC   0.2 mls





  Q12HR SANDEE   Administration


 


Insulin Human Regular  0 units  11/21/20 17:45  11/22/20 20:36





  Insulin Regular 300 Units/3 Ml Vial  SC   3 units





  .BEDTIME SLIDING SC PRN   Administration





  Bedtime Correctional Scale  


 


Insulin Human Regular  0 units  11/21/20 19:07  11/27/20 18:21





  Insulin Regular 300 Units/3 Ml Vial  SC   3 unit





  .AGGRESSIVE SLIDING  PRN   Administration





  Aggressive Correctional Scale  


 


Labetalol HCl  10 mg  11/21/20 20:15  11/28/20 22:24





  Labetalol Hcl 100 Mg/20 Ml Vial  SLOW IVP   10 mg





  Q4H PRN   Administration





  Systolic BP > 180  


 


Multivitamins  1 tab  11/23/20 09:00  12/02/20 09:00





  Multivit, Therapeutic 1 Tab  PER TUBE   1 tab





  DAILY SANDEE   Administration


 


Nitroglycerin  0.5 inch  11/28/20 15:15  12/02/20 16:43





  Nitroglycerin 2% Ointment 1 Inch/1 Gm Packet  TOP   0.5 inch





  Q8H SANDEE   Administration


 


Pantoprazole Sodium  40 mg  11/26/20 09:00  12/02/20 08:59





  Pantoprazole 40 Mg Granules Packet  PER TUBE   40 mg





  DAILY SANDEE   Administration


 


Venlafaxine HCl  75 mg  11/28/20 09:00  12/02/20 09:00





  Venlafaxine Hcl 75 Mg Tab  PO   75 mg





  DAILY SANDEE   Administration














- Exam


General Appearance: NAD, awake alert


General - other findings: talkative, smiling


Eye: PERRL, anicteric sclera


ENT: normocephalic atraumatic, no oropharyngeal lesions


Neck: supple, symmetric, no JVD, no thyromegaly, no lymphadenopathy


Heart: RRR, no murmur, no gallops, no rubs, normal peripheral pulses


Heart - other findings: S1, S2


Respiratory: no ronchi, normal chest expansion, no tachypnea


Respiratory - other findings: diminished in bases


Gastrointestinal: soft, non-tender, non-distended, normal bowel sounds, no 

palpable masses


Gastrointestinal - other findings: obese


Extremities: no cyanosis, 1+ LE edema


Skin: normal turgor, no lesions


Neurological: cranial nerve grossly intact, no new deficit


Musculoskeletal: normal tone, generalized weakness


Psychiatric: normal affect, A&O x 3





Hosp A/P


(1) Acute respiratory failure with hypoxia


Code(s): J96.01 - ACUTE RESPIRATORY FAILURE WITH HYPOXIA   Status: Acute   


Plan: 


s/p mech ventilation and successful extubation 12/2/20, continue O2 @ 2L/min NC








(2) Acute on chronic diastolic (congestive) heart failure


Code(s): I50.33 - ACUTE ON CHRONIC DIASTOLIC (CONGESTIVE) HEART FAILURE   

Status: Acute   


Plan: 


Improved, holding Lasix due to GABBY/dehydration/hypernatremia, approx 30lb weight

loss 








(3) Obesity hypoventilation syndrome


Code(s): E66.2 - MORBID (SEVERE) OBESITY WITH ALVEOLAR HYPOVENTILATION   Status:

Chronic   





(4) GABBY (acute kidney injury)


Code(s): N17.9 - ACUTE KIDNEY FAILURE, UNSPECIFIED   Status: Acute   





(5) CKD (chronic kidney disease), stage III


Code(s): N18.3 - CHRONIC KIDNEY DISEASE, STAGE 3 (MODERATE) * DO NOT USE *   

Status: Chronic   


Qualifiers: 


   Chronic kidney disease stage 3 subtype: stage 3a (GFR 45-59)   Qualified 

Code(s): N18.31 - Chronic kidney disease, stage 3a   





(6) DM type 2 (diabetes mellitus, type 2)


Status: Chronic   


Qualifiers: 


   Diabetes mellitus long term insulin use: with long term use   Diabetes 

mellitus complication status: with kidney complications   Diabetes mellitus 

complication detail: with chronic kidney disease   Chronic kidney disease stage:

stage 3 (moderate) 





(7) Iron deficiency anemia


Code(s): D50.9 - IRON DEFICIENCY ANEMIA, UNSPECIFIED   Status: Chronic   


Qualifiers: 


   Iron deficiency anemia type: inadequate dietary iron intake   Qualified 

Code(s): D50.8 - Other iron deficiency anemias   





(8) Hypernatremia


Code(s): E87.0 - HYPEROSMOLALITY AND HYPERNATREMIA   Status: Acute   


Plan: 


Likely iatrogenic due to Lasix, hold diuretics and monitor improvement








- Plan


PT/OT, , respiratory therapy, out of bed/ambulate, DVT proph 

w/SCDs





Continue critical support


s/p 1u PRBC's


s/p IV Iron infusion 


Continue Lantus 20u BID


D/c abx


Protonix 40mg po daily


Lasix held due to GABBY/hypernatremia


Hydralazine 20mg IV q4h PRN


O2 @ 2L/min NC


AM lab: BMP


Likely transition to medical floor in am

## 2020-12-02 NOTE — PRG
DATE OF SERVICE:  12/02/2020



35 minutes critical care time.



SUBJECTIVE:  When I walked in the room this morning, Ms. Hart was talking around

her endotracheal tube without much difficulty.  She was on pressure support

ventilation, but she was warming off because she could never achieve her high

pressure because of the leak. 



OBJECTIVE:  VITAL SIGNS:  Temperature 99.0, pulse rate 76, blood pressure 170/78,

and O2 saturation 98%. 

HEENT:  Unremarkable. 

NECK:  No JVD. 

LUNGS:  Clear. 

CARDIAC:  S1 and S2.  Regular. 

ABDOMEN:  Soft. 

EXTREMITIES:  Edematous.  She is able move all 4 extremities without much difficulty.



LABORATORY DATA:  PH 7.42, pCO2 of 54, PO2 of 98.  White blood cell count 10.8,

hematocrit 27.8, and platelet count 318.  Sodium 149, potassium 3.6, chloride 105,

CO2 of 33, BUN 51, creatinine 1.3, and glucose 120.  Chest x-ray looks like it is

clearing. 



ASSESSMENT:  

1. Acute respiratory failure requiring mechanical ventilation.

2. Morbid obesity.

3. Diastolic heart dysfunction.

4. Renal insufficiency.

5. Mild hypernatremia.



PLAN:  I went ahead and extubated her because.  She was going to have to be

reintubated because the cuff leak.  I wanted to try her without the ventilator

__________ 

and she is doing perfectly fine.  We will initiate oral diet if she can tolerate

that.  Physical therapy is seeing her.  I have stopped all the sedation.  I would

anticipate continued improvement.  I have stopped her antibiotics.  I will go ahead

and hold the diuretics for a day or two given the elevated sodium. 







Job ID:  [id number]

## 2020-12-02 NOTE — RAD
PORTABLE CHEST:

 

HISTORY: 

CCU followup on ventilator.

 

COMPARISON: 

12/1/2020.

 

FINDINGS/IMPRESSION: 

ET tube, NG tube, and central line unchanged.  Cardiomegaly with vascular engorgement.  Soft tissue a
ttenuation from body habitus limits detail.  Small effusions and mild left basilar atelectasis or inf
iltrate cannot be excluded.  No evidence of significant interval change.

 

POS: AGW

## 2020-12-02 NOTE — PRG
DATE OF SERVICE:  12/02/2020



SUBJECTIVE:  The patient was seen and examined in ICU.



OBJECTIVE:  GENERAL:  Morbidly obese female, intubated in ICU. 

VITAL SIGNS:  Temperature 98.9, pulse 89, respiratory rate 18, and blood pressure

151/89. 

HEENT:  Intubated. 

CV:  S1 and S2 heard. __________ 

MUSCULOSKELETAL:  1+ edema. 

NEUROLOGIC:  Intubated.



LABORATORY DATA:  Potassium is 3.6, sodium 149, BUN is 51, and creatinine is 1.3.



ASSESSMENT AND PLAN:  

1. Acute kidney injury on chronic kidney disease, stage 3, stable.

2. Hypernatremia.  Recommend free water if okay with Pulmonology.

3. Hypokalemia, replaced.

4. Cardiorenal syndrome.

5. Alkalosis.

6. Fluid overload.

7. Acute hypoxic respiratory failure. 

On diuretics.  Monitor renal function, which is stable and recommend free water.

Replace potassium. 







Job ID:  [id number]

## 2020-12-03 LAB
ANION GAP SERPL CALC-SCNC: 13 MMOL/L (ref 10–20)
BUN SERPL-MCNC: 41 MG/DL (ref 9.8–20.1)
CALCIUM SERPL-MCNC: 9.4 MG/DL (ref 7.8–10.44)
CHLORIDE SERPL-SCNC: 104 MMOL/L (ref 98–107)
CO2 SERPL-SCNC: 33 MMOL/L (ref 22–29)
CREAT CL PREDICTED SERPL C-G-VRATE: 123 ML/MIN (ref 70–130)
GLUCOSE SERPL-MCNC: 126 MG/DL (ref 70–105)
POTASSIUM SERPL-SCNC: 3.8 MMOL/L (ref 3.5–5.1)
SODIUM SERPL-SCNC: 146 MMOL/L (ref 136–145)

## 2020-12-03 RX ADMIN — THERA TABS SCH TAB: TAB at 09:07

## 2020-12-03 RX ADMIN — INSULIN GLARGINE SCH MLS: 100 INJECTION, SOLUTION SUBCUTANEOUS at 20:24

## 2020-12-03 RX ADMIN — NIFEDIPINE SCH MG: 60 TABLET, EXTENDED RELEASE ORAL at 09:14

## 2020-12-03 RX ADMIN — ASPIRIN 81 MG CHEWABLE TABLET SCH MG: 81 TABLET CHEWABLE at 09:07

## 2020-12-03 RX ADMIN — INSULIN GLARGINE SCH MLS: 100 INJECTION, SOLUTION SUBCUTANEOUS at 09:09

## 2020-12-03 RX ADMIN — CYANOCOBALAMIN TAB 1000 MCG SCH: 1000 TAB at 09:00

## 2020-12-03 RX ADMIN — PANTOPRAZOLE SODIUM SCH MG: 40 GRANULE, DELAYED RELEASE ORAL at 09:06

## 2020-12-03 NOTE — PDOC.HOSPP
- Subjective


Encounter Date: 12/03/20


Encounter Time: 07:00


Subjective: 


Patient seen and examined bedside today, no overnight event, no new complaint, 

patient's blood pressure is running high,





- Objective


Vital Signs & Weight: 


                             Vital Signs (12 hours)











  Temp Pulse Resp BP Pulse Ox


 


 12/03/20 09:14   97   220/106 H 


 


 12/03/20 09:08   97   220/106 H 


 


 12/03/20 08:14   97  22 H   94 L


 


 12/03/20 07:47      98


 


 12/03/20 03:03   104 H   186/90 H 


 


 12/03/20 01:26   104 H  18   94 L


 


 12/03/20 01:04   93   201/94 H 


 


 12/03/20 00:41     190/96 H 


 


 12/03/20 00:00  98.4 F    


 


 12/02/20 23:02   105 H   187/84 H 








                                     Weight











Admit Weight                   361 lb


 


Weight                         332 lb 7.313 oz











                            Most Recent Monitor Data











Heart Rate from ECG            102


 


NIBP                           220/106


 


NIBP BP-Mean                   144


 


Respiration from ECG           22


 


SpO2                           99














I&O: 


                                        











 12/02/20 12/03/20 12/04/20





 06:59 06:59 06:59


 


Intake Total 2862.5 2800 


 


Output Total 2645 2810 60


 


Balance 217.5 -10 -60











Result Diagrams: 


                                 12/02/20 03:30





                                 12/03/20 05:00


Additional Labs: 


                                   Accuchecks











  12/03/20 12/02/20





  08:32 16:15


 


POC Glucose  117 H  140 H











Radiology Reviewed by me: Yes


EKG Reviewed by me: Yes





Hospitalist ROS





- Review of Systems


Constitutional: reports: weakness.  denies: fever, chills, sweats, malaise, 

other


ENT: denies: ear pain, ear discharge, nose pain, nose discharge, nose 

congestion, mouth pain, mouth swelling, throat pain, throat swelling, other


Respiratory: denies: cough, dry, shortness of breath, hemoptysis, SOB with 

excertion, pleuritic pain, sputum, wheezing, other


Cardiovascular: denies: chest pain, palpitations, orthopnea, paroxysmal noc. 

dyspnea, edema, light headedness, other


Gastrointestinal: denies: nausea, vomiting, abdominal pain, diarrhea, 

constipation, melena, hematochezia, other


Genitourinary: denies: dysuria, frequency, incontinence, hematuria, retention, 

other


Musculoskeletal: denies: neck pain, shoulder pain, arm pain, back pain, hand 

pain, leg pain, foot pain, other





- Medication


Medications: 


Active Medications











Generic Name Dose Route Start Last Admin





  Trade Name Freq  PRN Reason Stop Dose Admin


 


Acetaminophen  650 mg  11/21/20 17:39  11/27/20 14:13





  Acetaminophen 650 Mg/20.3 Ml Udcup  PO   650 mg





  Q6H PRN   Administration





  Fever > 101 or Mild Pain  


 


Albuterol/Ipratropium  3 ml  11/21/20 19:00  12/03/20 08:14





  Ipratropium/Albuterol Sulfate 3 Ml Neb  NEB   3 ml





  [id number] SANDEE   Administration


 


Aspirin  81 mg  11/22/20 09:00  12/03/20 09:07





  Aspirin Chewable 81 Mg Tab  PO   81 mg





  DAILY SANDEE   Administration


 


Bisacodyl  10 mg  11/21/20 17:39  11/23/20 05:33





  Bisacodyl 10 Mg Supp  PA   10 mg





  DAILYPRN PRN   Administration





  Constipation  


 


Carvedilol  25 mg  11/27/20 17:00  12/03/20 08:15





  Carvedilol 25 Mg Tab  PO   25 mg





  BID-WM SANDEE   Administration


 


Cyanocobalamin  1,000 mcg  11/23/20 09:00  12/02/20 09:14





  Cyanocobalamin (Vitamin B-12) 1,000 Mcg Tab  PER TUBE   1,000 mcg





  DAILY SANDEE   Administration


 


Enoxaparin Sodium  30 mg  11/24/20 21:00  12/02/20 20:18





  Enoxaparin Sodium 30 Mg/0.3 Ml Syringe  SC   30 mg





  2100 SANDEE   Administration


 


Folic Acid  1 mg  11/23/20 09:00  12/03/20 09:07





  Folic Acid 1 Mg Tab  PER TUBE   1 mg





  DAILY SANDEE   Administration


 


Hydralazine HCl  20 mg  11/27/20 14:13  12/03/20 09:08





  Hydralazine 20 Mg/Ml Vial  SLOW IVP   20 mg





  Q4H PRN   Administration





  SBP Greater Than 180  


 


Hydralazine HCl  25 mg  12/03/20 09:00  12/03/20 09:14





  Hydralazine 25 Mg Tab  PO   25 mg





  QID SANDEE   Administration


 


Insulin Glargine 20 units/  0.2 mls @ 0.1 mls/hr  11/26/20 21:00  12/03/20 09:09





  Miscellaneous Medication  SC   0.2 mls





  Q12HR SANDEE   Administration


 


Insulin Human Regular  0 units  11/21/20 17:45  11/22/20 20:36





  Insulin Regular 300 Units/3 Ml Vial  SC   3 units





  .BEDTIME SLIDING SC PRN   Administration





  Bedtime Correctional Scale  


 


Insulin Human Regular  0 units  11/21/20 19:07  11/27/20 18:21





  Insulin Regular 300 Units/3 Ml Vial  SC   3 unit





  .AGGRESSIVE SLIDING  PRN   Administration





  Aggressive Correctional Scale  


 


Isosorbide Mononitrate  60 mg  12/03/20 09:00  12/03/20 09:15





  Isosorbide Mononitrate Er 60 Mg Tab  PO   60 mg





  DAILY SANDEE   Administration


 


Labetalol HCl  10 mg  11/21/20 20:15  12/03/20 03:03





  Labetalol Hcl 100 Mg/20 Ml Vial  SLOW IVP   10 mg





  Q4H PRN   Administration





  Systolic BP > 180  


 


Multivitamins  1 tab  11/23/20 09:00  12/03/20 09:07





  Multivit, Therapeutic 1 Tab  PER TUBE   1 tab





  DAILY SANDEE   Administration


 


Nifedipine  60 mg  12/03/20 09:00  12/03/20 09:14





  Nifedipine Xl 60 Mg Tab  PO   60 mg





  DAILY SANDEE   Administration


 


Pantoprazole Sodium  40 mg  11/26/20 09:00  12/03/20 09:06





  Pantoprazole 40 Mg Granules Packet  PER TUBE   40 mg





  DAILY SANDEE   Administration


 


Venlafaxine HCl  75 mg  11/28/20 09:00  12/03/20 09:08





  Venlafaxine Hcl 75 Mg Tab  PO   75 mg





  DAILY SANDEE   Administration














- Exam


General Appearance: NAD, awake alert


Eye: PERRL, anicteric sclera


ENT: normocephalic atraumatic, no oropharyngeal lesions


Neck: supple, symmetric, no JVD


Heart: RRR, no murmur, no gallops, no rubs


Respiratory: no wheezes, no rales, no ronchi


Gastrointestinal: soft, non-tender, non-distended, normal bowel sounds


Gastrointestinal - other findings: Morbid obesity noted, Malagon catheter in place


Extremities: no cyanosis, no clubbing, no edema


Skin: normal turgor, no lesions


Neurological: no new deficit


Musculoskeletal: normal tone, normal strength, no muscle wasting


Psychiatric: normal affect, normal behavior, A&O x 3





Hosp A/P


(1) Acute respiratory failure with hypoxia


Code(s): J96.01 - ACUTE RESPIRATORY FAILURE WITH HYPOXIA   Status: Acute   





(2) Acute on chronic diastolic (congestive) heart failure


Code(s): I50.33 - ACUTE ON CHRONIC DIASTOLIC (CONGESTIVE) HEART FAILURE   

Status: Acute   





(3) GABBY (acute kidney injury)


Code(s): N17.9 - ACUTE KIDNEY FAILURE, UNSPECIFIED   Status: Resolved   





(4) CKD (chronic kidney disease), stage III


Code(s): N18.3 - CHRONIC KIDNEY DISEASE, STAGE 3 (MODERATE) * DO NOT USE *   

Status: Chronic   


Qualifiers: 


   Chronic kidney disease stage 3 subtype: stage 3a (GFR 45-59)   Qualified 

Code(s): N18.31 - Chronic kidney disease, stage 3a   





(5) DM type 2 (diabetes mellitus, type 2)


Status: Chronic   


Qualifiers: 


   Diabetes mellitus long term insulin use: with long term use   Diabetes 

mellitus complication status: with kidney complications   Diabetes mellitus 

complication detail: with chronic kidney disease   Chronic kidney disease stage:

stage 3 (moderate) 





(6) HTN (hypertension)


Code(s): I10 - ESSENTIAL (PRIMARY) HYPERTENSION   Status: Chronic   


Qualifiers: 


   Hypertension type: essential hypertension 





(7) Obesity hypoventilation syndrome


Code(s): E66.2 - MORBID (SEVERE) OBESITY WITH ALVEOLAR HYPOVENTILATION   Status:

Chronic   





(8) Vitamin D deficiency


Code(s): E55.9 - VITAMIN D DEFICIENCY, UNSPECIFIED   Status: Chronic   





(9) VANE (obstructive sleep apnea)


Code(s): G47.33 - OBSTRUCTIVE SLEEP APNEA (ADULT) (PEDIATRIC)   Status: 

Suspected   





(10) Iron deficiency anemia


Code(s): D50.9 - IRON DEFICIENCY ANEMIA, UNSPECIFIED   Status: Chronic   


Qualifiers: 


   Iron deficiency anemia type: inadequate dietary iron intake   Qualified 

Code(s): D50.8 - Other iron deficiency anemias   





- Plan


old records reviewed/req, malagon catheter, PT/OT, 





Plan





Patient's blood pressure is not well controlled, today I have discontinued 

amlodipine and instead I have started Procardia XL 60 mg p.o. daily


I have discontinued Nitropatch and started Imdur 60 mg p.o. daily


Continue Coreg


We will also start hydralazine 25 mg p.o. 4 times daily


Patient is plan for transfer to medical floor


After that patient will need PT OT evaluation and will consider Malagon catheter 

removal depending upon her ambulation


Medication reviewed and continue to provide symptomatic and supportive care


Her renal function has been stabilized to baseline level,

## 2020-12-03 NOTE — PRG
DATE OF SERVICE:  12/03/2020



SUBJECTIVE:  The patient is doing great today.  I extubated her without difficulty

yesterday.  She has been able to eat and has no complaints. 



OBJECTIVE:  VITAL SIGNS:  Her temperature is 98.4, pulse 95, blood pressure 172/70,

O2 saturation 99%. 

HEENT: Unremarkable. 

NECK: No JVD. 

LUNGS:  Clear anteriorly. 

CARDIAC: S1, S2.  Regular. 

ABDOMEN:  Soft. 

EXTREMITIES:  No edema.



LABORATORY DATA:  Sodium 146, potassium of 3.8, BUN 41, creatinine 1.2, glucose 126.



ASSESSMENT:  

1. Status post prolonged respiratory failure, requiring mechanical ventilation.

2. Recovery of left hemiparesis.

3. Morbid obesity.

4. Diastolic heart dysfunction.

5. Renal insufficiency, which has improved.

6. Hypertension.

7. Mild hyponatremia, which has also improved.



PLAN:  She can be transferred to the medical floor.  I have started her back on the

lisinopril, but her chemistry will need to be checked in a couple of days to make

sure that this does not affect her BUN and creatinine.  Main issue now is

rehabilitative in nature.  Pulmonary will be available as needed. 







Job ID:  [id number]

## 2020-12-04 LAB
ANION GAP SERPL CALC-SCNC: 14 MMOL/L (ref 10–20)
BUN SERPL-MCNC: 35 MG/DL (ref 9.8–20.1)
CALCIUM SERPL-MCNC: 9.3 MG/DL (ref 7.8–10.44)
CHLORIDE SERPL-SCNC: 101 MMOL/L (ref 98–107)
CO2 SERPL-SCNC: 32 MMOL/L (ref 22–29)
CREAT CL PREDICTED SERPL C-G-VRATE: 119 ML/MIN (ref 70–130)
GLUCOSE SERPL-MCNC: 114 MG/DL (ref 70–105)
POTASSIUM SERPL-SCNC: 3.8 MMOL/L (ref 3.5–5.1)
SODIUM SERPL-SCNC: 143 MMOL/L (ref 136–145)

## 2020-12-04 PROCEDURE — 5A09457 ASSISTANCE WITH RESPIRATORY VENTILATION, 24-96 CONSECUTIVE HOURS, CONTINUOUS POSITIVE AIRWAY PRESSURE: ICD-10-PCS | Performed by: INTERNAL MEDICINE

## 2020-12-04 RX ADMIN — ASPIRIN 81 MG CHEWABLE TABLET SCH MG: 81 TABLET CHEWABLE at 08:23

## 2020-12-04 RX ADMIN — NITROGLYCERIN SCH: 20 OINTMENT TOPICAL at 07:24

## 2020-12-04 RX ADMIN — PANTOPRAZOLE SODIUM SCH MG: 40 GRANULE, DELAYED RELEASE ORAL at 08:24

## 2020-12-04 RX ADMIN — CYANOCOBALAMIN TAB 1000 MCG SCH MCG: 1000 TAB at 08:25

## 2020-12-04 RX ADMIN — INSULIN GLARGINE SCH MLS: 100 INJECTION, SOLUTION SUBCUTANEOUS at 22:46

## 2020-12-04 RX ADMIN — INSULIN GLARGINE SCH MLS: 100 INJECTION, SOLUTION SUBCUTANEOUS at 08:26

## 2020-12-04 RX ADMIN — THERA TABS SCH TAB: TAB at 08:26

## 2020-12-04 RX ADMIN — NIFEDIPINE SCH MG: 60 TABLET, EXTENDED RELEASE ORAL at 08:24

## 2020-12-04 NOTE — PDOC.PALPN
Palliative Progress Note





- Subjective





Resting comfortably, O2 via nc.  Arousable but lethargic.  Denies any 

complaints. 





- Objective


Vital Signs: 


                            Vital Signs - Most Recent











Temp Pulse Resp BP Pulse Ox


 


 98.2 F   86   16   181/82 H  98 


 


 12/04/20 07:16  12/04/20 08:25  12/04/20 08:07  12/04/20 08:26  12/04/20 08:07














- Physical Exam


Constitutional: NAD


HEENT: EOMI, moist MMs, sclera anicteric


Cardiovascular: RRR


Gastrointestinal: soft, non-tender


Deviation from normal: Obese


Genitourinary: malagon catheter


Musculoskeletal: pulses present


Neurology: moves all 4 limbs, no focal deficits


Skin: cap refill <2 seconds


Psychiatric: A&O x 3





- Assessment


(1) Palliative care encounter


Code(s): Z51.5 - ENCOUNTER FOR PALLIATIVE CARE   Current Visit: Yes   Status: 

Acute   





(2) Acute on chronic diastolic (congestive) heart failure


Code(s): I50.33 - ACUTE ON CHRONIC DIASTOLIC (CONGESTIVE) HEART FAILURE   

Current Visit: No   Status: Acute   





(3) Acute respiratory failure with hypoxia


Code(s): J96.01 - ACUTE RESPIRATORY FAILURE WITH HYPOXIA   Current Visit: No   

Status: Acute   





(4) DM type 2 (diabetes mellitus, type 2)


Current Visit: No   Status: Chronic   


Qualifiers: 


   Diabetes mellitus long term insulin use: with long term use   Diabetes 

mellitus complication status: with kidney complications   Diabetes mellitus 

complication detail: with chronic kidney disease   Chronic kidney disease stage:

stage 3 (moderate) 





- Plan


Plan:


Plan to have rehab evaluation, with hopeful transition to rehab then home.  

Discussed with patient and previously daughter consideration of home health 

after rehab.





Continue with DNAR.





Directives complete, MPOA.





Palliative Care will follow at a distance as Goal of care also identified, 

seeking Rehab eval.  If not able to transition to rehab will assist in 

revisiting Goal of care.




















[35] minutes spent on this encounter with >50% of the time in counseling and 

coordination of care.














- ROS


Constitutional: weakness


ENT: other (Denies throat irritation)


Respiratory: other (Denies short of breath, cough)


Gastrointestinal: other (Negative fo rnausea, vomiting)


Skin: other (Negative for rash, puritis)

## 2020-12-04 NOTE — PDOC.HOSPP
- Subjective


Encounter Date: 12/04/20


Encounter Time: 07:40


Subjective: 


Patient seen and examined bedside today, this morning patient was appeared 

sleepy, no overnight event, she does not have any fever or chills or any 

headache,





- Objective


Vital Signs & Weight: 


                             Vital Signs (12 hours)











  Temp Pulse Resp BP BP Pulse Ox


 


 12/04/20 08:26     181/82 H  


 


 12/04/20 08:25   86    


 


 12/04/20 08:24   86    


 


 12/04/20 08:07   86  16    98


 


 12/04/20 07:16  98.2 F  86  22 H   185/70 H  98


 


 12/04/20 04:58  97.5 F L  86  18   152/76 H  100


 


 12/04/20 00:12   90  16    97


 


 12/03/20 23:45  97.9 F  87  18   166/82 H  95








                                     Weight











Admit Weight                   361 lb


 


Weight                         332 lb 8 oz











                            Most Recent Monitor Data











Heart Rate from ECG            98


 


NIBP                           145/75


 


NIBP BP-Mean                   98


 


Respiration from ECG           20


 


SpO2                           99














I&O: 


                                        











 12/03/20 12/04/20 12/05/20





 06:59 06:59 06:59


 


Intake Total 2800 464 


 


Output Total 2810 470 


 


Balance -10 -6 











Result Diagrams: 


                                 12/02/20 03:30





                                 12/04/20 08:11


Additional Labs: 


                                   Accuchecks











  12/04/20 12/03/20 12/03/20





  04:47 20:22 16:33


 


POC Glucose  103 H  138 H  130 H














  12/03/20 12/02/20





  11:19 20:22


 


POC Glucose  134 H  100














Hospitalist ROS





- Review of Systems


Constitutional: reports: weakness.  denies: fever, chills, sweats, malaise, 

other


ENT: denies: ear pain, ear discharge, nose pain, nose discharge, nose 

congestion, mouth pain, mouth swelling, throat pain, throat swelling, other


Respiratory: denies: cough, dry, shortness of breath, hemoptysis, SOB with 

excertion, pleuritic pain, sputum, wheezing, other


Cardiovascular: denies: chest pain, palpitations, orthopnea, paroxysmal noc. 

dyspnea, edema, light headedness, other


Gastrointestinal: denies: nausea, vomiting, abdominal pain, diarrhea, 

constipation, melena, hematochezia, other


Genitourinary: denies: dysuria, frequency, incontinence, hematuria, retention, 

other





- Medication


Medications: 


Active Medications











Generic Name Dose Route Start Last Admin





  Trade Name Freq  PRN Reason Stop Dose Admin


 


Acetaminophen  650 mg  11/21/20 17:39  11/27/20 14:13





  Acetaminophen 650 Mg/20.3 Ml Udcup  PO   650 mg





  Q6H PRN   Administration





  Fever > 101 or Mild Pain  


 


Acetaminophen  650 mg  11/21/20 20:17  12/04/20 01:36





  Acetaminophen 325 Mg Tab  PO   650 mg





  Q4H PRN   Administration





  Headache/Fever/Mild Pain (1-3)  


 


Albuterol/Ipratropium  3 ml  11/21/20 19:00  12/04/20 08:07





  Ipratropium/Albuterol Sulfate 3 Ml Neb  NEB   3 ml





  [id number] SANDEE   Administration


 


Aspirin  81 mg  11/22/20 09:00  12/04/20 08:23





  Aspirin Chewable 81 Mg Tab  PO   81 mg





  DAILY SANDEE   Administration


 


Bisacodyl  10 mg  11/21/20 17:39  11/23/20 05:33





  Bisacodyl 10 Mg Supp  CO   10 mg





  DAILYPRN PRN   Administration





  Constipation  


 


Carvedilol  25 mg  11/27/20 17:00  12/04/20 08:26





  Carvedilol 25 Mg Tab  PO   25 mg





  BID-WM SANDEE   Administration


 


Cyanocobalamin  1,000 mcg  11/23/20 09:00  12/04/20 08:25





  Cyanocobalamin (Vitamin B-12) 1,000 Mcg Tab  PER TUBE   1,000 mcg





  DAILY SANDEE   Administration


 


Enoxaparin Sodium  40 mg  12/03/20 21:00  12/03/20 20:24





  Enoxaparin Sodium 40 Mg/0.4 Ml Syringe  SC   40 mg





  2100 SANDEE   Administration


 


Folic Acid  1 mg  11/23/20 09:00  12/04/20 08:24





  Folic Acid 1 Mg Tab  PER TUBE   1 mg





  DAILY SANDEE   Administration


 


Hydralazine HCl  20 mg  11/27/20 14:13  12/03/20 09:08





  Hydralazine 20 Mg/Ml Vial  SLOW IVP   20 mg





  Q4H PRN   Administration





  SBP Greater Than 180  


 


Hydralazine HCl  25 mg  12/03/20 09:00  12/04/20 08:25





  Hydralazine 25 Mg Tab  PO   25 mg





  QID SANDEE   Administration


 


Insulin Glargine 20 units/  0.2 mls @ 0.1 mls/hr  11/26/20 21:00  12/04/20 08:26





  Miscellaneous Medication  SC   0.2 mls





  Q12HR SANDEE   Administration


 


Insulin Human Regular  0 units  11/21/20 17:45  11/22/20 20:36





  Insulin Regular 300 Units/3 Ml Vial  SC   3 units





  .BEDTIME SLIDING SC PRN   Administration





  Bedtime Correctional Scale  


 


Insulin Human Regular  0 units  11/21/20 19:07  11/27/20 18:21





  Insulin Regular 300 Units/3 Ml Vial  SC   3 unit





  .AGGRESSIVE SLIDING  PRN   Administration





  Aggressive Correctional Scale  


 


Isosorbide Mononitrate  60 mg  12/03/20 09:00  12/04/20 08:26





  Isosorbide Mononitrate Er 60 Mg Tab  PO   60 mg





  DAILY SANDEE   Administration


 


Labetalol HCl  10 mg  11/21/20 20:15  12/03/20 03:03





  Labetalol Hcl 100 Mg/20 Ml Vial  SLOW IVP   10 mg





  Q4H PRN   Administration





  Systolic BP > 180  


 


Lisinopril  20 mg  12/04/20 09:00  12/04/20 08:26





  Lisinopril 20 Mg Tab  PO   20 mg





  DAILY SANDEE   Administration


 


Multivitamins  1 tab  11/23/20 09:00  12/04/20 08:26





  Multivit, Therapeutic 1 Tab  PER TUBE   1 tab





  DAILY SANDEE   Administration


 


Nifedipine  60 mg  12/03/20 09:00  12/04/20 08:24





  Nifedipine Xl 60 Mg Tab  PO   60 mg





  DAILY SANDEE   Administration


 


Pantoprazole Sodium  40 mg  11/26/20 09:00  12/04/20 08:24





  Pantoprazole 40 Mg Granules Packet  PER TUBE   40 mg





  DAILY SANDEE   Administration


 


Sodium Chloride  10 ml  11/21/20 17:39  12/03/20 20:25





  Flush - Normal Saline 10 Ml Syringe  IVF   10 ml





  PRN PRN   Administration





  Saline Flush  


 


Venlafaxine HCl  75 mg  11/28/20 09:00  12/04/20 08:26





  Venlafaxine Hcl 75 Mg Tab  PO   75 mg





  DAILY SANDEE   Administration














- Exam


General Appearance: NAD, awake alert


Eye: PERRL


ENT: normocephalic atraumatic, no oropharyngeal lesions


Neck: symmetric, no JVD


Neck - other findings: Short neck


Heart: RRR, no murmur, no gallops, no rubs


Respiratory: no wheezes, no rales, no ronchi


Respiratory - other findings: Morbid obesity limiting examination


Gastrointestinal: soft, non-tender, non-distended, normal bowel sounds


Gastrointestinal - other findings: Obesity


Extremities: no clubbing, no edema


Skin: normal turgor, no lesions


Neurological: no focal deficits


Musculoskeletal: normal tone, normal strength


Psychiatric: normal affect, normal behavior





Hosp A/P


(1) Acute respiratory failure with hypoxia


Code(s): J96.01 - ACUTE RESPIRATORY FAILURE WITH HYPOXIA   Status: Acute   





(2) Acute on chronic diastolic (congestive) heart failure


Code(s): I50.33 - ACUTE ON CHRONIC DIASTOLIC (CONGESTIVE) HEART FAILURE   

Status: Acute   





(3) GABBY (acute kidney injury)


Code(s): N17.9 - ACUTE KIDNEY FAILURE, UNSPECIFIED   Status: Resolved   





(4) CKD (chronic kidney disease), stage III


Code(s): N18.3 - CHRONIC KIDNEY DISEASE, STAGE 3 (MODERATE) * DO NOT USE *   

Status: Chronic   


Qualifiers: 


   Chronic kidney disease stage 3 subtype: stage 3a (GFR 45-59)   Qualified 

Code(s): N18.31 - Chronic kidney disease, stage 3a   





(5) DM type 2 (diabetes mellitus, type 2)


Status: Chronic   


Qualifiers: 


   Diabetes mellitus long term insulin use: with long term use   Diabetes 

mellitus complication status: with kidney complications   Diabetes mellitus 

complication detail: with chronic kidney disease   Chronic kidney disease stage:

stage 3 (moderate) 





(6) HTN (hypertension)


Code(s): I10 - ESSENTIAL (PRIMARY) HYPERTENSION   Status: Chronic   


Qualifiers: 


   Hypertension type: essential hypertension 





(7) Obesity hypoventilation syndrome


Code(s): E66.2 - MORBID (SEVERE) OBESITY WITH ALVEOLAR HYPOVENTILATION   Status:

Chronic   





(8) Vitamin D deficiency


Code(s): E55.9 - VITAMIN D DEFICIENCY, UNSPECIFIED   Status: Chronic   





(9) VANE (obstructive sleep apnea)


Code(s): G47.33 - OBSTRUCTIVE SLEEP APNEA (ADULT) (PEDIATRIC)   Status: 

Suspected   





(10) Iron deficiency anemia


Code(s): D50.9 - IRON DEFICIENCY ANEMIA, UNSPECIFIED   Status: Chronic   


Qualifiers: 


   Iron deficiency anemia type: inadequate dietary iron intake   Qualified 

Code(s): D50.8 - Other iron deficiency anemias   





- Plan


old records reviewed/req, PT/OT, DVT proph w/lovenox





Plan





Blood pressure is better controlled with the current regimen, continue Procardia

XL, Imdur 60, Coreg and hydralazine, lisinopril added today


Continue PT OT


DC Silveira catheter


CPAP while at sleep


Outpatient sleep study


Rehab evaluation

## 2020-12-04 NOTE — PRG
DATE OF SERVICE:  12/04/2020



SUBJECTIVE:  Patient was seen and examined at bedside and overnight events noted.

Patient denies any shortness of breath or chest pain or palpitation.  No history of

nausea or vomiting or diarrhea or fever or chills or cramps. 



OBJECTIVE:  GENERAL:  This is a well-built female, in no apparent distress. 

VITAL SIGNS:  Temperature 98.2, heart rate 86, respiratory rate 18, and blood

pressure 152/76. 

HEENT:  Atraumatic, normocephalic. Oral mucosa is moist. 

NECK:  Supple. 

CARDIOVASCULAR:  S1, S2 heard.  Rate and rhythm regular. 

RESPIRATORY:  Clear to auscultation. 

GASTROINTESTINAL:  Abdomen is soft. 

MUSCULOSKELETAL:  No tenderness.  No edema. 

DERMATOLOGIC:  No skin rash. 

NEUROLOGIC:  Alert and awake and oriented x3.  No focal neurologic deficits. Moving

all the extremities. 

PSYCHIATRIC:  Mood and affect normal.



LABORATORY DATA:  Not done today.



ASSESSMENT AND PLAN:  

1. Acute kidney injury on chronic kidney disease stage 3.  We will monitor labs.

2. Hypernatremia.

3. Alkalosis.

4. Edema.

5. Cardiorenal syndrome.

6. Acute hypoxic respiratory failure, extubated.

7. We will recheck labs.







Job ID:  [id number]

## 2020-12-05 RX ADMIN — PANTOPRAZOLE SODIUM SCH MG: 40 GRANULE, DELAYED RELEASE ORAL at 08:29

## 2020-12-05 RX ADMIN — ASPIRIN 81 MG CHEWABLE TABLET SCH MG: 81 TABLET CHEWABLE at 08:27

## 2020-12-05 RX ADMIN — NIFEDIPINE SCH MG: 60 TABLET, EXTENDED RELEASE ORAL at 08:27

## 2020-12-05 RX ADMIN — INSULIN GLARGINE SCH MLS: 100 INJECTION, SOLUTION SUBCUTANEOUS at 20:57

## 2020-12-05 RX ADMIN — THERA TABS SCH TAB: TAB at 08:29

## 2020-12-05 RX ADMIN — CYANOCOBALAMIN TAB 1000 MCG SCH MCG: 1000 TAB at 08:30

## 2020-12-05 RX ADMIN — INSULIN GLARGINE SCH MLS: 100 INJECTION, SOLUTION SUBCUTANEOUS at 08:30

## 2020-12-05 NOTE — PDOC.HOSPP
- Subjective


Encounter Date: 12/05/20


Encounter Time: 13:45


Subjective: 


Patient seen and examined for respiratory failure/hypertensive crisis.  Denies 

any new complaints.  No chest pain, shortness of breath at rest or palpitations 

reported.  No new focal deficit.





- Objective


Vital Signs & Weight: 


                             Vital Signs (12 hours)











  Temp Pulse Resp BP BP Pulse Ox


 


 12/05/20 17:12   92    


 


 12/05/20 15:58  97.6 F  92  20   151/70 H  100


 


 12/05/20 14:15   85    


 


 12/05/20 11:22  98.0 F  85  20   157/78 H  95


 


 12/05/20 08:31  99.1 F  93  20   156/77 H  94 L


 


 12/05/20 08:28   93    


 


 12/05/20 08:27   93   160/72 H  


 


 12/05/20 08:00       94 L


 


 12/05/20 07:48   93  16   








                                     Weight











Admit Weight                   361 lb


 


Weight                         330 lb 14.621 oz











                            Most Recent Monitor Data











Heart Rate from ECG            98


 


NIBP                           145/75


 


NIBP BP-Mean                   98


 


Respiration from ECG           20


 


SpO2                           99














I&O: 


                                        











 12/04/20 12/05/20 12/06/20





 06:59 06:59 06:59


 


Intake Total 464  


 


Output Total 470 300 


 


Balance -6 -300 











Result Diagrams: 


                                 12/02/20 03:30





                                 12/04/20 08:11


Additional Labs: 


                                   Accuchecks











  12/05/20 12/05/20 12/05/20





  15:59 11:21 04:18


 


POC Glucose  156 H  106 H  89














  12/04/20





  20:11


 


POC Glucose  134 H











Radiology Reviewed by me: Yes (Last chest x-ray reviewed)





Hospitalist ROS





- Review of Systems


Cardiovascular: denies: chest pain, palpitations, orthopnea, paroxysmal noc. 

dyspnea, edema, light headedness, other


Gastrointestinal: denies: nausea, vomiting, abdominal pain, diarrhea, 

constipation, melena, hematochezia, other





- Medication


Medications: 


Active Medications











Generic Name Dose Route Start Last Admin





  Trade Name Freq  PRN Reason Stop Dose Admin


 


Acetaminophen  650 mg  11/21/20 17:39  11/27/20 14:13





  Acetaminophen 650 Mg/20.3 Ml Udcup  PO   650 mg





  Q6H PRN   Administration





  Fever > 101 or Mild Pain  


 


Acetaminophen  650 mg  11/21/20 20:17  12/05/20 08:28





  Acetaminophen 325 Mg Tab  PO   650 mg





  Q4H PRN   Administration





  Headache/Fever/Mild Pain (1-3)  


 


Albuterol/Ipratropium  3 ml  11/21/20 19:00  12/05/20 14:39





  Ipratropium/Albuterol Sulfate 3 Ml Neb  NEB   Not Given





  [id number] SANDEE  


 


Aspirin  81 mg  11/22/20 09:00  12/05/20 08:27





  Aspirin Chewable 81 Mg Tab  PO   81 mg





  DAILY SANDEE   Administration


 


Bisacodyl  10 mg  11/21/20 17:39  11/23/20 05:33





  Bisacodyl 10 Mg Supp  MD   10 mg





  DAILYPRN PRN   Administration





  Constipation  


 


Carvedilol  25 mg  11/27/20 17:00  12/05/20 17:12





  Carvedilol 25 Mg Tab  PO   25 mg





  BID-WM SANDEE   Administration


 


Cyanocobalamin  1,000 mcg  11/23/20 09:00  12/05/20 08:30





  Cyanocobalamin (Vitamin B-12) 1,000 Mcg Tab  PER TUBE   1,000 mcg





  DAILY SANDEE   Administration


 


Enoxaparin Sodium  40 mg  12/03/20 21:00  12/04/20 22:46





  Enoxaparin Sodium 40 Mg/0.4 Ml Syringe  SC   40 mg





  2100 SANDEE   Administration


 


Folic Acid  1 mg  11/23/20 09:00  12/05/20 08:27





  Folic Acid 1 Mg Tab  PER TUBE   1 mg





  DAILY SANDEE   Administration


 


Hydralazine HCl  20 mg  11/27/20 14:13  12/03/20 09:08





  Hydralazine 20 Mg/Ml Vial  SLOW IVP   20 mg





  Q4H PRN   Administration





  SBP Greater Than 180  


 


Hydralazine HCl  25 mg  12/03/20 09:00  12/05/20 17:12





  Hydralazine 25 Mg Tab  PO   25 mg





  QID SANDEE   Administration


 


Insulin Glargine 20 units/  0.2 mls @ 0.1 mls/hr  11/26/20 21:00  12/05/20 08:30





  Miscellaneous Medication  SC   0.2 mls





  Q12HR SANDEE   Administration


 


Insulin Human Regular  0 units  11/21/20 17:45  11/22/20 20:36





  Insulin Regular 300 Units/3 Ml Vial  SC   3 units





  .BEDTIME SLIDING SC PRN   Administration





  Bedtime Correctional Scale  


 


Insulin Human Regular  0 units  11/21/20 19:07  11/27/20 18:21





  Insulin Regular 300 Units/3 Ml Vial  SC   3 unit





  .AGGRESSIVE SLIDING  PRN   Administration





  Aggressive Correctional Scale  


 


Isosorbide Mononitrate  60 mg  12/03/20 09:00  12/05/20 08:29





  Isosorbide Mononitrate Er 60 Mg Tab  PO   60 mg





  DAILY SANDEE   Administration


 


Labetalol HCl  10 mg  11/21/20 20:15  12/03/20 03:03





  Labetalol Hcl 100 Mg/20 Ml Vial  SLOW IVP   10 mg





  Q4H PRN   Administration





  Systolic BP > 180  


 


Lisinopril  20 mg  12/04/20 09:00  12/05/20 08:27





  Lisinopril 20 Mg Tab  PO   20 mg





  DAILY SANDEE   Administration


 


Multivitamins  1 tab  11/23/20 09:00  12/05/20 08:29





  Multivit, Therapeutic 1 Tab  PER TUBE   1 tab





  DAILY SANDEE   Administration


 


Nifedipine  60 mg  12/03/20 09:00  12/05/20 08:27





  Nifedipine Xl 60 Mg Tab  PO   60 mg





  DAILY SANDEE   Administration


 


Pantoprazole Sodium  40 mg  11/26/20 09:00  12/05/20 08:29





  Pantoprazole 40 Mg Granules Packet  PER TUBE   40 mg





  DAILY SANDEE   Administration


 


Sodium Chloride  10 ml  11/21/20 17:39  12/03/20 20:25





  Flush - Normal Saline 10 Ml Syringe  IVF   10 ml





  PRN PRN   Administration





  Saline Flush  


 


Venlafaxine HCl  75 mg  11/28/20 09:00  12/05/20 08:30





  Venlafaxine Hcl 75 Mg Tab  PO   75 mg





  DAILY SANDEE   Administration














- Exam


General Appearance: NAD


Heart: RRR, no gallops


Respiratory: no wheezes, no ronchi


Gastrointestinal: non-tender, normal bowel sounds


Extremities: no cyanosis, no clubbing





Hosp A/P





- Plan


DVT proph w/SCDs





Acute hypoxic and hypercapnic respiratory failure status post mechanical venti

lation


Acute on chronic diastolic heart failure exacerbation


Acute asthma exacerbation


Hypertension with hypertensive urgency on admission


GABBY on CKD stage III


Diabetes mellitus type 2


Hypomagnesemia/hypokalemia


Obstructive sleep apnea


Morbid obesity with a BMI 51.2


Chronic anemia with hemoglobin 8.1 due to iron deficiency


Hyperlipidemia


Anxiety


Penicillin allergy





Plan:


Continue current dose of lisinopril, carvedilol, hydralazine, Procardia XL and 

isosorbide mononitrate.  Continue current dose of Lantus with sliding scale.  

Patient will require a sleep study as outpatient.  Basic metabolic profile in 

a.m.  Continue with therapy.  Await placement

## 2020-12-05 NOTE — EKG
Test Reason : 

Blood Pressure : ***/*** mmHG

Vent. Rate : 073 BPM     Atrial Rate : 073 BPM

   P-R Int : 136 ms          QRS Dur : 078 ms

    QT Int : 410 ms       P-R-T Axes : 073 067 053 degrees

   QTc Int : 451 ms

 

Normal sinus rhythm

Normal ECG

 

Confirmed by THOMAS GARDNER DO ([id number]),  VAIBHAV SILVA ([id number]) on 12/5/2020 2:26:19 PM

 

Referred By:             Confirmed By:THOMAS GARDNER DO

## 2020-12-05 NOTE — PRG
DATE OF SERVICE:  12/05/2020



SUBJECTIVE:  Patient was seen and examined at bedside and overnight events noted.

Patient denies any shortness of breath or chest pain or palpitation.  No history of

nausea or vomiting or diarrhea or fever or chills or cramps. 



OBJECTIVE:  GENERAL:  This is a morbidly obese female, in no apparent distress. 

VITAL SIGNS:  Temperature __________.  Heart Rate 93.  Respiratory rate __________.

Blood pressure 156/77. 

HEENT:  Atraumatic, normocephalic. Oral mucosa is moist. 

NECK:  Supple. 

CARDIOVASCULAR:  S1, S2 heard.  Rate and rhythm regular. 

RESPIRATORY:  Clear to auscultation. 

GASTROINTESTINAL:  Abdomen is soft. 

MUSCULOSKELETAL:  No tenderness.  No edema. 

DERMATOLOGIC:  No skin rash. 

NEUROLOGIC:  Alert and awake and oriented x3.  No focal neurologic deficits. Moving

all the extremities. 

PSYCHIATRIC:  Mood and affect normal.



LABORATORY DATA:  No labs done today.



ASSESSMENT AND PLAN:  

1. Acute kidney injury on chronic kidney disease stage 3, stable.

2. Hypernatremia.

3. Alkalosis.

4. Edema.

5. Cardiorenal syndrome.

6. Acute hypoxic respiratory failure, extubated.

7. Labs are looking better, monitor.  We will follow.







Job ID:  [id number]

## 2020-12-06 LAB
ANION GAP SERPL CALC-SCNC: 13 MMOL/L (ref 10–20)
BUN SERPL-MCNC: 31 MG/DL (ref 9.8–20.1)
CALCIUM SERPL-MCNC: 9 MG/DL (ref 7.8–10.44)
CHLORIDE SERPL-SCNC: 102 MMOL/L (ref 98–107)
CO2 SERPL-SCNC: 31 MMOL/L (ref 22–29)
CREAT CL PREDICTED SERPL C-G-VRATE: 93 ML/MIN (ref 70–130)
GLUCOSE SERPL-MCNC: 93 MG/DL (ref 70–105)
POTASSIUM SERPL-SCNC: 3.6 MMOL/L (ref 3.5–5.1)
SODIUM SERPL-SCNC: 142 MMOL/L (ref 136–145)

## 2020-12-06 RX ADMIN — NIFEDIPINE SCH MG: 60 TABLET, EXTENDED RELEASE ORAL at 20:46

## 2020-12-06 RX ADMIN — THERA TABS SCH TAB: TAB at 08:41

## 2020-12-06 RX ADMIN — PANTOPRAZOLE SODIUM SCH MG: 40 GRANULE, DELAYED RELEASE ORAL at 08:41

## 2020-12-06 RX ADMIN — CYANOCOBALAMIN TAB 1000 MCG SCH MCG: 1000 TAB at 08:40

## 2020-12-06 RX ADMIN — INSULIN GLARGINE SCH MLS: 100 INJECTION, SOLUTION SUBCUTANEOUS at 08:40

## 2020-12-06 RX ADMIN — INSULIN GLARGINE SCH: 100 INJECTION, SOLUTION SUBCUTANEOUS at 20:46

## 2020-12-06 RX ADMIN — ASPIRIN 81 MG CHEWABLE TABLET SCH MG: 81 TABLET CHEWABLE at 08:39

## 2020-12-06 RX ADMIN — NIFEDIPINE SCH MG: 60 TABLET, EXTENDED RELEASE ORAL at 08:41

## 2020-12-06 NOTE — PDOC.HOSPP
- Subjective


Encounter Date: 12/06/20


Encounter Time: 11:00


Subjective: 


Patient seen and examined for respiratory failure requiring mechanical 

ventilation.  Blood pressure better controlled.  Denies any nausea, vomiting or 

chest pain.





- Objective


Vital Signs & Weight: 


                             Vital Signs (12 hours)











  Temp Pulse Resp BP BP Pulse Ox


 


 12/06/20 17:10   89   174/91 H  


 


 12/06/20 16:12  98.2 F  89  18   174/91 H  95


 


 12/06/20 12:55   82   164/72 H  


 


 12/06/20 10:51  98.7 F  82  18   171/83 H  97


 


 12/06/20 08:41   87   172/75 H  


 


 12/06/20 08:40   87   172/75 H  


 


 12/06/20 08:00       100


 


 12/06/20 07:54  99.0 F  87  20   172/75 H  100








                                     Weight











Admit Weight                   361 lb


 


Weight                         330 lb 11.094 oz











                            Most Recent Monitor Data











Heart Rate from ECG            98


 


NIBP                           145/75


 


NIBP BP-Mean                   98


 


Respiration from ECG           20


 


SpO2                           99














I&O: 


                                        











 12/05/20 12/06/20 12/07/20





 06:59 06:59 06:59


 


Output Total 300  


 


Balance -300  











Result Diagrams: 


                                 12/02/20 03:30





                                 12/06/20 06:15


Additional Labs: 


                                   Accuchecks











  12/06/20 12/06/20 12/05/20





  10:41 04:30 20:26


 


POC Glucose  83  88  147 H














Hospitalist ROS





- Review of Systems


Respiratory: denies: cough, dry, shortness of breath, hemoptysis, SOB with 

excertion, pleuritic pain, sputum, wheezing, other


Cardiovascular: denies: chest pain, palpitations, orthopnea, paroxysmal noc. 

dyspnea, edema, light headedness, other





- Medication


Medications: 


Active Medications











Generic Name Dose Route Start Last Admin





  Trade Name Freq  PRN Reason Stop Dose Admin


 


Acetaminophen  650 mg  11/21/20 17:39  11/27/20 14:13





  Acetaminophen 650 Mg/20.3 Ml Udcup  PO   650 mg





  Q6H PRN   Administration





  Fever > 101 or Mild Pain  


 


Acetaminophen  650 mg  11/21/20 20:17  12/05/20 20:53





  Acetaminophen 325 Mg Tab  PO   650 mg





  Q4H PRN   Administration





  Headache/Fever/Mild Pain (1-3)  


 


Albuterol/Ipratropium  3 ml  11/21/20 19:00  12/06/20 13:30





  Ipratropium/Albuterol Sulfate 3 Ml Neb  NEB   3 ml





  [id number] SANDEE   Administration


 


Aspirin  81 mg  11/22/20 09:00  12/06/20 08:39





  Aspirin Chewable 81 Mg Tab  PO   81 mg





  DAILY SANDEE   Administration


 


Bisacodyl  10 mg  11/21/20 17:39  11/23/20 05:33





  Bisacodyl 10 Mg Supp  AZ   10 mg





  DAILYPRN PRN   Administration





  Constipation  


 


Carvedilol  25 mg  11/27/20 17:00  12/06/20 17:10





  Carvedilol 25 Mg Tab  PO   25 mg





  BID-WM SANDEE   Administration


 


Cyanocobalamin  1,000 mcg  11/23/20 09:00  12/06/20 08:40





  Cyanocobalamin (Vitamin B-12) 1,000 Mcg Tab  PER TUBE   1,000 mcg





  DAILY SANDEE   Administration


 


Enoxaparin Sodium  40 mg  12/03/20 21:00  12/05/20 20:49





  Enoxaparin Sodium 40 Mg/0.4 Ml Syringe  SC   40 mg





  2100 SANDEE   Administration


 


Folic Acid  1 mg  11/23/20 09:00  12/06/20 08:40





  Folic Acid 1 Mg Tab  PER TUBE   1 mg





  DAILY SANDEE   Administration


 


Hydralazine HCl  20 mg  11/27/20 14:13  12/03/20 09:08





  Hydralazine 20 Mg/Ml Vial  SLOW IVP   20 mg





  Q4H PRN   Administration





  SBP Greater Than 180  


 


Hydralazine HCl  25 mg  12/03/20 09:00  12/06/20 17:10





  Hydralazine 25 Mg Tab  PO   25 mg





  QID SANDEE   Administration


 


Insulin Glargine 20 units/  0.2 mls @ 0.1 mls/hr  11/26/20 21:00  12/06/20 08:40





  Miscellaneous Medication  SC   0.2 mls





  Q12HR SANDEE   Administration


 


Insulin Human Regular  0 units  11/21/20 17:45  11/22/20 20:36





  Insulin Regular 300 Units/3 Ml Vial  SC   3 units





  .BEDTIME SLIDING SC PRN   Administration





  Bedtime Correctional Scale  


 


Insulin Human Regular  0 units  11/21/20 19:07  11/27/20 18:21





  Insulin Regular 300 Units/3 Ml Vial  SC   3 unit





  .AGGRESSIVE SLIDING  PRN   Administration





  Aggressive Correctional Scale  


 


Isosorbide Mononitrate  60 mg  12/03/20 09:00  12/06/20 08:40





  Isosorbide Mononitrate Er 60 Mg Tab  PO   60 mg





  DAILY SANDEE   Administration


 


Labetalol HCl  10 mg  11/21/20 20:15  12/03/20 03:03





  Labetalol Hcl 100 Mg/20 Ml Vial  SLOW IVP   10 mg





  Q4H PRN   Administration





  Systolic BP > 180  


 


Lisinopril  20 mg  12/04/20 09:00  12/06/20 08:41





  Lisinopril 20 Mg Tab  PO   20 mg





  DAILY SANDEE   Administration


 


Multivitamins  1 tab  11/23/20 09:00  12/06/20 08:41





  Multivit, Therapeutic 1 Tab  PER TUBE   1 tab





  DAILY SANDEE   Administration


 


Pantoprazole Sodium  40 mg  11/26/20 09:00  12/06/20 08:41





  Pantoprazole 40 Mg Granules Packet  PER TUBE   40 mg





  DAILY SANDEE   Administration


 


Sodium Chloride  10 ml  11/21/20 17:39  12/03/20 20:25





  Flush - Normal Saline 10 Ml Syringe  IVF   10 ml





  PRN PRN   Administration





  Saline Flush  


 


Venlafaxine HCl  75 mg  11/28/20 09:00  12/06/20 08:41





  Venlafaxine Hcl 75 Mg Tab  PO   75 mg





  DAILY SANDEE   Administration














- Exam


General Appearance: NAD


Neck: supple, no JVD


Heart: RRR, no gallops


Respiratory: no wheezes, no ronchi


Gastrointestinal: soft, non-distended


Extremities: 2+ LE edema


Skin: normal turgor


Neurological: no new deficit





Hosp A/P





- Plan


DVT proph w/SCDs





Acute hypoxic and hypercapnic respiratory failure status post mechanical 

ventilation


Acute on chronic diastolic heart failure exacerbation


Acute asthma exacerbation


Hypertension with hypertensive urgency on admission


GABBY on CKD stage III


Diabetes mellitus type 2


Hypomagnesemia/hypokalemia


Obstructive sleep apnea


Morbid obesity with a BMI 47.4


Chronic anemia with hemoglobin 8.1 due to iron deficiency


Hyperlipidemia


Anxiety


Penicillin allergy


Physical deconditioning





Plan:


Await placement.  Continue current hypertensive medications.  Creatinine 

slightly elevated.  Recheck labs in a.m.  Procardia XL dose increased.  Continue

carvedilol, hydralazine and isosorbide mononitrate.  Continue current dose of 

lisinopril.  Continue Lantus with sliding scale.  Change sliding scale to 

moderate.  Continue other medications as above.  Sleep study as outpatient.

## 2020-12-06 NOTE — PRG
DATE OF SERVICE:  12/06/2020



SUBJECTIVE:  Patient was seen and examined at bedside and overnight events noted.

Patient denies any shortness of breath or chest pain or palpitation.  No history of

nausea or vomiting or diarrhea or fever or chills or cramps. 



OBJECTIVE:  GENERAL:  This is an obese female, in no acute distress. 

VITAL SIGNS:  Temperature 98.7.  Pulse 82.  Respiratory rate 18, blood pressure

171/83. 

HEENT:  Atraumatic, normocephalic. Oral mucosa is moist. 

NECK:  Supple. 

CARDIOVASCULAR:  S1, S2 heard.  Rate and rhythm regular. 

RESPIRATORY:  Clear to auscultation. 

GASTROINTESTINAL:  Abdomen is soft. 

MUSCULOSKELETAL:  No tenderness.  No edema. 

DERMATOLOGIC:  No skin rash. 

NEUROLOGIC:  Alert and awake and oriented x3.  No focal neurologic deficits. Moving

all the extremities. 

PSYCHIATRIC:  Mood and affect normal.



LABORATORY DATA:  Potassium 3.6, BUN is 31, and creatinine is 1.63.



ASSESSMENT AND PLAN:  

1. Acute kidney injury on chronic kidney stage 3, stable.

2. __________ ..

3. Alkalosis.

4. Edema.

5. Cardiorenal syndrome.

6. Acute hypoxic respiratory failure.

7. Renal function is stable.  Creatinine with slight bump today.  Encourage p.o.

intake and we will monitor labs.  Avoid nephrotoxins. 





Job ID:  [id number]

## 2020-12-07 LAB
ANION GAP SERPL CALC-SCNC: 15 MMOL/L (ref 10–20)
BUN SERPL-MCNC: 27 MG/DL (ref 9.8–20.1)
CALCIUM SERPL-MCNC: 9 MG/DL (ref 7.8–10.44)
CHLORIDE SERPL-SCNC: 103 MMOL/L (ref 98–107)
CO2 SERPL-SCNC: 27 MMOL/L (ref 22–29)
CREAT CL PREDICTED SERPL C-G-VRATE: 120 ML/MIN (ref 70–130)
GLUCOSE SERPL-MCNC: 92 MG/DL (ref 70–105)
POTASSIUM SERPL-SCNC: 3.9 MMOL/L (ref 3.5–5.1)
SODIUM SERPL-SCNC: 141 MMOL/L (ref 136–145)

## 2020-12-07 RX ADMIN — CYANOCOBALAMIN TAB 1000 MCG SCH MCG: 1000 TAB at 08:33

## 2020-12-07 RX ADMIN — INSULIN GLARGINE SCH: 100 INJECTION, SOLUTION SUBCUTANEOUS at 08:59

## 2020-12-07 RX ADMIN — ASPIRIN 81 MG CHEWABLE TABLET SCH MG: 81 TABLET CHEWABLE at 08:33

## 2020-12-07 RX ADMIN — NIFEDIPINE SCH MG: 60 TABLET, EXTENDED RELEASE ORAL at 08:33

## 2020-12-07 RX ADMIN — PANTOPRAZOLE SODIUM SCH MG: 40 GRANULE, DELAYED RELEASE ORAL at 08:32

## 2020-12-07 RX ADMIN — NIFEDIPINE SCH MG: 60 TABLET, EXTENDED RELEASE ORAL at 20:22

## 2020-12-07 RX ADMIN — INSULIN GLARGINE SCH: 100 INJECTION, SOLUTION SUBCUTANEOUS at 08:32

## 2020-12-07 RX ADMIN — THERA TABS SCH TAB: TAB at 08:32

## 2020-12-07 NOTE — PRG
DATE OF SERVICE:  12/07/2020



OBJECTIVE:  GENERAL:  Morbidly obese female, in no apparent distress. 

VITAL SIGNS:  Temperature 98.3, pulse 85, respiratory rate 14, blood pressure 174/91.



LABORATORY DATA:  Potassium 3.9, BUN is 27, creatinine is 1.3.



ASSESSMENT AND PLAN:  

1. Acute kidney injury on chronic kidney stage 3, stable.

2. Alkalosis, stable.

3. Edema.

4. Cardiorenal syndrome.

5. Acute hypoxic respiratory failure. 

Labs are stable.  We will follow. Avoid nephrotoxins.







Job ID:  [id number]

## 2020-12-07 NOTE — PDOC.HOSPP
- Subjective


Encounter Date: 12/07/20


Encounter Time: 10:30


Subjective: 


Patient seen and examined for respiratory failure due to heart failure 

exacerbation.  Denies any chest pain, shortness of breath or palpitations.  No 

nausea, vomiting reported.





- Objective


Vital Signs & Weight: 


                             Vital Signs (12 hours)











  Temp Pulse Resp BP BP Pulse Ox


 


 12/07/20 17:45   88    146/75 H 


 


 12/07/20 16:15   88   212/92 H  


 


 12/07/20 16:09  97.6 F  88  20   212/92 H  92 L


 


 12/07/20 13:57   90  16   


 


 12/07/20 13:06   78   156/80 H  


 


 12/07/20 11:02  98.0 F  78  18   156/80 H  96


 


 12/07/20 08:33   85   174/91 H  


 


 12/07/20 08:06   85  14    94 L








                                     Weight











Admit Weight                   361 lb


 


Weight                         358 lb 3 oz











                            Most Recent Monitor Data











Heart Rate from ECG            98


 


NIBP                           145/75


 


NIBP BP-Mean                   98


 


Respiration from ECG           20


 


SpO2                           99














Result Diagrams: 


                                 12/02/20 03:30





                                 12/07/20 05:46


Additional Labs: 


                                   Accuchecks











  12/07/20 12/07/20 12/06/20





  11:04 05:20 20:08


 


POC Glucose  99  84  106 H














  12/06/20 12/04/20 12/04/20





  16:00 15:44 11:27


 


POC Glucose  68 L  179 H  141 H














Hospitalist ROS





- Review of Systems


Cardiovascular: denies: chest pain, palpitations, orthopnea, paroxysmal noc. 

dyspnea, edema, light headedness, other


Gastrointestinal: denies: nausea, vomiting, abdominal pain, diarrhea, 

constipation, melena, hematochezia, other





- Medication


Medications: 


Active Medications











Generic Name Dose Route Start Last Admin





  Trade Name Freq  PRN Reason Stop Dose Admin


 


Acetaminophen  650 mg  11/21/20 17:39  11/27/20 14:13





  Acetaminophen 650 Mg/20.3 Ml Udcup  PO   650 mg





  Q6H PRN   Administration





  Fever > 101 or Mild Pain  


 


Acetaminophen  650 mg  11/21/20 20:17  12/07/20 16:19





  Acetaminophen 325 Mg Tab  PO   650 mg





  Q4H PRN   Administration





  Headache/Fever/Mild Pain (1-3)  


 


Albuterol/Ipratropium  3 ml  11/21/20 19:00  12/07/20 13:57





  Ipratropium/Albuterol Sulfate 3 Ml Neb  NEB   3 ml





  [id number] SANDEE   Administration


 


Aspirin  81 mg  11/22/20 09:00  12/07/20 08:33





  Aspirin Chewable 81 Mg Tab  PO   81 mg





  DAILY SANDEE   Administration


 


Bisacodyl  10 mg  11/21/20 17:39  11/23/20 05:33





  Bisacodyl 10 Mg Supp  FL   10 mg





  DAILYPRN PRN   Administration





  Constipation  


 


Carvedilol  25 mg  11/27/20 17:00  12/07/20 16:16





  Carvedilol 25 Mg Tab  PO   25 mg





  BID-WM SANDEE   Administration


 


Cyanocobalamin  1,000 mcg  11/23/20 09:00  12/07/20 08:33





  Cyanocobalamin (Vitamin B-12) 1,000 Mcg Tab  PER TUBE   1,000 mcg





  DAILY SANDEE   Administration


 


Enoxaparin Sodium  40 mg  12/03/20 21:00  12/06/20 20:46





  Enoxaparin Sodium 40 Mg/0.4 Ml Syringe  SC   40 mg





  2100 SANDEE   Administration


 


Folic Acid  1 mg  11/23/20 09:00  12/07/20 08:33





  Folic Acid 1 Mg Tab  PER TUBE   1 mg





  DAILY SANDEE   Administration


 


Hydralazine HCl  20 mg  11/27/20 14:13  12/03/20 09:08





  Hydralazine 20 Mg/Ml Vial  SLOW IVP   20 mg





  Q4H PRN   Administration





  SBP Greater Than 180  


 


Hydralazine HCl  25 mg  12/03/20 09:00  12/07/20 16:15





  Hydralazine 25 Mg Tab  PO   25 mg





  QID SANDEE   Administration


 


Insulin Glargine 15 units/  0.15 mls @ 0 mls/hr  12/07/20 09:00  12/07/20 08:59





  Miscellaneous Medication  SC   Not Given





  QAM [facility]  


 


Insulin Human Regular  0 units  11/21/20 17:45  11/22/20 20:36





  Insulin Regular 300 Units/3 Ml Vial  SC   3 units





  .BEDTIME SLIDING SC PRN   Administration





  Bedtime Correctional Scale  


 


Isosorbide Mononitrate  60 mg  12/03/20 09:00  12/07/20 08:34





  Isosorbide Mononitrate Er 60 Mg Tab  PO   60 mg





  DAILY SANDEE   Administration


 


Labetalol HCl  10 mg  11/21/20 20:15  12/03/20 03:03





  Labetalol Hcl 100 Mg/20 Ml Vial  SLOW IVP   10 mg





  Q4H PRN   Administration





  Systolic BP > 180  


 


Lisinopril  20 mg  12/04/20 09:00  12/07/20 08:33





  Lisinopril 20 Mg Tab  PO   20 mg





  DAILY SANDEE   Administration


 


Multivitamins  1 tab  11/23/20 09:00  12/07/20 08:32





  Multivit, Therapeutic 1 Tab  PER TUBE   1 tab





  DAILY SANDEE   Administration


 


Nifedipine  60 mg  12/06/20 21:00  12/07/20 08:33





  Nifedipine Xl 60 Mg Tab  PO   60 mg





  BID SANDEE   Administration


 


Pantoprazole Sodium  40 mg  11/26/20 09:00  12/07/20 08:32





  Pantoprazole 40 Mg Granules Packet  PER TUBE   40 mg





  DAILY SANDEE   Administration


 


Sodium Chloride  10 ml  11/21/20 17:39  12/03/20 20:25





  Flush - Normal Saline 10 Ml Syringe  IVF   10 ml





  PRN PRN   Administration





  Saline Flush  


 


Venlafaxine HCl  75 mg  11/28/20 09:00  12/07/20 08:32





  Venlafaxine Hcl 75 Mg Tab  PO   75 mg





  DAILY SANDEE   Administration














- Exam


General Appearance: NAD


Heart: RRR, no gallops


Respiratory: no wheezes, no ronchi


Gastrointestinal: soft, non-tender, normal bowel sounds


Extremities: no cyanosis


Neurological: no new deficit





Hosp A/P





- Plan


DVT proph w/SCDs





Acute hypoxic and hypercapnic respiratory failure status post mechanical 

ventilation


Acute on chronic diastolic heart failure exacerbation


Acute asthma exacerbation


Hypertension with hypertensive urgency on admission


GABBY on CKD stage III


Diabetes mellitus type 2


Hypomagnesemia/hypokalemia


Obstructive sleep apnea


Morbid obesity with a BMI 47.4


Chronic anemia with hemoglobin 8.1 due to iron deficiency


Hyperlipidemia


Anxiety


Penicillin allergy


Physical deconditioning





Plan:


Reduce Lantus dose.  Await placement.  Continue aspirin, carvedilol, 

hydralazine, lisinopril, isosorbide mononitrate and Procardia XL.  Check CBC in 

a.m.  Patient is stable for discharge.  Continue other medications as above.  

Continue with therapy

## 2020-12-08 VITALS — TEMPERATURE: 97.9 F | DIASTOLIC BLOOD PRESSURE: 62 MMHG | SYSTOLIC BLOOD PRESSURE: 176 MMHG

## 2020-12-08 LAB
ANION GAP SERPL CALC-SCNC: 14 MMOL/L (ref 10–20)
BASOPHILS # BLD AUTO: 0.1 THOU/UL (ref 0–0.2)
BASOPHILS NFR BLD AUTO: 1.3 % (ref 0–1)
BUN SERPL-MCNC: 23 MG/DL (ref 9.8–20.1)
CALCIUM SERPL-MCNC: 9.3 MG/DL (ref 7.8–10.44)
CHLORIDE SERPL-SCNC: 102 MMOL/L (ref 98–107)
CO2 SERPL-SCNC: 30 MMOL/L (ref 22–29)
CREAT CL PREDICTED SERPL C-G-VRATE: 120 ML/MIN (ref 70–130)
EOSINOPHIL # BLD AUTO: 0.2 THOU/UL (ref 0–0.7)
EOSINOPHIL NFR BLD AUTO: 2.5 % (ref 0–10)
GLUCOSE SERPL-MCNC: 111 MG/DL (ref 70–105)
HGB BLD-MCNC: 7.7 G/DL (ref 12–16)
LYMPHOCYTES # BLD: 1 THOU/UL (ref 1.2–3.4)
LYMPHOCYTES NFR BLD AUTO: 13 % (ref 21–51)
MCH RBC QN AUTO: 24.2 PG (ref 27–31)
MCV RBC AUTO: 80 FL (ref 78–98)
MONOCYTES # BLD AUTO: 0.5 THOU/UL (ref 0.11–0.59)
MONOCYTES NFR BLD AUTO: 6.9 % (ref 0–10)
NEUTROPHILS # BLD AUTO: 5.9 THOU/UL (ref 1.4–6.5)
NEUTROPHILS NFR BLD AUTO: 76.2 % (ref 42–75)
PLATELET # BLD AUTO: 275 THOU/UL (ref 130–400)
POTASSIUM SERPL-SCNC: 3.7 MMOL/L (ref 3.5–5.1)
RBC # BLD AUTO: 3.17 MILL/UL (ref 4.2–5.4)
SODIUM SERPL-SCNC: 142 MMOL/L (ref 136–145)
WBC # BLD AUTO: 7.7 THOU/UL (ref 4.8–10.8)

## 2020-12-08 RX ADMIN — INSULIN GLARGINE SCH: 100 INJECTION, SOLUTION SUBCUTANEOUS at 09:04

## 2020-12-08 RX ADMIN — THERA TABS SCH TAB: TAB at 08:42

## 2020-12-08 RX ADMIN — ASPIRIN 81 MG CHEWABLE TABLET SCH MG: 81 TABLET CHEWABLE at 08:42

## 2020-12-08 RX ADMIN — NIFEDIPINE SCH MG: 60 TABLET, EXTENDED RELEASE ORAL at 08:43

## 2020-12-08 RX ADMIN — CYANOCOBALAMIN TAB 1000 MCG SCH MCG: 1000 TAB at 08:42

## 2020-12-08 RX ADMIN — PANTOPRAZOLE SODIUM SCH MG: 40 GRANULE, DELAYED RELEASE ORAL at 08:42

## 2020-12-08 NOTE — PDOC.DS.DS
Provider





- Provider


Date of Admission: 


11/21/20 17:21





Date of Discharge: 12/08/20


Admitting Provider: 


Damion Dave MD





Consultations: Cardiology, Nephrology, Neurology, Pulmonary


Primary Care Physician: 


WESLEY Ca








Course





- Hospital Course


Hospital Course: 


Patient is a 55-year-old female with chronic diastolic heart failure, 

obstructive sleep apnea not on CPAP, morbid obesity, asthma and hypertension 

presented to the emergency room with worsening shortness of breath on 11/21.  

Her blood pressure in the emergency room was 240/130.  Her O2 saturation was 89%

on room air.  Please refer to the history and physical for further details


The patient was admitted to the hospital with a diagnosis of acute hypoxic and 

hypercapnic respiratory failure requiring mechanical ventilation.  She was 

monitored in the intensive care unit.  She was seen by intensivist and 

cardiologist.  She was placed on IV diuretics along with multiple antihyperte

nsives.  The CODE STATUS was changed to DO NOT RESUSCITATE after discussing with

the family.  Echocardiogram showed ejection fraction 50 to 55%.  Patient was 

subsequently extubated and transferred to the medical floor.  She also was seen 

by nephrology for acute kidney injury that has stabilized.  She was also 

evaluated by neurology due to worsening encephalopathy.  EEG showed diffuse 

slowing without any seizure activity.  Patient appears stable for discharge to 

inpatient rehabilitation.  She needs a sleep study as outpatient as soon as 

possible.





Final diagnosis:


Acute hypoxic and hypercapnic respiratory failure status post mechanical 

ventilation


Acute on chronic diastolic heart failure exacerbation


Acute asthma exacerbation


Hypertension with hypertensive urgency on admission


GABBY on CKD stage III


Diabetes mellitus type 2


Hypomagnesemia/hypokalemia


Obstructive sleep apnea


Morbid obesity with a BMI 47.4


Chronic anemia with hemoglobin 8.1 due to iron deficiency


Hyperlipidemia


Anxiety


Penicillin allergy


Physical deconditioning





Time coordinating the discharge of this patient was 38 minutes.


Resuscitation Status: 








11/30/20 16:05


Resuscitation Status Routine 


   Resuscitation Status: DNAR: NO Resuscitation


   Discussed with: pt. daughter and son











- Labs


Lab Results: 


                                        





                                 12/08/20 05:53 





                                 12/08/20 05:53 





Abnormal Lab Results - Last 48 hrs





12/07/20 05:46: BUN 27 H, Creatinine 1.38 H


12/08/20 05:53: Carbon Dioxide 30 H, BUN 23 H, Creatinine 1.37 H


12/08/20 05:53: RBC 3.17 L, Hgb 7.7 L, Hct 25.4 L, MCH 24.2 L, MCHC 30.3 L, RDW 

19.1 H, MPV 11.4 H, Neutrophils % 76.2 H, Lymphocytes % 13.0 L, Basophils % 1.3 

H, Lymphocytes # 1.0 L





Microbiology - Entire Visit





11/26/20 19:30   Stool   Stool Occult Blood (TAWANDA) - Final


11/21/20 14:54   Urine malagon catheter   Urine Culture - Final


                            NO GROWTH AT 48 HOURS











- Physical Exam


Vitals: 


                             Vital Signs (12 hours)











  Temp Pulse Resp BP BP BP Pulse Ox


 


 12/08/20 12:00  97.9 F  85  20   176/62 H   93 L


 


 12/08/20 11:35     160/73 H   


 


 12/08/20 08:06   83  18     94 L


 


 12/08/20 08:00  97.8 F  90  18    167/86 H  93 L








                                     Weight











Admit Weight                   361 lb


 


Weight                         358 lb 3 oz











                            Most Recent Monitor Data











Heart Rate from ECG            98


 


NIBP                           145/75


 


NIBP BP-Mean                   98


 


Respiration from ECG           20


 


SpO2                           99














Physical Exam: 


The patient was seen and examined on the day of discharge.








Plan





- Discharge Medications


Home Medications: 


                                        











 Medication  Instructions  Recorded  Confirmed  Type


 


Atorvastatin Calcium [Lipitor] 40 mg PO DAILY 11/29/19 11/26/20 History


 


Ergocalciferol (Vitamin D2) 5,000 unit PO Q7DAYS 11/29/19 11/26/20 History





[Vitamin D2]    


 


Pantoprazole Sodium 40 mg PO DAILY 11/29/19 11/26/20 History


 


Albuterol Sulfate [Proair 90 mcg IH DAILY PRN 12/17/19 11/26/20 History





Respiclick]    


 


Venlafaxine HCl [Effexor] 75 mg PO DAILY 12/17/19 11/26/20 History


 


Ferrous Sulfate [Feosol] 325 mg PO QAM-WM #30 tab 12/19/19 11/26/20 Rx


 


Ipratropium/Albuterol Sulfate 3 ml NEB QID PRN #30 neb 12/19/19 11/26/20 Rx





[DuoNeb]    


 


Lisinopril 20 mg PO DAILY 08/10/20 11/26/20 History


 


Acetaminophen [Tylenol Elixir] 650 mg PO Q6H PRN  udcup 12/08/20  Rx


 


Aspirin Chewable [Aspirin Chewable 81 mg PO DAILY  tab 12/08/20  Rx





Tablet]    


 


Carvedilol [Coreg] 25 mg PO BID-WM  tab 12/08/20  Rx


 


Cyanocobalamin (Vitamin B-12) 1,000 mcg PER TUBE DAILY  tab 12/08/20  Rx





[Vitamin B-12]    


 


Folic Acid [Folvite] 1 mg PO DAILY  tab 12/08/20  Rx


 


Furosemide 40 mg PO BID PRN #0 12/08/20 11/26/20 Rx


 


Isosorbide Mononitrate [Imdur] 60 mg PO DAILY  tab 12/08/20  Rx


 


Multivit, Therapeutic [Theragran] 1 tab PER TUBE DAILY  tab 12/08/20  Rx


 


NIFEdipine [Procardia XL] 60 mg PO BID  tab 12/08/20  Rx


 


hydrALAZINE [Apresoline] 25 mg PO QID  tab 12/08/20  Rx











Allergies: 








Penicillins Allergy (Verified 11/26/20 03:23)


   Hives








- Discharge Instructions


Discharge Instructions:: 


Sleep study ASAP


BMP after 1 week


Fall/Aspiration/decubitus ulcer precautions





- Follow up Plan


Referrals: 


Ruiz Benavides MD [Primary Care Provider] - 7 Days


Obinna Barajas MD [Active] - 14 Days


Steffi Andrea MD [Active] - 14 Days


Disposition: REHABILITATION INPATIENT





Quality





- Care Measures


CORE MEASURES:: HF

## 2020-12-08 NOTE — PRG
DATE OF SERVICE:  12/08/2020



SUBJECTIVE:  This morning, she is awake, alert, and responsive, DNR, in no distress,

sitting on the side of the bed, creatinine 1.37. 



OBJECTIVE:  VITAL SIGNS:  Temperature 97, pulse 83, sats __________ on 1 L, and

blood pressure 167/86. 

CHEST:  No wheezing.  No crackles. 

CARDIAC:  Normal S1, S2.  No gallops. 

ABDOMEN:  No masses.



IMPRESSION:  Status post respiratory failure, congestive heart failure, chronic

obstructive pulmonary disease, renal failure, morbid obesity, and encephalopathy. 



PLAN:  Pulmonary wise, stable. 



Home any time.  Pulmonary will follow at a distance.







Job ID:  [id number]

## 2020-12-08 NOTE — PRG
DATE OF SERVICE:  12/08/2020



SUBJECTIVE:  Patient was seen and examined at bedside and overnight events noted.

Patient denies any shortness of breath or chest pain or palpitation.  No history of

nausea or vomiting or diarrhea or fever or chills or cramps. 



OBJECTIVE:  General:  This is an obese female, in no apparent distress. 

Vital Signs:  Temperature 97.9.  Heart Rate 84.  Respiratory rate __________.  Blood

pressure 176/62. 

HEENT:  Atraumatic, normocephalic. Oral mucosa is moist. 

Neck:  Supple. 

Cardiovascular:  S1, S2 heard.  Rate and rhythm regular. 

Respiratory:  Clear to auscultation. 

Gastrointestinal:  Abdomen is soft. 

Musculoskeletal:  No tenderness.  No edema. 

Dermatologic:  No skin rash. 

Neurologic:  Alert and awake and oriented x3.  No focal neurologic deficits. Moving

all the extremities. 

Psychiatric:  Mood and affect normal.



LABORATORY DATA:  Potassium 3.7, BUN is __________, creatinine is 1.37.



ASSESSMENT AND PLAN:  

1. Acute kidney injury on chronic kidney stage 3, stable.

2. Alkalosis.

3. Edema.

4. History of hypertension.

5. Cardiorenal syndrome.

6. Morbid obesity. 

Labs are stable.  I will sign off.  Advised to follow up with the clinic in 1 to 2

weeks. 







Job ID:  [id number]

## 2020-12-10 NOTE — PQF
Q17                                                                 2019 
[facility]                            Updated: 9.2019          
                   



CLINICAL DOCUMENTATION CLARIFICATION FORM:















Please check appropriate box(es):

[  ] Cerebral Infarction:    [  ] Cerebral   [  ] Pre-Cerebral      

   [  ] Thrombosis

   [  ] Embolism

   [  ] Unspecified occlusion or stenosis

   [  ] Venous Thrombosis

   [  ] Other Cerebral Infarction

    Laterality:   [  ] Right   [  ] Left   [  ] Bilateral

       [  ] Pre-Cerebral Artery:   [  ] Vertebral   [  ] Carotid   [  ] Other 
Pre-Cerebral Artery

       [  ] Cerebral Artery:   [  ] Middle      [  ] Anterior   [  ] Posterior
      

            [  ] Cerebellar   [  ] Other Cerebral Artery   [  ] Unspecified 
Cerebral Artery

[  ] CVA Ruled in/ Ruled out

[  ] Cerebral artery occlusion without infarction

[  ] TIA   

[  ] Other diagnosis 

[  ] Unable to determine

In addition, please specify:

Present on Admission (POA):  [  ] Yes             [  ] No             [ x ] 
Unable to determine









To be completed by CDI/Coding staff for physician review: 







 Present Clinical Indicators - Signs / Symptoms / Labs Results and Location in 

Medical Record

 

[  ] Speech or swallowing difficulty (aphasia, dysarthria, dysphagia)           
                               

 

[x  ] Changes in mental status, confusion AMS-ER report, 11/25 Radiology report

 

[x  ] Harmony Coma Scale E2,V1,M1  ER report

 

[  ] NIHHS Score 

 

[ x ] Changes in motor strength (plegia, paresis) 12/3 PN-recovery of left 
hemiparesis, 11/30 PN-neuromuscular weakness, 11/29 PN- History of unilateral 
weakness, 11/28 PN-CVA, 11/26-11/27 PN-CVA

 

[  ] Impaired vision (blurred, photophobia, dimming) 

 

[  ] Numbness of extremities or the face 

 

[  ] Loss of coordination 

 

[  ] CT/MRI/Doppler results: thrombosis, embolism, occlusion, stenosis 

 

[  ] Increased intracranial pressure 

 

[  ] Sudden intense headache 

 

[  ] Changes in white matter, old linear / lacunar infarcts  

 

[  ] Seizures 

 

[x  ] High blood pressure HTN-H&P, PN

 

Present Risk Factors                                                            
             Results and Location in Medical Record

 

[  ] Arrhythmias (A-Fib, Sick Sinus Syndrome, A-Flutter new onset) 

 

[  ] Carotid stenosis 

 

[  ] Valvular disease (rheumatic, calcified, endocarditis)  

 

[x  ] Heart disease (recent MI, ventricular aneurysm, uncontrolled HTN, CHF, PFO
) Cardiorenal syndrome, Diastolic CHF-H&P, PN, DS

HTN

 

[  ] Abuse/dependence: alcohol, tobacco, drugs 

 

Present Treatments                                                              
               Results and Location in Medical Record

 

[  ] Anti-thrombolytic (Plavix, Coumadin, TPA) 

 

[ x ] CAT Scan-CT Brain Small lacunar infarction in region of right corpus 
striatum of indeterminate age, possible acute or subacute. Incidental finding of
old lacunar infarction in right external capsule.

 

[  ] Neuro checks  

 

[x  ] Neuro consult EEG-showed diffuse slowing, but no epileptiform features  
11/24 Consult

 

[ x ] Antihypertensives Lisinopril  H&P

 

[  ] PT/INR 





CDS/ Signature: Amie Cornell__________________________      Phone 
#: [phone]____________        Date/Time: 12/10/2020_____________



                 This is a permanent part of the Medical Record

[facility]

## 2020-12-15 ENCOUNTER — HOSPITAL ENCOUNTER (INPATIENT)
Dept: HOSPITAL 92 - ERS | Age: 55
LOS: 4 days | Discharge: HOME HEALTH | DRG: 291 | End: 2020-12-19
Attending: FAMILY MEDICINE | Admitting: INTERNAL MEDICINE
Payer: MEDICARE

## 2020-12-15 VITALS — BODY MASS INDEX: 51 KG/M2

## 2020-12-15 DIAGNOSIS — Z88.0: ICD-10-CM

## 2020-12-15 DIAGNOSIS — Z83.3: ICD-10-CM

## 2020-12-15 DIAGNOSIS — J96.21: ICD-10-CM

## 2020-12-15 DIAGNOSIS — Z99.81: ICD-10-CM

## 2020-12-15 DIAGNOSIS — Z92.21: ICD-10-CM

## 2020-12-15 DIAGNOSIS — Z80.3: ICD-10-CM

## 2020-12-15 DIAGNOSIS — Z90.49: ICD-10-CM

## 2020-12-15 DIAGNOSIS — J44.9: ICD-10-CM

## 2020-12-15 DIAGNOSIS — E83.42: ICD-10-CM

## 2020-12-15 DIAGNOSIS — Z79.899: ICD-10-CM

## 2020-12-15 DIAGNOSIS — E66.2: ICD-10-CM

## 2020-12-15 DIAGNOSIS — Z20.828: ICD-10-CM

## 2020-12-15 DIAGNOSIS — N17.9: ICD-10-CM

## 2020-12-15 DIAGNOSIS — I50.33: ICD-10-CM

## 2020-12-15 DIAGNOSIS — J96.22: ICD-10-CM

## 2020-12-15 DIAGNOSIS — I16.1: ICD-10-CM

## 2020-12-15 DIAGNOSIS — Z28.21: ICD-10-CM

## 2020-12-15 DIAGNOSIS — I13.0: Primary | ICD-10-CM

## 2020-12-15 DIAGNOSIS — J45.20: ICD-10-CM

## 2020-12-15 DIAGNOSIS — E11.22: ICD-10-CM

## 2020-12-15 DIAGNOSIS — Z82.49: ICD-10-CM

## 2020-12-15 DIAGNOSIS — Z51.5: ICD-10-CM

## 2020-12-15 DIAGNOSIS — Z98.51: ICD-10-CM

## 2020-12-15 DIAGNOSIS — E87.5: ICD-10-CM

## 2020-12-15 DIAGNOSIS — Z85.72: ICD-10-CM

## 2020-12-15 DIAGNOSIS — N18.31: ICD-10-CM

## 2020-12-15 DIAGNOSIS — E87.0: ICD-10-CM

## 2020-12-15 LAB
ALBUMIN SERPL BCG-MCNC: 4 G/DL (ref 3.5–5)
ALP SERPL-CCNC: 108 U/L (ref 40–110)
ALT SERPL W P-5'-P-CCNC: 16 U/L (ref 8–55)
ANALYZER IN CARDIO: (no result)
ANION GAP SERPL CALC-SCNC: 13 MMOL/L (ref 10–20)
APTT PPP: 32.8 SEC (ref 22.9–36.1)
AST SERPL-CCNC: 12 U/L (ref 5–34)
BASE EXCESS STD BLDA CALC-SCNC: -0.7 MEQ/L
BASOPHILS # BLD AUTO: 0.1 THOU/UL (ref 0–0.2)
BASOPHILS NFR BLD AUTO: 0.8 % (ref 0–1)
BILIRUB SERPL-MCNC: 0.5 MG/DL (ref 0.2–1.2)
BUN SERPL-MCNC: 24 MG/DL (ref 9.8–20.1)
CA-I BLDA-SCNC: 1.26 MMOL/L (ref 1.12–1.3)
CALCIUM SERPL-MCNC: 9.3 MG/DL (ref 7.8–10.44)
CHLORIDE SERPL-SCNC: 101 MMOL/L (ref 98–107)
CO2 SERPL-SCNC: 33 MMOL/L (ref 22–29)
CREAT CL PREDICTED SERPL C-G-VRATE: 0 ML/MIN (ref 70–130)
EOSINOPHIL # BLD AUTO: 0.2 THOU/UL (ref 0–0.7)
EOSINOPHIL NFR BLD AUTO: 2.1 % (ref 0–10)
GLOBULIN SER CALC-MCNC: 2.9 G/DL (ref 2.4–3.5)
GLUCOSE SERPL-MCNC: 172 MG/DL (ref 70–105)
HCO3 BLDA-SCNC: 28.4 MEQ/L (ref 22–28)
HCT VFR BLDA CALC: 26 % (ref 36–47)
HGB BLD-MCNC: 8.2 G/DL (ref 12–16)
HGB BLDA-MCNC: 9 G/DL (ref 12–16)
INR PPP: 1.2
LIPASE SERPL-CCNC: 8 U/L (ref 8–78)
LYMPHOCYTES # BLD: 0.9 THOU/UL (ref 1.2–3.4)
LYMPHOCYTES NFR BLD AUTO: 8.4 % (ref 21–51)
MCH RBC QN AUTO: 24.5 PG (ref 27–31)
MCV RBC AUTO: 82.7 FL (ref 78–98)
MONOCYTES # BLD AUTO: 0.5 THOU/UL (ref 0.11–0.59)
MONOCYTES NFR BLD AUTO: 4.6 % (ref 0–10)
NEUTROPHILS # BLD AUTO: 9 THOU/UL (ref 1.4–6.5)
NEUTROPHILS NFR BLD AUTO: 84.1 % (ref 42–75)
PCO2 BLDA: 77.2 MMHG (ref 35–45)
PH BLDA: 7.18 [PH] (ref 7.35–7.45)
PLATELET # BLD AUTO: 344 THOU/UL (ref 130–400)
PO2 BLDA: 263.9 MMHG (ref 80–100)
POTASSIUM BLD-SCNC: 4.62 MMOL/L (ref 3.7–5.3)
POTASSIUM SERPL-SCNC: 4.7 MMOL/L (ref 3.5–5.1)
PROTHROMBIN TIME: 15.3 SEC (ref 12–14.7)
RBC # BLD AUTO: 3.35 MILL/UL (ref 4.2–5.4)
SODIUM SERPL-SCNC: 142 MMOL/L (ref 136–145)
SPECIMEN DRAWN FROM PATIENT: (no result)
TROPONIN I SERPL DL<=0.01 NG/ML-MCNC: 0.02 NG/ML (ref ?–0.03)
TROPONIN I SERPL DL<=0.01 NG/ML-MCNC: 0.02 NG/ML (ref ?–0.03)
WBC # BLD AUTO: 10.7 THOU/UL (ref 4.8–10.8)

## 2020-12-15 PROCEDURE — 83605 ASSAY OF LACTIC ACID: CPT

## 2020-12-15 PROCEDURE — 96374 THER/PROPH/DIAG INJ IV PUSH: CPT

## 2020-12-15 PROCEDURE — 85730 THROMBOPLASTIN TIME PARTIAL: CPT

## 2020-12-15 PROCEDURE — 83690 ASSAY OF LIPASE: CPT

## 2020-12-15 PROCEDURE — 83735 ASSAY OF MAGNESIUM: CPT

## 2020-12-15 PROCEDURE — 85025 COMPLETE CBC W/AUTO DIFF WBC: CPT

## 2020-12-15 PROCEDURE — 94640 AIRWAY INHALATION TREATMENT: CPT

## 2020-12-15 PROCEDURE — 36416 COLLJ CAPILLARY BLOOD SPEC: CPT

## 2020-12-15 PROCEDURE — 80048 BASIC METABOLIC PNL TOTAL CA: CPT

## 2020-12-15 PROCEDURE — 36600 WITHDRAWAL OF ARTERIAL BLOOD: CPT

## 2020-12-15 PROCEDURE — 80053 COMPREHEN METABOLIC PANEL: CPT

## 2020-12-15 PROCEDURE — 84484 ASSAY OF TROPONIN QUANT: CPT

## 2020-12-15 PROCEDURE — 36415 COLL VENOUS BLD VENIPUNCTURE: CPT

## 2020-12-15 PROCEDURE — 0240U: CPT

## 2020-12-15 PROCEDURE — 71045 X-RAY EXAM CHEST 1 VIEW: CPT

## 2020-12-15 PROCEDURE — 5A09357 ASSISTANCE WITH RESPIRATORY VENTILATION, LESS THAN 24 CONSECUTIVE HOURS, CONTINUOUS POSITIVE AIRWAY PRESSURE: ICD-10-PCS | Performed by: INTERNAL MEDICINE

## 2020-12-15 PROCEDURE — 85610 PROTHROMBIN TIME: CPT

## 2020-12-15 PROCEDURE — 82805 BLOOD GASES W/O2 SATURATION: CPT

## 2020-12-15 PROCEDURE — 87040 BLOOD CULTURE FOR BACTERIA: CPT

## 2020-12-15 PROCEDURE — 93005 ELECTROCARDIOGRAM TRACING: CPT

## 2020-12-15 PROCEDURE — 94660 CPAP INITIATION&MGMT: CPT

## 2020-12-15 PROCEDURE — 83880 ASSAY OF NATRIURETIC PEPTIDE: CPT

## 2020-12-15 RX ADMIN — Medication SCH ML: at 10:21

## 2020-12-15 RX ADMIN — CEFTRIAXONE SCH MLS: 1 INJECTION, POWDER, FOR SOLUTION INTRAMUSCULAR; INTRAVENOUS at 08:29

## 2020-12-15 RX ADMIN — AZITHROMYCIN SCH MLS: 500 INJECTION, POWDER, LYOPHILIZED, FOR SOLUTION INTRAVENOUS at 10:51

## 2020-12-15 RX ADMIN — Medication SCH ML: at 21:14

## 2020-12-15 NOTE — RAD
PORTABLE CHEST:

 

HISTORY: 

Shortness of breath.

 

COMPARISON: 

12/9/2020.

 

FINDINGS: 

Bilateral effusions and bibasilar infiltrates.  Upper lung fields are clear.  Heart size upper normal
 and stable.  

 

IMPRESSION: 

Bilateral effusions and bibasilar infiltrates, more pronounced than on the 12/9/2020 exam.

 

POS: AGW

## 2020-12-15 NOTE — PDOC.HHP
Hospitalist HPI





- History of Present Illness


Respiratory failure


History of Present Illness: 


Patient is 55-year-old female with chronic diastolic heart failure, asthma, 

obstructive sleep apnea and hypertension brought to ED from rehab facility for 

shortness of breath. Patient was recently admitted to this hospital with a 

diagnosis of acute hypoxic and hypercapnic respiratory failure requiring 

mechanical ventilation.  She was weaned from vent and sent to rehab facility, 

today she decompensated there became short of breath, was transferred here on 

NRB, was found by ED physician short of breath in severe respiratory distress, 

wheezing diffusely. was in respiratory acidosis. imaging was concerning for v

olume overload with known history of diastolic CHF, given lasix and admitted to 

CCU for further management. 





Last admission, Echocardiogram showed ejection fraction 50 to 55%. She needs a 

sleep study as outpatient as soon as possible.




















PAST MEDICAL HISTORY: Bronchial asthma, chronic diastolic heart failure, 

obstructive sleep apneacurrently not on CPAP, ?Obesity hypoventilation, 

hypertension, diabetes mellitus type 2, morbid obesity, chronic anemia, history 

of non-Hodgkin's lymphoma completed chemotherapy, chronic hypoxic respiratory 

failure on home oxygen





PAST SURGICAL HISTORY: Cholecystectomy, tubal ligation, Port-A-Cath, lymph node 

removal





SOCIAL HISTORY: Currently lives at home close to her family.  Patient is 

disabled.  She ambulates with the help of a walker.  No tobacco, alcohol or drug

use





FAMILY HISTORY: Diabetes and hypertension runs in her family.  Breast cancer in 

her mother.  Father  in his 60s from massive MI.





Hospitalist ROS





- Review of Systems


ROS unobtainable: due to mental status


Other: 





unable to obtain, patient on BIPAP





- Medication


Medications: 


reviewed, see transfer documents in chart





Hospitalist History





- Past Medical History


Other Medical History: 





 


 Flu vaccine up to date, Pneumococcal vaccine up to date, Tetanus not up to 

date, Past medical history includes cardiac history, congestive heart failure, 

Past medical history includes hematological history, Anemia, hypertension, which

has been treated, Patient is compliant, malignancy, Non-hodgkins lymphoma, 

pulmonary disease, chronic obstructive pulmonary disease. diabetes, Type II, 

obesity.





- Past Surgical History


Other Surgical History: 





 


 LYMPHNODES REMOVED FROM RIGHT GROIN - NON HODGKINS LYMPHOMA DIAGNOSIS AT 

THIS TIME., Surgical history of cholecystectomy, Surgical history of tubal 

ligation, Patient has no surgical history.





- Family History


Family History: reports: no pertinent history





- Social History


Smoking Status: Never smoker


Alcohol: reports: None


Drugs: reports: none





- Exam


General Appearance: NAD, awake alert


General - other findings: on BIPAP, awake alert and communicating with head 

movements


Eye: PERRL, anicteric sclera


ENT: normocephalic atraumatic, no oropharyngeal lesions, moist mucosa


Neck: supple, symmetric, no JVD, no thyromegaly, no lymphadenopathy, no carotid 

bruit


Heart: RRR, no murmur, no gallops, no rubs, normal peripheral pulses


Respiratory: CTAB, no wheezes, no rales, no ronchi, normal chest expansion, no 

tachypnea, normal percussion


Gastrointestinal: soft, non-tender, non-distended, normal bowel sounds, no 

palpable masses, no hepatomegaly, no splenomegaly, no bruit


Extremities: no cyanosis, no clubbing, no edema


Skin: normal turgor, no lesions, no rashes


Neurological: cranial nerve grossly intact, normal sensation to touch, no 

weakness, no focal deficits, no new deficit


Musculoskeletal: normal tone, normal strength, no muscle wasting


Psychiatric - other findings: unable to evaluate





Hospitalist Results





- Labs


Result Diagrams: 


                                 12/15/20 04:25





                                 12/15/20 04:25


Lab results: 


                                        











WBC  10.7 thou/uL (4.8-10.8)   12/15/20  04:25    


 


Hgb  8.2 g/dL (12.0-16.0)  L  12/15/20  04:25    


 


Hct  27.7 % (36.0-47.0)  L  12/15/20  04:25    


 


MCV  82.7 fL (78.0-98.0)   12/15/20  04:25    


 


Plt Count  344 thou/uL (130-400)   12/15/20  04:25    


 


Neutrophils %  84.1 % (42.0-75.0)  H  12/15/20  04:25    


 


ABG pH  7.18  (7.35-7.45)  L*  12/15/20  04:11    


 


ABG pCO2  77.2 mmHg (35.0-45.0)  H*  12/15/20  04:11    


 


ABG pO2  263.9 mmHg (80.0-100.0)  H  12/15/20  04:11    


 


Sodium  142 mmol/L (136-145)   12/15/20  04:25    


 


Potassium  4.7 mmol/L (3.5-5.1)   12/15/20  04:25    


 


Chloride  101 mmol/L ()   12/15/20  04:25    


 


Carbon Dioxide  33 mmol/L (22-29)  H  12/15/20  04:25    


 


BUN  24 mg/dL (9.8-20.1)  H  12/15/20  04:25    


 


Creatinine  1.63 mg/dL (0.6-1.1)  H  12/15/20  04:25    


 


Glucose  172 mg/dL ()  H  12/15/20  04:25    


 


Lactic Acid  0.7 mmol/L (0.5-2.2)   12/15/20  04:34    


 


Calcium  9.3 mg/dL (7.8-10.44)   12/15/20  04:25    


 


Total Bilirubin  0.5 mg/dL (0.2-1.2)   12/15/20  04:25    


 


AST  12 U/L (5-34)   12/15/20  04:25    


 


ALT  16 U/L (8-55)   12/15/20  04:25    


 


Alkaline Phosphatase  108 U/L ()   12/15/20  04:25    


 


Troponin I  0.019 ng/mL (< 0.028)   12/15/20  04:25    


 


B-Natriuretic Peptide  269.1 pg/mL (0-100)  H  12/15/20  04:25    


 


Serum Total Protein  6.9 g/dL (6.0-8.3)   12/15/20  04:25    


 


Albumin  4.0 g/dL (3.5-5.0)   12/15/20  04:25    


 


Lipase  8 U/L (8-78)   12/15/20  04:25    











Additional comment: 





labs, imaging, microbioogy review by me





Hospitalist H&P A/P





- Plan


Plan: 





Patient is 55-year-old female with chronic diastolic heart failure, asthma, 

obstructive sleep apnea and hypertension brought to ED from rehab facility for 

shortness of breath. Patient was recently admitted to this hospital with a 

diagnosis of acute hypoxic and hypercapnic respiratory failure requiring 

mechanical ventilation. 





# acute hypercapnic respiratory failure - on BIPAP


# acute and chronic diastolic CHF


# anemia


# asthma?COPD


# obstructive sleep apnea


# hypertension


- admit to CCU


- consult pulmonary


- continue BIPAP


- IV steroids, abx, nebs (once COVID test negatieVE)


- follow up final imp





# GABBY - diurese and recheck in AM, may need malagon if retaining





42 minutes critical care time

## 2020-12-16 LAB
ANION GAP SERPL CALC-SCNC: 21 MMOL/L (ref 10–20)
BASOPHILS # BLD AUTO: 0 THOU/UL (ref 0–0.2)
BASOPHILS NFR BLD AUTO: 0.2 % (ref 0–1)
BUN SERPL-MCNC: 24 MG/DL (ref 9.8–20.1)
CALCIUM SERPL-MCNC: 9.1 MG/DL (ref 7.8–10.44)
CHLORIDE SERPL-SCNC: 102 MMOL/L (ref 98–107)
CO2 SERPL-SCNC: 22 MMOL/L (ref 22–29)
CREAT CL PREDICTED SERPL C-G-VRATE: 119 ML/MIN (ref 70–130)
EOSINOPHIL # BLD AUTO: 0 THOU/UL (ref 0–0.7)
EOSINOPHIL NFR BLD AUTO: 0.2 % (ref 0–10)
GLUCOSE SERPL-MCNC: 128 MG/DL (ref 70–105)
HGB BLD-MCNC: 8.5 G/DL (ref 12–16)
LYMPHOCYTES # BLD: 0.5 THOU/UL (ref 1.2–3.4)
LYMPHOCYTES NFR BLD AUTO: 6.2 % (ref 21–51)
MAGNESIUM SERPL-MCNC: 1.9 MG/DL (ref 1.6–2.6)
MCH RBC QN AUTO: 24 PG (ref 27–31)
MCV RBC AUTO: 83.4 FL (ref 78–98)
MONOCYTES # BLD AUTO: 0.1 THOU/UL (ref 0.11–0.59)
MONOCYTES NFR BLD AUTO: 1.5 % (ref 0–10)
NEUTROPHILS # BLD AUTO: 7.8 THOU/UL (ref 1.4–6.5)
NEUTROPHILS NFR BLD AUTO: 91.8 % (ref 42–75)
PLATELET # BLD AUTO: 211 THOU/UL (ref 130–400)
POTASSIUM SERPL-SCNC: 5.7 MMOL/L (ref 3.5–5.1)
RBC # BLD AUTO: 3.54 MILL/UL (ref 4.2–5.4)
SODIUM SERPL-SCNC: 139 MMOL/L (ref 136–145)
WBC # BLD AUTO: 8.5 THOU/UL (ref 4.8–10.8)

## 2020-12-16 RX ADMIN — ACETAZOLAMIDE SCH MG: 500 CAPSULE, EXTENDED RELEASE ORAL at 10:53

## 2020-12-16 RX ADMIN — CEFTRIAXONE SCH MLS: 1 INJECTION, POWDER, FOR SOLUTION INTRAMUSCULAR; INTRAVENOUS at 06:05

## 2020-12-16 RX ADMIN — Medication SCH ML: at 20:02

## 2020-12-16 RX ADMIN — AZITHROMYCIN SCH MLS: 500 INJECTION, POWDER, LYOPHILIZED, FOR SOLUTION INTRAVENOUS at 09:26

## 2020-12-16 RX ADMIN — Medication SCH ML: at 10:59

## 2020-12-16 NOTE — RAD
PORTABLE CHEST:

 

Date:  12/16/2020

 

HISTORY:  

[facility] follow-up. Ventilator. 

 

COMPARISON:  

12/15/2020. 

 

FINDINGS:

Bibasilar infiltrates again noted. Probable bilateral effusions associated with the bibasilar consoli
dation. 

 

IMPRESSION: 

Not significantly changed from yesterday. 

 

 

POS: AGW

## 2020-12-16 NOTE — PRG
DATE OF SERVICE:  12/16/2020



SUBJECTIVE:  Ms. Hart feels better this morning and is off the BiPAP.



OBJECTIVE:  VITAL SIGNS:  Temperature 97.6, pulse 99, O2 saturation 100%.  Her last

recorded blood pressure was 194/90. 

HEENT:  Unremarkable. 

NECK:  No JVD. 

LUNGS:  She has few crackles in the bases. 

CARDIAC:  S1 and S2, regular. 

ABDOMEN:  Obese, soft, nontender. 

EXTREMITIES:  No edema.



LABORATORY DATA:  Sodium 139, potassium 5.7, chloride 102, CO2 of 22, BUN 24,

creatinine 1.3, and glucose 128.  White blood cell count 8.5, hematocrit 29.5, and

platelet count 211.  If you look back at her blood pressures at the time of

admission, she was having systolic as high as 254 and diastolic as high as 116 and

ran consistently over 200 for the first 24 hours of her admission. 



ASSESSMENT:  This patient is suffering from diastolic heart dysfunction exacerbated

by uncontrolled hypertension.  One could make an argument that she had a

hypertensive emergency at the time of admission and probably needed to be on IV

nicardipine briefly.  This situation may be exacerbated by underlying sleep apnea,

but uncontrolled hypertension is the major issue at this point.  I do not think the

patient has pneumonia. 



RECOMMENDATIONS:  

1. I would stop her antibiotics.

2. I would get subspecialty consultation for recommendations regarding her

antihypertensives because what she is taking right now is clearly not enough. 

3. Needs a sleep study as an outpatient.

4. Would stop the IV steroids.





Job ID:  [id number]

## 2020-12-16 NOTE — PDOC.HOSPP
- Subjective


Encounter Date: 12/16/20


Encounter Time: 11:40


Subjective: 


f/u for resp failure/HTN urgency tx with BiPAP NIMV initially now on NC. Overall

feels much better. No CP or fever.





- Objective


Vital Signs & Weight: 


                             Vital Signs (12 hours)











  Temp Pulse Resp BP BP Pulse Ox


 


 12/16/20 11:21  98.8 F     


 


 12/16/20 10:57      220/99 H 


 


 12/16/20 10:56   100    


 


 12/16/20 10:54   100    


 


 12/16/20 09:26   100    


 


 12/16/20 09:01      194/90 H 


 


 12/16/20 08:13   100  21 H    100


 


 12/16/20 08:00       100


 


 12/16/20 07:52  97.6 F     


 


 12/16/20 06:10      185/86 H 


 


 12/16/20 04:35      178/78 H 


 


 12/16/20 04:31   96   216/97 H  


 


 12/16/20 03:56  98.2 F     


 


 12/16/20 03:50      228/107 H 


 


 12/16/20 02:55   96   216/97 H  


 


 12/16/20 02:45      216/97 H 


 


 12/16/20 02:27   96  16    96


 


 12/16/20 02:26   99  16    98


 


 12/16/20 01:30      209/95 H 


 


 12/16/20 00:37     241/109 H  


 


 12/16/20 00:35      241/109 H 


 


 12/15/20 23:56  97.2 F L     








                                     Weight











Weight                         352 lb











                            Most Recent Monitor Data











Heart Rate from ECG            101


 


NIBP                           277/128


 


NIBP BP-Mean                   177


 


Respiration from ECG           27


 


SpO2                           100














I&O: 


                                        











 12/15/20 12/16/20 12/17/20





 06:59 06:59 06:59


 


Intake Total  960 240


 


Output Total  900 


 


Balance  60 240











Result Diagrams: 


                                 12/16/20 03:29





                                 12/16/20 03:29


Additional Labs: 


                                   Accuchecks











  12/16/20 12/15/20 12/15/20





  05:35 23:32 18:30


 


POC Glucose  161 H  129 H  113 H








Microbiology





12/15/20 04:34   Venous blood - Right Arm   Blood Culture - Preliminary


                              Specimen has been received and culture in 

progress.


                              No Growth to date.


12/15/20 04:34   Venous blood - Right Arm   Blood Culture - Preliminary


                              Specimen has been received and culture in 

progress.


                              No Growth to date.





                                Laboratory Tests











  12/14/20 12/15/20 12/15/20





  06:30 04:25 04:25


 


Hgb    8.2 L


 


Creatinine   1.63 H 


 


Lactic Acid   


 


B-Natriuretic Peptide  235.5 H  


 


Influenza A RNA INAAT   


 


Influenza B RNA INAAT   


 


SARS-CoV-2 Rap RNA(RT-PCR)   














  12/15/20 12/15/20 12/15/20





  04:25 04:34 04:35


 


Hgb   


 


Creatinine   


 


Lactic Acid   0.7 


 


B-Natriuretic Peptide  269.1 H  


 


Influenza A RNA INAAT    Not Detected


 


Influenza B RNA INAAT    Not Detected


 


SARS-CoV-2 Rap RNA(RT-PCR)    Not Detected











Radiology Reviewed by me: Yes (PCXR - bilat pleural effusions)


EKG Reviewed by me: Yes (Tele - SR)





Hospitalist ROS





- Medication


Medications: 


Active Medications











Generic Name Dose Route Start Last Admin





  Trade Name Freq  PRN Reason Stop Dose Admin


 


Acetazolamide  500 mg  12/16/20 09:00  12/16/20 10:53





  Acetazolamide Er 500 Mg Cap  PO  12/18/20 09:01  500 mg





  DAILY SANDEE   Administration


 


Albuterol/Ipratropium  3 ml  12/15/20 07:00  12/16/20 08:13





  Ipratropium/Albuterol Sulfate 3 Ml Neb  NEB   3 ml





  L2TW-IU SANDEE   Administration


 


Amlodipine Besylate  10 mg  12/16/20 09:00  12/16/20 10:54





  Amlodipine 10 Mg Tab  PO   10 mg





  DAILY SANDEE   Administration


 


Amlodipine Besylate  10 mg  12/16/20 10:15  12/16/20 10:56





  Amlodipine 10 Mg Tab  PO  12/16/20 12:00  Not Given





  NOW SANDEE  


 


Enoxaparin Sodium  40 mg  12/15/20 21:00  12/15/20 21:14





  Enoxaparin Sodium 40 Mg/0.4 Ml Syringe  SC   40 mg





  2100 SANDEE   Administration


 


Famotidine  20 mg  12/15/20 09:00  12/16/20 09:26





  Famotidine 20 Mg Tab  PO   20 mg





  DAILY SANDEE   Administration


 


Hydralazine HCl  10 mg  12/15/20 21:39  12/16/20 09:26





  Hydralazine 20 Mg/Ml Vial  SLOW IVP   10 mg





  Q4H PRN   Administration





  SBP > 180  


 


Metoprolol Tartrate  12.5 mg  12/16/20 09:00  12/16/20 10:54





  Metoprolol Tartrate 25 Mg Tab  PO   12.5 mg





  BID SANDEE   Administration


 


Sodium Chloride  10 ml  12/15/20 09:00  12/16/20 10:59





  Flush - Normal Saline 10 Ml Syringe  IVF   10 ml





  Q12HR SANDEE   Administration














- Exam


General Appearance: NAD, awake alert


Eye: PERRL, anicteric sclera


ENT: normocephalic atraumatic, no oropharyngeal lesions


Neck: supple, symmetric, no JVD, no thyromegaly, no lymphadenopathy


Heart: RRR, no murmur, no gallops, no rubs, normal peripheral pulses


Heart - other findings: S1, S2


Respiratory: tachypneic


Respiratory - other findings: diminished in bases, few crackles


Gastrointestinal: soft, non-tender, non-distended, normal bowel sounds, no 

palpable masses


Gastrointestinal - other findings: obese


Extremities: no cyanosis, 1+ LE edema


Skin: normal turgor, no lesions


Neurological: cranial nerve grossly intact, no new deficit


Musculoskeletal: normal tone, generalized weakness


Psychiatric: normal affect, A&O x 3





Hosp A/P


(1) Acute respiratory failure with hypoxia


Code(s): J96.01 - ACUTE RESPIRATORY FAILURE WITH HYPOXIA   Status: Acute   


Plan: 


s/p BiPAP NIMV, continue O2 via NC, likely multifactorial given CHF/VANE/HTN 

urgency








(2) Hypertensive urgency


Code(s): I16.0 - HYPERTENSIVE URGENCY   Status: Acute   


Plan: 


Improved, titrate BP regimen for more optimal response, add Hydralazine 25mg 

TID, continue Norvasc/Metoprolol








(3) Acute on chronic diastolic (congestive) heart failure


Code(s): I50.33 - ACUTE ON CHRONIC DIASTOLIC (CONGESTIVE) HEART FAILURE   Statu

s: Acute   


Plan: 


Mild exacerbation, EF 50-55% by echo on 11/20








(4) GABBY (acute kidney injury)


Code(s): N17.9 - ACUTE KIDNEY FAILURE, UNSPECIFIED   Status: Acute   


Plan: 


mild, monitor renal function, avoid nephrotoxic meds and limit contrast








(5) Hyperkalemia


Code(s): E87.5 - HYPERKALEMIA   Status: Acute   


Plan: 


Lasix 40mg po x 1 now, then 20mg po BID








(6) Morbid obesity with BMI of 50.0-59.9, adult


Code(s): E66.01 - MORBID (SEVERE) OBESITY DUE TO EXCESS CALORIES; Z68.43 - BODY 

MASS INDEX [BMI] 50.0-59.9, ADULT   Status: Chronic   





(7) Obesity hypoventilation syndrome


Code(s): E66.2 - MORBID (SEVERE) OBESITY WITH ALVEOLAR HYPOVENTILATION   Status:

 Chronic   


Plan: 


Outpt sleep study, nocturnal CPAP








- Plan


plan discussed w/ family, , respiratory therapy, DVT proph w/SCDs





Stable overall


Resume Lasix 20mg BID, give 40mg po x 1 now


Add Hydralazine 25mg TID


OOB/ambulate


Resume home meds


AM lab: BMP, CBC

## 2020-12-16 NOTE — CON
DATE OF CONSULTATION:  



HISTORY OF PRESENT ILLNESS:  A 55-year-old morbidly obese female, comes to the ER

with shortness of breath, hypoxemia, saturations were in the 70s  __________4 L. 



X-ray showed bilateral pleural effusions and bilateral lower lung infiltrates. She

was given Lasix in the ER.  She was placed on noninvasive ventilation and she is

better.  She is now in the MICU. 



Awake, alert, and responsive.  Denies any pain or discomfort.  She is from

[place]. 



PAST MEDICAL HISTORY:  Congestive heart failure, respiratory failure, history of

COPD, history of sleep apnea, history of non-Hodgkin lymphoma, diabetes, obesity. 



PREVIOUS SURGERIES:  Lymph node biopsy, cholecystectomy, tubal ligation.



SOCIAL HISTORY:  No alcohol. No drug abuse.



ALLERGIES:  PENICILLIN.



HOME MEDICATIONS:  Include:

1. Norvasc 10.

2. Neb.

3. Lipitor 40.

4. Effexor 75.

5. Lasix 20. 



She is now started on antibiotic and steroids.



REVIEW OF SYSTEMS:  Otherwise, 10-point negative.



PHYSICAL EXAMINATION:

VITAL SIGNS:  Temperature 97, saturations 100% on BiPAP, respirations 17, she is now

saturating 95% on 4 L. 

CHEST:  Decreased breath sounds without any wheezing. 

CARDIAC: Normal S1 and S2.  No gallops. 

ABDOMEN:  No masses.



LABORATORY DATA:  Coronavirus test is negative.  Influenza is negative.  Creatinine

is 1.6, glucose 172.  White count 6000, H and H of 7 and 25, platelet count 256. 



ASSESSMENT:  Respiratory failure, congestive heart failure, morbid obesity, sleep

apnea, possible superimposed pneumonia, renal failure. 



She likely has diastolic dysfunction.  I agree with diuretics, supportive care, PT.

Nocturnal ventilation at nighttime.  Outpatient sleep study. 



We will notify Dr. Barajas who has seen the patient in the past.



TIME SPENT:  Consultation note, 70 minutes, 50% direct patient care.







Job ID:  [id number]

## 2020-12-17 LAB
ANION GAP SERPL CALC-SCNC: 15 MMOL/L (ref 10–20)
BASOPHILS # BLD AUTO: 0.1 THOU/UL (ref 0–0.2)
BASOPHILS NFR BLD AUTO: 1.3 % (ref 0–1)
BUN SERPL-MCNC: 26 MG/DL (ref 9.8–20.1)
CALCIUM SERPL-MCNC: 9.1 MG/DL (ref 7.8–10.44)
CHLORIDE SERPL-SCNC: 101 MMOL/L (ref 98–107)
CO2 SERPL-SCNC: 30 MMOL/L (ref 22–29)
CREAT CL PREDICTED SERPL C-G-VRATE: 104 ML/MIN (ref 70–130)
EOSINOPHIL # BLD AUTO: 0.1 THOU/UL (ref 0–0.7)
EOSINOPHIL NFR BLD AUTO: 1.9 % (ref 0–10)
GLUCOSE SERPL-MCNC: 128 MG/DL (ref 70–105)
HGB BLD-MCNC: 8.1 G/DL (ref 12–16)
LYMPHOCYTES # BLD: 1.3 THOU/UL (ref 1.2–3.4)
LYMPHOCYTES NFR BLD AUTO: 21.4 % (ref 21–51)
MCH RBC QN AUTO: 24.5 PG (ref 27–31)
MCV RBC AUTO: 82.1 FL (ref 78–98)
MONOCYTES # BLD AUTO: 0.5 THOU/UL (ref 0.11–0.59)
MONOCYTES NFR BLD AUTO: 8.3 % (ref 0–10)
NEUTROPHILS # BLD AUTO: 4.2 THOU/UL (ref 1.4–6.5)
NEUTROPHILS NFR BLD AUTO: 67 % (ref 42–75)
PLATELET # BLD AUTO: 215 THOU/UL (ref 130–400)
POTASSIUM SERPL-SCNC: 4.3 MMOL/L (ref 3.5–5.1)
RBC # BLD AUTO: 3.28 MILL/UL (ref 4.2–5.4)
SODIUM SERPL-SCNC: 142 MMOL/L (ref 136–145)
WBC # BLD AUTO: 6.2 THOU/UL (ref 4.8–10.8)

## 2020-12-17 RX ADMIN — ACETAZOLAMIDE SCH MG: 500 CAPSULE, EXTENDED RELEASE ORAL at 09:24

## 2020-12-17 RX ADMIN — Medication SCH ML: at 20:07

## 2020-12-17 RX ADMIN — Medication SCH ML: at 09:24

## 2020-12-17 NOTE — PRG
DATE OF SERVICE:  12/17/2020



SUBJECTIVE:  The patient feels much better.  Has no acute complaints.



OBJECTIVE:  VITAL SIGNS:  Temperature 96.9, respirations 20, O2 saturation 98%,

pulse 91, and blood pressure 150/70. 

HEENT:  Unremarkable. 

NECK:  No JVD. 

LUNGS:  Clear. 

CARDIAC:  S1 and S2, regular. 

ABDOMEN:  Soft and obese. 

EXTREMITIES:  Trace edema.



LABORATORY DATA:  Chest x-ray shows cardiomegaly with some pulmonary edema.  White

blood cell count 6.2, hematocrit 26.9, and platelet count 215.  Sodium 142,

potassium 4.3, chloride 101, CO2 of 30, BUN 26, creatinine 1.5, and glucose 128. 



ASSESSMENT:  

1. Diastolic heart failure.

2. Uncontrolled hypertension leading to flash pulmonary edema.



RECOMMENDATIONS:  

1. Can transfer to the medical floor.

2. Continue antihypertensives and diuretics.







Job ID:  [id number]

## 2020-12-17 NOTE — RAD
AP CHEST:

 

HISTORY: 

CCU followup.

 

COMPARISON: 

12/16/2020.

 

FINDINGS: 

Bibasilar opacification obscures the diaphragms consistent with bilateral effusions and bibasilar inf
iltrates and atelectasis.  Upper lung zones are clear.  

 

No significant interval change.

 

POS: AGW

## 2020-12-17 NOTE — PDOC.HOSPP
- Subjective


Encounter Date: 12/17/20


Encounter Time: 10:40


Subjective: 


f/u for resp failure likely multifactorial with HTN urgency/CHF exacerbation on 

previous BiPAP now on 3L O2 NC. Overall feeling better.





- Objective


Vital Signs & Weight: 


                             Vital Signs (12 hours)











  Temp Pulse Resp BP BP Pulse Ox


 


 12/17/20 09:23   96   152/70 H  


 


 12/17/20 09:22   93   152/70 H  


 


 12/17/20 07:44       98


 


 12/17/20 07:19  96.9 F L     


 


 12/17/20 06:44   85  16    100


 


 12/17/20 06:30      165/78 H 


 


 12/17/20 04:20      178/79 H 


 


 12/17/20 03:39  97.0 F L     


 


 12/17/20 03:00      171/89 H 


 


 12/17/20 02:20   97  16    97


 


 12/17/20 01:30   77   180/85 H  


 


 12/17/20 01:24     170/79 H  


 


 12/17/20 01:15      170/79 H 


 


 12/17/20 00:45   77  16    100


 


 12/17/20 00:34  97.2 F L     








                                     Weight











Weight                         348 lb 9.6 oz











                            Most Recent Monitor Data











Heart Rate from ECG            91


 


NIBP                           277/128


 


NIBP BP-Mean                   177


 


Respiration from ECG           20


 


SpO2                           98














I&O: 


                                        











 12/16/20 12/17/20 12/18/20





 06:59 06:59 06:59


 


Intake Total 960 2190 


 


Output Total 900 700 


 


Balance 60 1490 











Result Diagrams: 


                                 12/17/20 05:54





                                 12/17/20 05:54


Additional Labs: 


                                   Accuchecks











  12/17/20 12/16/20 12/16/20





  06:00 23:48 17:29


 


POC Glucose  130 H  134 H  150 H














  12/16/20





  12:22


 


POC Glucose  165 H








Microbiology





12/15/20 04:34   Venous blood - Right Arm   Blood Culture - Preliminary


                              Specimen has been received and culture in 

progress.


                              No Growth to date.


12/15/20 04:34   Venous blood - Right Arm   Blood Culture - Preliminary


                              Specimen has been received and culture in 

progress.


                              No Growth to date.





                                Laboratory Tests











  12/14/20 12/15/20 12/15/20





  06:30 04:25 04:25


 


Hgb    8.2 L


 


Creatinine   1.63 H 


 


Lactic Acid   


 


B-Natriuretic Peptide  235.5 H  


 


Influenza A RNA INAAT   


 


Influenza B RNA INAAT   


 


SARS-CoV-2 Rap RNA(RT-PCR)   














  12/15/20 12/15/20 12/15/20





  04:25 04:34 04:35


 


Hgb   


 


Creatinine   


 


Lactic Acid   0.7 


 


B-Natriuretic Peptide  269.1 H  


 


Influenza A RNA INAAT    Not Detected


 


Influenza B RNA INAAT    Not Detected


 


SARS-CoV-2 Rap RNA(RT-PCR)    Not Detected











Radiology Reviewed by me: Yes (PCXR - bibasilar effusions)


EKG Reviewed by me: Yes (Tele - SR)





Hospitalist ROS





- Medication


Medications: 


Active Medications











Generic Name Dose Route Start Last Admin





  Trade Name Freq  PRN Reason Stop Dose Admin


 


Acetaminophen  650 mg  12/16/20 20:12  12/16/20 21:59





  Acetaminophen 325 Mg Tab  PO   650 mg





  Q4H PRN   Administration





  Headache/Fever/Mild Pain (1-3)  


 


Acetazolamide  500 mg  12/16/20 09:00  12/17/20 09:24





  Acetazolamide Er 500 Mg Cap  PO  12/18/20 09:01  500 mg





  DAILY SANDEE   Administration


 


Albuterol/Ipratropium  3 ml  12/15/20 07:00  12/17/20 06:44





  Ipratropium/Albuterol Sulfate 3 Ml Neb  NEB   3 ml





  [id number] SANDEE   Administration


 


Amlodipine Besylate  10 mg  12/16/20 09:00  12/17/20 09:22





  Amlodipine 10 Mg Tab  PO   10 mg





  DAILY SANDEE   Administration


 


Clonidine  0.1 mg  12/15/20 23:31  12/17/20 01:24





  Clonidine 0.1 Mg Tab  PO   0.1 mg





  BIDPRN PRN   Administration





  SBP > 160, use second  


 


Enoxaparin Sodium  40 mg  12/15/20 21:00  12/16/20 20:01





  Enoxaparin Sodium 40 Mg/0.4 Ml Syringe  SC   40 mg





  2100 SANDEE   Administration


 


Famotidine  20 mg  12/15/20 09:00  12/17/20 09:23





  Famotidine 20 Mg Tab  PO   20 mg





  DAILY SANDEE   Administration


 


Hydralazine HCl  10 mg  12/15/20 21:39  12/17/20 01:30





  Hydralazine 20 Mg/Ml Vial  SLOW IVP   10 mg





  Q4H PRN   Administration





  SBP > 180  


 


Hydralazine HCl  25 mg  12/16/20 15:00  12/17/20 09:23





  Hydralazine 25 Mg Tab  PO   25 mg





  TID SANDEE   Administration


 


Metoprolol Tartrate  12.5 mg  12/16/20 09:00  12/17/20 09:23





  Metoprolol Tartrate 25 Mg Tab  PO   12.5 mg





  BID SANDEE   Administration


 


Sodium Chloride  10 ml  12/15/20 09:00  12/17/20 09:24





  Flush - Normal Saline 10 Ml Syringe  IVF   10 ml





  Q12HR SANDEE   Administration














- Exam


General Appearance: NAD, awake alert


Eye: PERRL, anicteric sclera


ENT: normocephalic atraumatic, no oropharyngeal lesions


Neck: supple, symmetric, no JVD, no thyromegaly, no lymphadenopathy, no carotid 

bruit


Heart: RRR, no gallops, no rubs, normal peripheral pulses


Heart - other findings: S1, S2


Respiratory - other findings: diminished in bases, + crackles


Gastrointestinal: soft, non-tender, non-distended, normal bowel sounds, no 

palpable masses


Gastrointestinal - other findings: obese


Extremities: no cyanosis, no clubbing, 1+ LE edema


Skin: normal turgor, no lesions


Neurological: cranial nerve grossly intact, no new deficit


Musculoskeletal: normal tone, generalized weakness


Psychiatric: normal affect, A&O x 3





Hosp A/P


(1) Acute respiratory failure with hypoxia


Code(s): J96.01 - ACUTE RESPIRATORY FAILURE WITH HYPOXIA   Status: Acute   


Plan: 


Multifactorial including CHF/HTN urgency/VANE, continue current O2 

supplementation 3L/min NC








(2) Hypertensive urgency


Code(s): I16.0 - HYPERTENSIVE URGENCY   Status: Acute   


Plan: 


Resolved, continue current BP regimen, titrate to optimal response








(3) Acute on chronic diastolic (congestive) heart failure


Code(s): I50.33 - ACUTE ON CHRONIC DIASTOLIC (CONGESTIVE) HEART FAILURE   

Status: Acute   





(4) GABBY (acute kidney injury)


Code(s): N17.9 - ACUTE KIDNEY FAILURE, UNSPECIFIED   Status: Acute   


Plan: 


Stable, monitor renal function and avoid nephrotoxic meds








(5) Hyperkalemia


Code(s): E87.5 - HYPERKALEMIA   Status: Acute   


Plan: 


Resolved








(6) Morbid obesity with BMI of 50.0-59.9, adult


Code(s): E66.01 - MORBID (SEVERE) OBESITY DUE TO EXCESS CALORIES; Z68.43 - BODY 

MASS INDEX [BMI] 50.0-59.9, ADULT   Status: Chronic   





(7) Obesity hypoventilation syndrome


Code(s): E66.2 - MORBID (SEVERE) OBESITY WITH ALVEOLAR HYPOVENTILATION   Status:

 Chronic   


Plan: 


Outpatient sleep study for home CPAP








- Plan


PT/OT, , respiratory therapy, out of bed/ambulate





Stable overall


Resume Lasix 20mg BID


Lasix 40mg IV x 1 now


Add Hydralazine 25mg TID


OOB/ambulate


Outpatient sleep study for home CPAP


Resume home meds


AM lab: BMP


Transfer to Medical Floor

## 2020-12-18 LAB
ANION GAP SERPL CALC-SCNC: 16 MMOL/L (ref 10–20)
BUN SERPL-MCNC: 20 MG/DL (ref 9.8–20.1)
CALCIUM SERPL-MCNC: 9.2 MG/DL (ref 7.8–10.44)
CHLORIDE SERPL-SCNC: 100 MMOL/L (ref 98–107)
CO2 SERPL-SCNC: 29 MMOL/L (ref 22–29)
CREAT CL PREDICTED SERPL C-G-VRATE: 104 ML/MIN (ref 70–130)
GLUCOSE SERPL-MCNC: 125 MG/DL (ref 70–105)
POTASSIUM SERPL-SCNC: 4.2 MMOL/L (ref 3.5–5.1)
SODIUM SERPL-SCNC: 141 MMOL/L (ref 136–145)

## 2020-12-18 RX ADMIN — ACETAZOLAMIDE SCH MG: 500 CAPSULE, EXTENDED RELEASE ORAL at 09:47

## 2020-12-18 RX ADMIN — Medication SCH ML: at 21:35

## 2020-12-18 RX ADMIN — Medication SCH ML: at 09:07

## 2020-12-18 NOTE — PDOC.FMACP
Advance Care Planning





- Note


Participants: patient, palliative care


Summary: 


Palliative Care  revisited Advanced Care Planning.  The diagnosis, prognosis and

goals of care were discussed.  Appropriate forms and documentation to accomplish

the goals of care were discussed.  All questions were answered.  





*Ms Hart has complete directives in the past.


*OOHDNAR was noted to be done inaccurately last hospital stay, document was 

renewed and physician signature was obtained.


*Confirmed MPOA and Directives


*Transition to Cardiac measures only if needed while in the hospital, however 

desires to remain with an OOHDNAR in place. 





Palliative care will sign off as directives were addressed.





If we can assist in the future with revisiting Goal of Care, complex decision 

making, coping or support please reconsult our team.  





Thank you for this very appropriate consult. 


Time Spent (mins): 20

## 2020-12-18 NOTE — PDOC.HOSPP
- Subjective


Encounter Date: 12/18/20


Encounter Time: 12:20


Subjective: 


f/u for HTN urgency/CHF initially managed on BiPAP NIMV now on RA. Overall 

feeling better but BP labile.





- Objective


Vital Signs & Weight: 


                             Vital Signs (12 hours)











  Temp Pulse Resp BP Pulse Ox


 


 12/18/20 11:05  98.4 F  88  18  194/112 H  98


 


 12/18/20 09:04   88   


 


 12/18/20 09:02   88   


 


 12/18/20 07:35  97.9 F  88  14  185/103 H  98


 


 12/18/20 07:24   85  18   100


 


 12/18/20 06:25      100


 


 12/18/20 03:46  98.4 F  85  20  154/91 H  100








                                     Weight











Weight                         351 lb 3.2 oz











                            Most Recent Monitor Data











Heart Rate from ECG            90


 


NIBP                           159/73


 


NIBP BP-Mean                   101


 


Respiration from ECG           24


 


SpO2                           99














I&O: 


                                        











 12/17/20 12/18/20 12/19/20





 06:59 06:59 06:59


 


Intake Total 2190 1380 


 


Output Total 700 3950 


 


Balance 1490 -2570 











Result Diagrams: 


                                 12/17/20 05:54





                                 12/18/20 05:18


Additional Labs: 


                                   Accuchecks











  12/18/20 12/18/20 12/18/20





  11:11 05:23 00:24


 


POC Glucose  163 H  122 H  135 H














  12/17/20





  17:22


 


POC Glucose  132 H








Microbiology





12/15/20 04:34   Venous blood - Right Arm   Blood Culture - Preliminary


                              Specimen has been received and culture in 

progress.


                              No Growth to date.


12/15/20 04:34   Venous blood - Right Arm   Blood Culture - Preliminary


                              Specimen has been received and culture in 

progress.


                              No Growth to date.





                                Laboratory Tests











  12/14/20 12/15/20 12/15/20





  06:30 04:25 04:25


 


Hgb    8.2 L


 


Creatinine   1.63 H 


 


Lactic Acid   


 


B-Natriuretic Peptide  235.5 H  


 


Influenza A RNA INAAT   


 


Influenza B RNA INAAT   


 


SARS-CoV-2 Rap RNA(RT-PCR)   














  12/15/20 12/15/20 12/15/20





  04:25 04:34 04:35


 


Hgb   


 


Creatinine   


 


Lactic Acid   0.7 


 


B-Natriuretic Peptide  269.1 H  


 


Influenza A RNA INAAT    Not Detected


 


Influenza B RNA INAAT    Not Detected


 


SARS-CoV-2 Rap RNA(RT-PCR)    Not Detected














Hospitalist ROS





- Medication


Medications: 


Active Medications











Generic Name Dose Route Start Last Admin





  Trade Name Freq  PRN Reason Stop Dose Admin


 


Acetaminophen  650 mg  12/16/20 20:12  12/16/20 21:59





  Acetaminophen 325 Mg Tab  PO   650 mg





  Q4H PRN   Administration





  Headache/Fever/Mild Pain (1-3)  


 


Albuterol/Ipratropium  3 ml  12/15/20 07:00  12/18/20 07:24





  Ipratropium/Albuterol Sulfate 3 Ml Neb  NEB   3 ml





  [id number] SANDEE   Administration


 


Amlodipine Besylate  10 mg  12/16/20 09:00  12/18/20 09:04





  Amlodipine 10 Mg Tab  PO   10 mg





  DAILY SANDEE   Administration


 


Clonidine  0.1 mg  12/15/20 23:31  12/17/20 01:24





  Clonidine 0.1 Mg Tab  PO   0.1 mg





  BIDPRN PRN   Administration





  SBP > 160, use second  


 


Enoxaparin Sodium  40 mg  12/15/20 21:00  12/17/20 20:07





  Enoxaparin Sodium 40 Mg/0.4 Ml Syringe  SC   40 mg





  2100 SANDEE   Administration


 


Famotidine  20 mg  12/15/20 09:00  12/18/20 09:02





  Famotidine 20 Mg Tab  PO   20 mg





  DAILY SANDEE   Administration


 


Hydralazine HCl  10 mg  12/15/20 21:39  12/17/20 01:30





  Hydralazine 20 Mg/Ml Vial  SLOW IVP   10 mg





  Q4H PRN   Administration





  SBP > 180  


 


Metoprolol Tartrate  12.5 mg  12/16/20 09:00  12/18/20 09:02





  Metoprolol Tartrate 25 Mg Tab  PO   12.5 mg





  BID SANDEE   Administration


 


Sodium Chloride  10 ml  12/15/20 09:00  12/18/20 09:07





  Flush - Normal Saline 10 Ml Syringe  IVF   10 ml





  Q12HR SANDEE   Administration














- Exam


General Appearance: NAD, awake alert


Eye: PERRL, anicteric sclera


ENT: normocephalic atraumatic, no oropharyngeal lesions


Neck: supple, symmetric, no JVD, no thyromegaly, no lymphadenopathy


Heart: RRR, no murmur, no gallops, no rubs, normal peripheral pulses


Heart - other findings: S1, S2


Respiratory: CTAB, no wheezes, no rales, no ronchi


Respiratory - other findings: diminished in bases


Gastrointestinal: soft, non-tender, non-distended, normal bowel sounds, no 

palpable masses


Gastrointestinal - other findings: obese


Extremities: no cyanosis, no clubbing, 1+ LE edema


Skin: normal turgor, no lesions


Neurological: cranial nerve grossly intact, no new deficit


Musculoskeletal: normal tone, normal strength, no muscle wasting


Psychiatric: normal affect, A&O x 3





Hosp A/P


(1) Acute respiratory failure with hypoxia


Code(s): J96.01 - ACUTE RESPIRATORY FAILURE WITH HYPOXIA   Status: Acute   


Plan: 


Improved, continue O2 supplementation 2L/min








(2) Hypertensive urgency


Code(s): I16.0 - HYPERTENSIVE URGENCY   Status: Acute   


Plan: 


Resolved, overall BP labile, increase Hydralazine 50mg TID, increase Metoprolol 

25mg BID, continue Norvasc 10mg daily








(3) Acute on chronic diastolic (congestive) heart failure


Code(s): I50.33 - ACUTE ON CHRONIC DIASTOLIC (CONGESTIVE) HEART FAILURE   

Status: Acute   





(4) GABBY (acute kidney injury)


Code(s): N17.9 - ACUTE KIDNEY FAILURE, UNSPECIFIED   Status: Acute   





(5) Hyperkalemia


Code(s): E87.5 - HYPERKALEMIA   Status: Acute   





(6) Morbid obesity with BMI of 50.0-59.9, adult


Code(s): E66.01 - MORBID (SEVERE) OBESITY DUE TO EXCESS CALORIES; Z68.43 - BODY 

MASS INDEX [BMI] 50.0-59.9, ADULT   Status: Chronic   





(7) Obesity hypoventilation syndrome


Code(s): E66.2 - MORBID (SEVERE) OBESITY WITH ALVEOLAR HYPOVENTILATION   Status:

 Chronic   


Plan: 


Outpt sleep study after d/c








- Plan


, out of bed/ambulate, DVT proph w/SCDs





Stable overall


Resume Lasix 20mg po BID


Increase Hydralazine 50mg TID


Increase Metoprolol 25mg BID


Continue Norvasc 10mg daily


OOB/ambulate


Outpatient sleep study for home CPAP


Resume home meds


Likely home in 24h if BP trend stabilizing

## 2020-12-19 VITALS — DIASTOLIC BLOOD PRESSURE: 87 MMHG | SYSTOLIC BLOOD PRESSURE: 166 MMHG

## 2020-12-19 VITALS — TEMPERATURE: 98.4 F

## 2020-12-19 RX ADMIN — Medication SCH ML: at 08:38

## 2020-12-19 NOTE — PDOC.DS.DS
Provider





- Provider


Date of Admission: 


12/15/20 07:01





Date of Discharge: 12/19/20


Admitting Provider: 


Natan Membreno MD





Consultations: Pulmonary


Primary Care Physician: 


WESLEY Ca








Course





- Hospital Course


Hospital Course: 





HISTORY OF PRESENT ILLNESS AND BRIEF HOSPITAL COURSE:


Patient is a pleasant 55 years old -American female who has significant 

past medical histories of chronic diastolic heart failure, asthma, obstructive 

sleep apnea, uncontrolled hypertension, who was sent from rehab facility for 

worsening dyspnea.  Patient was recently admitted to the hospital for acute 

hypoxic respiratory failure and required mechanical ventilation.  See was doing 

well and subsequently discharged to rehab.  While there, she became 

decompensated, and more short of breath.  In the ED, further work-up, found that

patient was in respiratory distress, and wheezing diffusely.  She was 

subsequently admitted for acute on chronic diastolic heart failure.  She was 

given IV Lasix and admitted to CCU for further management.  Her last echo EF 50-

55%.  She has been diuresed well, and her oxygen had weaned down to her home O2 

which is 3 L continuous.  Patient stated her breathing has back to baseline, and

is request to be discharged home.  Should be noted that, her blood pressure was 

uncontrolled, we have adjusted her home medication.  Patient was advised to keep

a log of her blood pressure and take it with her to follow-up with her PCP.  She

also had appointment for sleep study early next week.  At this time, patient is 

stable to discharge home, and follow-up with her PCP





PROCEDURE PERFORMED: NONE





DISCHARGE CONDITION: STABLE 





DISPOSITION: HOME 





PHYSICAL EXAM:





General Appearance: Alert, oriented, resting comfortably, no apparent distress, 

well developed/nourished.


HEENT: Normocephalic/atraumatic, moist mucous membrane, normal ENT inspection, 

normal tones.  PERRLA, no scleral icterus, normal conjunctiva


Neck: Supple, normal inspection, no JVD


Respiratory: Lungs are clear bilaterally, normal breath sounds, no accessory 

muscle use


Cardiovascular: Regular rate, regular rhythm, no murmur, no rubs


Abdomen: Soft, nontender, nondistended, normal bowel sounds, no organomegaly, no

guarding no rebound


Back: Normal inspection, no CVA tenderness


Extremities: No clubbing, no cyanosis, no edema


Psych/Mental Status: Normal affect, speech, non-pressured, AAO x 3


Neurologic: CN II-XII are intact. 


Skin: Warm/Dry, Normal Color, no rashes








DISCHARGE TIME SPENT: >30 MINUTES 





Resuscitation Status: 








12/18/20 11:50


Resuscitation Status Routine 


   Resuscitation Status: PRTL: Cardiac only


   Discussed with: Patient











- Labs


Lab Results: 


                                        





                                 12/17/20 05:54 





                                 12/18/20 05:18 





Abnormal Lab Results - Last 48 hrs





12/18/20 05:18: Creatinine 1.56 H





Microbiology - Entire Visit





12/15/20 04:34   Venous blood - Right Arm   Blood Culture - Preliminary


                            NO GROWTH AT 48 HOURS


12/15/20 04:34   Venous blood - Right Arm   Blood Culture - Preliminary


                            NO GROWTH AT 48 HOURS











- Physical Exam


Vitals: 


                             Vital Signs (12 hours)











  Temp Pulse Resp BP BP Pulse Ox


 


 12/19/20 08:35   100   169/94 H   91 L


 


 12/19/20 08:34   100   169/94 H  


 


 12/19/20 07:38  98.4 F  100  18   169/94 H  91 L


 


 12/19/20 06:53   87  18    100


 


 12/19/20 04:19       100


 


 12/19/20 04:02  97.7 F  96  20   157/83 H  100


 


 12/18/20 23:45       99


 


 12/18/20 23:08  98.7 F  96  19   127/70  97


 


 12/18/20 22:59      133/76 








                                     Weight











Weight                         345 lb 6.4 oz











                            Most Recent Monitor Data











Heart Rate from ECG            90


 


NIBP                           159/73


 


NIBP BP-Mean                   101


 


Respiration from ECG           24


 


SpO2                           99














Physical Exam: 


The patient was seen and examined on the day of discharge.








Problem





- Problem


(1) Acute on chronic diastolic (congestive) heart failure


Code(s): I50.33 - ACUTE ON CHRONIC DIASTOLIC (CONGESTIVE) HEART FAILURE   

Status: Acute   





(2) Acute respiratory failure with hypoxia


Code(s): J96.01 - ACUTE RESPIRATORY FAILURE WITH HYPOXIA   Status: Acute   





(3) Hyperkalemia


Code(s): E87.5 - HYPERKALEMIA   Status: Acute   





(4) Hypernatremia


Code(s): E87.0 - HYPEROSMOLALITY AND HYPERNATREMIA   Status: Acute   





(5) Hypomagnesemia


Code(s): E83.42 - HYPOMAGNESEMIA   Status: Acute   





(6) Asthma


Code(s): J45.909 - UNSPECIFIED ASTHMA, UNCOMPLICATED   Status: Chronic   


Qualifiers: 


   Asthma severity: mild   Asthma persistence: intermittent   Asthma complicat

ion type: uncomplicated   Qualified Code(s): J45.20 - Mild intermittent asthma, 

uncomplicated   





(7) CKD (chronic kidney disease), stage III


Code(s): N18.3 - CHRONIC KIDNEY DISEASE, STAGE 3 (MODERATE) * DO NOT USE *   

Status: Chronic   


Qualifiers: 


   Chronic kidney disease stage 3 subtype: stage 3a (GFR 45-59)   Qualified 

Code(s): N18.31 - Chronic kidney disease, stage 3a   





(8) DM type 2 (diabetes mellitus, type 2)


Status: Chronic   


Qualifiers: 


   Diabetes mellitus long term insulin use: with long term use   Diabetes 

mellitus complication status: with kidney complications   Diabetes mellitus 

complication detail: with chronic kidney disease   Chronic kidney disease stage:

stage 3 (moderate) 





(9) HTN (hypertension)


Code(s): I10 - ESSENTIAL (PRIMARY) HYPERTENSION   Status: Chronic   


Qualifiers: 


   Hypertension type: essential hypertension   Qualified Code(s): I10 - 

Essential (primary) hypertension   





(10) Morbid obesity with BMI of 50.0-59.9, adult


Code(s): E66.01 - MORBID (SEVERE) OBESITY DUE TO EXCESS CALORIES; Z68.43 - BODY 

MASS INDEX [BMI] 50.0-59.9, ADULT   Status: Chronic   





(11) Obesity hypoventilation syndrome


Code(s): E66.2 - MORBID (SEVERE) OBESITY WITH ALVEOLAR HYPOVENTILATION   Status:

 Chronic   





Plan





- Discharge Medications


Prescriptions: 


cloNIDine [Catapres] 0.1 mg PO BID PRN #30 tab


 PRN Reason: hypertension


hydrALAZINE HCl [Hydralazine HCl] 50 mg PO TID #90 tablet


Metoprolol Succinate [Toprol XL] 50 mg PO DAILY #30 tab


Home Medications: 


                                        











 Medication  Instructions  Recorded  Confirmed  Type


 


Atorvastatin Calcium [Lipitor] 40 mg PO DAILY 11/29/19 12/15/20 History


 


Venlafaxine HCl [Effexor] 75 mg PO DAILY 12/17/19 12/15/20 History


 


Ipratropium/Albuterol Sulfate 3 ml NEB QID PRN #30 neb 12/19/19 12/15/20 Rx





[DuoNeb]    


 


Amlodipine [Norvasc] 10 mg PO DAILY 12/15/20 12/15/20 History


 


Furosemide 20 mg PO BID PRN 12/15/20 12/15/20 History


 


Metoprolol Succinate [Toprol XL] 50 mg PO DAILY #30 tab 12/19/20  Rx


 


cloNIDine [Catapres] 0.1 mg PO BID PRN #30 tab 12/19/20  Rx


 


hydrALAZINE HCl [Hydralazine HCl] 50 mg PO TID #90 tablet 12/19/20  Rx











Allergies: 








Penicillins Allergy (Verified 12/15/20 16:05)


   Hives








- Discharge Instructions


Discharge Instructions:: 


Follow up with your PCP next week


Keep your -150/50-80 range


keep a log of your BP and take it to your PCP to adjust your medications if 

needed


Obtain sleep study








Activity:: Activity as Tolerated


Nourishment:: Heart Healthy Diet, Low Sodium Diet





- Follow up Plan


Referrals: 


Ruiz Benavides MD [Primary Care Provider] - 


Disposition: HOME HEALTH





Quality





- Care Measures


CORE MEASURES:: N/A

## 2020-12-19 NOTE — EKG
Test Reason : 

Blood Pressure : ***/*** mmHG

Vent. Rate : 086 BPM     Atrial Rate : 086 BPM

   P-R Int : 120 ms          QRS Dur : 082 ms

    QT Int : 366 ms       P-R-T Axes : 053 022 054 degrees

   QTc Int : 437 ms

 

Normal sinus rhythm

Normal ECG

 

Confirmed by GOLDBERG M.D., ADRIANA ([id number]),  VAIBHAV SILVA ([id number]) on 12/19/2020 1:29:32 PM

 

Referred By:             Confirmed By:ADRIANA GOLDBERG M.D.

## 2024-04-01 NOTE — PRG
DATE OF SERVICE:  12/03/2020



SUBJECTIVE:  Patient was seen and examined at bedside and overnight events noted.

Patient denies any shortness of breath or chest pain or palpitation.  No history of

nausea or vomiting or diarrhea or fever or chills or cramps. 



OBJECTIVE:  General:  This is well-built female, in no apparent distress. 

Vital Signs:  Temperature 98.4.  Heart Rate 102.  Respiratory rate 22.  Blood

pressure 220/106. 

HEENT:  Atraumatic, normocephalic. Oral mucosa is moist. 

Neck:  Supple. 

Cardiovascular:  S1, S2 heard.  Rate and rhythm regular. 

Respiratory:  Clear to auscultation. 

Gastrointestinal:  Abdomen is soft. 

Musculoskeletal:  No tenderness.  No edema. 

Dermatologic:  No skin rash. 

Neurologic:  Alert and awake and oriented x3.  No focal neurologic deficits. Moving

all the extremities. 

Psychiatric:  Mood and affect normal.



LABORATORY DATA:  Sodium 146, potassium 3.8, BUN is 41, and creatinine is 1.2.



ASSESSMENT AND PLAN:  

1. Acute kidney injury on chronic kidney disease, stage 3, stable.

2. Hypernatremia, better.

3. Alkalosis.

4. Edema.

5. Cardiorenal syndrome.

6. Acute hypoxic respiratory failure, extubated. 

The patient was extubated, doing well.  Agree with starting on Procardia.  Monitor

blood pressure. 





Job ID:  [id number]